# Patient Record
Sex: FEMALE | Race: BLACK OR AFRICAN AMERICAN | NOT HISPANIC OR LATINO | Employment: UNEMPLOYED | URBAN - METROPOLITAN AREA
[De-identification: names, ages, dates, MRNs, and addresses within clinical notes are randomized per-mention and may not be internally consistent; named-entity substitution may affect disease eponyms.]

---

## 2019-07-16 LAB
LEFT EYE DM RETINOPATHY: NEGATIVE
RIGHT EYE DM RETINOPATHY: NEGATIVE

## 2019-07-22 ENCOUNTER — HISTORICAL (OUTPATIENT)
Dept: INTERNAL MEDICINE | Facility: CLINIC | Age: 49
End: 2019-07-22

## 2019-07-22 LAB
ABS NEUT (OLG): 1.67 X10(3)/MCL (ref 2.1–9.2)
ALBUMIN SERPL-MCNC: 3.8 GM/DL (ref 3.4–5)
ALBUMIN/GLOB SERPL: 0.9 RATIO (ref 1.1–2)
ALP SERPL-CCNC: 74 UNIT/L (ref 45–117)
ALT SERPL-CCNC: 51 UNIT/L (ref 12–78)
AST SERPL-CCNC: 32 UNIT/L (ref 15–37)
BASOPHILS # BLD AUTO: 0.03 X10(3)/MCL
BASOPHILS NFR BLD AUTO: 1 %
BILIRUB SERPL-MCNC: 0.3 MG/DL (ref 0.2–1)
BILIRUBIN DIRECT+TOT PNL SERPL-MCNC: 0.1 MG/DL
BILIRUBIN DIRECT+TOT PNL SERPL-MCNC: 0.2 MG/DL
BUN SERPL-MCNC: 11 MG/DL (ref 7–18)
CALCIUM SERPL-MCNC: 9.4 MG/DL (ref 8.5–10.1)
CHLORIDE SERPL-SCNC: 105 MMOL/L (ref 98–107)
CHOLEST SERPL-MCNC: 244 MG/DL
CHOLEST/HDLC SERPL: 4.6 {RATIO} (ref 0–4.4)
CO2 SERPL-SCNC: 29 MMOL/L (ref 21–32)
CREAT SERPL-MCNC: 1 MG/DL (ref 0.6–1.3)
CREAT UR-MCNC: 92 MG/DL
EOSINOPHIL # BLD AUTO: 0.15 X10(3)/MCL
EOSINOPHIL NFR BLD AUTO: 4 %
ERYTHROCYTE [DISTWIDTH] IN BLOOD BY AUTOMATED COUNT: 13.3 % (ref 11.5–14.5)
EST. AVERAGE GLUCOSE BLD GHB EST-MCNC: 186 MG/DL
GLOBULIN SER-MCNC: 4.1 GM/ML (ref 2.3–3.5)
GLUCOSE SERPL-MCNC: 142 MG/DL (ref 74–106)
HBA1C MFR BLD: 8.1 % (ref 4.2–6.3)
HCT VFR BLD AUTO: 39 % (ref 35–46)
HDLC SERPL-MCNC: 53 MG/DL
HGB BLD-MCNC: 12.4 GM/DL (ref 12–16)
LDLC SERPL CALC-MCNC: 161 MG/DL (ref 0–130)
LYMPHOCYTES # BLD AUTO: 1.45 X10(3)/MCL
LYMPHOCYTES NFR BLD AUTO: 41 % (ref 13–40)
MCH RBC QN AUTO: 27.7 PG (ref 26–34)
MCHC RBC AUTO-ENTMCNC: 31.8 GM/DL (ref 31–37)
MCV RBC AUTO: 87.1 FL (ref 80–100)
MICROALBUMIN UR-MCNC: 101 MG/L (ref 0–19)
MICROALBUMIN/CREAT RATIO PNL UR: 109.8 MCG/MG CR (ref 0–29)
MONOCYTES # BLD AUTO: 0.27 X10(3)/MCL
MONOCYTES NFR BLD AUTO: 8 % (ref 0–24)
NEUTROPHILS # BLD AUTO: 1.67 X10(3)/MCL
NEUTROPHILS NFR BLD AUTO: 47 X10(3)/MCL
PLATELET # BLD AUTO: 216 X10(3)/MCL (ref 130–400)
PMV BLD AUTO: 10.1 FL (ref 7.4–10.4)
POTASSIUM SERPL-SCNC: 4.2 MMOL/L (ref 3.5–5.1)
PROT SERPL-MCNC: 7.9 GM/DL (ref 6.4–8.2)
RBC # BLD AUTO: 4.48 X10(6)/MCL (ref 4–5.2)
SODIUM SERPL-SCNC: 138 MMOL/L (ref 136–145)
T3FREE SERPL-MCNC: 1.95 PG/ML (ref 2.18–3.98)
T4 FREE SERPL-MCNC: 0.96 NG/DL (ref 0.76–1.46)
TRIGL SERPL-MCNC: 150 MG/DL
TSH SERPL-ACNC: 6.74 MIU/L (ref 0.36–3.74)
VLDLC SERPL CALC-MCNC: 30 MG/DL
WBC # SPEC AUTO: 3.6 X10(3)/MCL (ref 4.5–11)

## 2019-07-29 ENCOUNTER — HISTORICAL (OUTPATIENT)
Dept: RADIOLOGY | Facility: HOSPITAL | Age: 49
End: 2019-07-29

## 2019-08-06 ENCOUNTER — HISTORICAL (OUTPATIENT)
Dept: INTERNAL MEDICINE | Facility: CLINIC | Age: 49
End: 2019-08-06

## 2019-08-10 ENCOUNTER — HOSPITAL ENCOUNTER (OUTPATIENT)
Dept: MEDSURG UNIT | Facility: HOSPITAL | Age: 49
End: 2019-08-12
Attending: INTERNAL MEDICINE | Admitting: INTERNAL MEDICINE

## 2019-08-10 ENCOUNTER — HOSPITAL ENCOUNTER (OUTPATIENT)
Dept: TELEMEDICINE | Facility: HOSPITAL | Age: 49
Discharge: HOME | End: 2019-08-10

## 2019-08-10 DIAGNOSIS — R29.90 STROKE-LIKE SYMPTOMS: ICD-10-CM

## 2019-08-10 LAB
ABS NEUT (OLG): 2.11 X10(3)/MCL (ref 2.1–9.2)
ALBUMIN SERPL-MCNC: 3.8 GM/DL (ref 3.4–5)
ALBUMIN/GLOB SERPL: 1 RATIO (ref 1.1–2)
ALP SERPL-CCNC: 76 UNIT/L (ref 45–117)
ALT SERPL-CCNC: 33 UNIT/L (ref 12–78)
AMPHET UR QL SCN: NEGATIVE
APPEARANCE, UA: CLEAR
APTT PPP: 29 SECOND(S) (ref 23.3–37)
AST SERPL-CCNC: 25 UNIT/L (ref 15–37)
BACTERIA #/AREA URNS AUTO: ABNORMAL /[HPF]
BARBITURATE SCN PRESENT UR: NEGATIVE
BASOPHILS # BLD AUTO: 0.03 X10(3)/MCL
BASOPHILS NFR BLD AUTO: 1 %
BENZODIAZ UR QL SCN: NEGATIVE
BILIRUB SERPL-MCNC: 0.3 MG/DL (ref 0.2–1)
BILIRUB UR QL STRIP: NEGATIVE
BILIRUBIN DIRECT+TOT PNL SERPL-MCNC: 0.1 MG/DL
BILIRUBIN DIRECT+TOT PNL SERPL-MCNC: 0.2 MG/DL
BUN SERPL-MCNC: 13 MG/DL (ref 7–18)
CALCIUM SERPL-MCNC: 9.2 MG/DL (ref 8.5–10.1)
CANNABINOIDS UR QL SCN: NEGATIVE
CHLORIDE SERPL-SCNC: 103 MMOL/L (ref 98–107)
CK MB SERPL-MCNC: <1 NG/ML (ref 1–3.6)
CK SERPL-CCNC: 118 UNIT/L (ref 26–192)
CO2 SERPL-SCNC: 28 MMOL/L (ref 21–32)
COCAINE UR QL SCN: NEGATIVE
COLOR UR: COLORLESS
CREAT SERPL-MCNC: 1.1 MG/DL (ref 0.6–1.3)
EOSINOPHIL # BLD AUTO: 0.21 X10(3)/MCL
EOSINOPHIL NFR BLD AUTO: 5 %
ERYTHROCYTE [DISTWIDTH] IN BLOOD BY AUTOMATED COUNT: 13.5 % (ref 11.5–14.5)
ETHANOL SERPL-MCNC: <3 MG/DL
GLOBULIN SER-MCNC: 4 GM/ML (ref 2.3–3.5)
GLUCOSE (UA): 30 MG/DL
GLUCOSE SERPL-MCNC: 231 MG/DL (ref 74–106)
HCT VFR BLD AUTO: 38.6 % (ref 35–46)
HGB BLD-MCNC: 12.2 GM/DL (ref 12–16)
HGB UR QL STRIP: NEGATIVE
HYALINE CASTS #/AREA URNS LPF: ABNORMAL /[LPF]
IMM GRANULOCYTES # BLD AUTO: 0.01 10*3/UL
IMM GRANULOCYTES NFR BLD AUTO: 0 %
INR PPP: 0.95 (ref 0.9–1.2)
KETONES UR QL STRIP: NEGATIVE
LEUKOCYTE ESTERASE UR QL STRIP: NEGATIVE
LYMPHOCYTES # BLD AUTO: 1.68 X10(3)/MCL
LYMPHOCYTES NFR BLD AUTO: 38 % (ref 13–40)
MAGNESIUM SERPL-MCNC: 2.1 MG/DL (ref 1.6–2.6)
MCH RBC QN AUTO: 27.1 PG (ref 26–34)
MCHC RBC AUTO-ENTMCNC: 31.6 GM/DL (ref 31–37)
MCV RBC AUTO: 85.8 FL (ref 80–100)
MONOCYTES # BLD AUTO: 0.37 X10(3)/MCL
MONOCYTES NFR BLD AUTO: 8 % (ref 0–24)
NEUTROPHILS # BLD AUTO: 2.11 X10(3)/MCL
NEUTROPHILS NFR BLD AUTO: 48 X10(3)/MCL
NITRITE UR QL STRIP: NEGATIVE
OPIATES UR QL SCN: NEGATIVE
PCP UR QL: NEGATIVE
PH UR STRIP.AUTO: 5 [PH] (ref 5–8)
PH UR STRIP: 5 [PH] (ref 4.5–8)
PLATELET # BLD AUTO: 212 X10(3)/MCL (ref 130–400)
PMV BLD AUTO: 10.8 FL (ref 7.4–10.4)
POC TROPONIN: 0 NG/ML (ref 0–0.08)
POTASSIUM SERPL-SCNC: 4.1 MMOL/L (ref 3.5–5.1)
PROT SERPL-MCNC: 7.8 GM/DL (ref 6.4–8.2)
PROT UR QL STRIP: NEGATIVE
PROTHROMBIN TIME: 12.6 SECOND(S) (ref 11.9–14.4)
RBC # BLD AUTO: 4.5 X10(6)/MCL (ref 4–5.2)
RBC #/AREA URNS AUTO: ABNORMAL /[HPF]
SODIUM SERPL-SCNC: 138 MMOL/L (ref 136–145)
SP GR UR STRIP: 1.01 (ref 1–1.03)
SQUAMOUS #/AREA URNS LPF: ABNORMAL /[LPF]
TEMPERATURE, URINE (OHS): 25 DEGC (ref 20–25)
TROPONIN I SERPL-MCNC: <0.015 NG/ML (ref 0–0.05)
UROBILINOGEN UR STRIP-ACNC: NORMAL
WBC # SPEC AUTO: 4.4 X10(3)/MCL (ref 4.5–11)
WBC #/AREA URNS AUTO: ABNORMAL /HPF

## 2019-08-10 PROCEDURE — G0427 INPT/ED TELECONSULT70: HCPCS | Mod: GT,,, | Performed by: PSYCHIATRY & NEUROLOGY

## 2019-08-10 PROCEDURE — G0427 PR INPT TELEHEALTH CON 70/>M: ICD-10-PCS | Mod: GT,,, | Performed by: PSYCHIATRY & NEUROLOGY

## 2019-08-11 LAB
ABS NEUT (OLG): 1.4 X10(3)/MCL (ref 2.1–9.2)
ALBUMIN SERPL-MCNC: 3.2 GM/DL (ref 3.4–5)
ALBUMIN/GLOB SERPL: 0.9 RATIO (ref 1.1–2)
ALP SERPL-CCNC: 65 UNIT/L (ref 45–117)
ALT SERPL-CCNC: 27 UNIT/L (ref 12–78)
AST SERPL-CCNC: 20 UNIT/L (ref 15–37)
BASOPHILS # BLD AUTO: 0.02 X10(3)/MCL
BASOPHILS NFR BLD AUTO: 1 %
BILIRUB SERPL-MCNC: 0.3 MG/DL (ref 0.2–1)
BILIRUBIN DIRECT+TOT PNL SERPL-MCNC: <0.1 MG/DL
BILIRUBIN DIRECT+TOT PNL SERPL-MCNC: ABNORMAL MG/DL
BUN SERPL-MCNC: 11 MG/DL (ref 7–18)
CALCIUM SERPL-MCNC: 8.9 MG/DL (ref 8.5–10.1)
CHLORIDE SERPL-SCNC: 109 MMOL/L (ref 98–107)
CK SERPL-CCNC: 86 UNIT/L (ref 26–192)
CO2 SERPL-SCNC: 25 MMOL/L (ref 21–32)
CREAT SERPL-MCNC: 0.9 MG/DL (ref 0.6–1.3)
EOSINOPHIL # BLD AUTO: 0.16 10*3/UL
EOSINOPHIL NFR BLD AUTO: 5 %
ERYTHROCYTE [DISTWIDTH] IN BLOOD BY AUTOMATED COUNT: 13.5 % (ref 11.5–14.5)
GLOBULIN SER-MCNC: 3.5 GM/ML (ref 2.3–3.5)
GLUCOSE SERPL-MCNC: 185 MG/DL (ref 74–106)
HCT VFR BLD AUTO: 36.1 % (ref 35–46)
HGB BLD-MCNC: 11.3 GM/DL (ref 12–16)
IMM GRANULOCYTES # BLD AUTO: 0.01 10*3/UL
IMM GRANULOCYTES NFR BLD AUTO: 0 %
LYMPHOCYTES # BLD AUTO: 1.4 X10(3)/MCL
LYMPHOCYTES NFR BLD AUTO: 44 % (ref 13–40)
MAGNESIUM SERPL-MCNC: 2.2 MG/DL (ref 1.6–2.6)
MCH RBC QN AUTO: 26.8 PG (ref 26–34)
MCHC RBC AUTO-ENTMCNC: 31.3 GM/DL (ref 31–37)
MCV RBC AUTO: 85.7 FL (ref 80–100)
MONOCYTES # BLD AUTO: 0.24 X10(3)/MCL
MONOCYTES NFR BLD AUTO: 8 % (ref 0–24)
NEUTROPHILS # BLD AUTO: 1.4 X10(3)/MCL
NEUTROPHILS NFR BLD AUTO: 43 X10(3)/MCL
PHOSPHATE SERPL-MCNC: 3.5 MG/DL (ref 2.5–4.9)
PLATELET # BLD AUTO: 181 X10(3)/MCL (ref 130–400)
PMV BLD AUTO: 11 FL (ref 7.4–10.4)
POTASSIUM SERPL-SCNC: 4.1 MMOL/L (ref 3.5–5.1)
PROT SERPL-MCNC: 6.7 GM/DL (ref 6.4–8.2)
RBC # BLD AUTO: 4.21 X10(6)/MCL (ref 4–5.2)
SODIUM SERPL-SCNC: 141 MMOL/L (ref 136–145)
WBC # SPEC AUTO: 3.2 X10(3)/MCL (ref 4.5–11)

## 2019-08-11 NOTE — CONSULTS
[facility]  Vascular Neurology  Comprehensive Stroke Center  Tele-Consultation Note      Consults    Consulting Provider: NA CONN  Current Providers  No providers found    Patient Location: [facility] - [facility] Emergency Department  Spoke hospital nurse at bedside with patient assisting consultant.     Patient information was obtained from patient.         Assessment/Plan:     STROKE DOCUMENTATION     Acute Stroke Times:   Acute Stroke Times   Last Known Normal Date: 08/10/19  Last Known Normal Time: 1800  Stroke Team Arrival Date: 08/10/19  Stroke Team Arrival Time: 2141  CT Interpretation Time: 2142    NIH Scale:  Interval: baseline  1a. Level of Consciousness: 0-->Alert, keenly responsive  1b. LOC Questions: 0-->Answers both questions correctly  1c. LOC Commands: 0-->Performs both tasks correctly  2. Best Gaze: 0-->Normal  3. Visual: 0-->No visual loss  4. Facial Palsy: 0-->Normal symmetrical movements  5a. Motor Arm, Left: 0-->No drift, limb holds 90 (or 45) degrees for full 10 secs  5b. Motor Arm, Right: 0-->No drift, limb holds 90 (or 45) degrees for full 10 secs  6a. Motor Leg, Left: 0-->No drift, leg holds 30 degree position for full 5 secs  6b. Motor Leg, Right: 0-->No drift, leg holds 30 degree position for full 5 secs  7. Limb Ataxia: 0-->Absent  8. Sensory: 1-->Mild-to-moderate sensory loss, patient feels pinprick is less sharp or is dull on the affected side, or there is a loss of superficial pain with pinprick, but patient is aware of being touched  9. Best Language: 0-->No aphasia, normal  10. Dysarthria: 0-->Normal  11. Extinction and Inattention (formerly Neglect): 0-->No abnormality  Total (NIH Stroke Scale): 1     Modified Pratt Score: 0  [place] Coma Scale:15   ABCD2 Score:    [id number] Score:   HAS -BLED Score:   ICH Score:   Hunt & Little Classification:       Diagnoses:   Stroke-like  symptoms  Cardiovascular risk factor.  Stroke like symptoms resolved        There were no vitals taken for this visit.  Alteplase Eligible?: No  Alteplase Recommendation: Alteplase not recommended due to Suspected stroke mimic   Possible Interventional Revascularization Candidate? No; No significant neurological deficit    Disposition Recommendation: admit to inpatient    Subjective:     History of Present Illness:  Acute onset of confusion, staring, word finding difficulties. Improved but now complaining of a headache.   No history of weakness, numbness.     PMHx  HTN  DM  Hyperlipidemia  History of absence seizure. Last seizure 2012. Onset was adulthood. Off keppra since 2012      Woke up with symptoms?: no    Recent bleeding noted: no  Does the patient take any Blood Thinners? no  Medications: No relevant medications      Past Medical History: hypertension and diabetes    Past Surgical History: none    Family History: no relevant history    Social History: no smoking, no drinking, no drugs    Allergies: Allergies have not been reviewed No known drug allergies  Review of Symptoms:   Constitutional: Denies fevers, weight loss, chills, or weakness.   Eyes: Denies changes in vision.   ENT: Denies dysphagia, nasal discharge, ear pain or discharge.   Cardiovascular: Denies chest pain, palpitations, orthopnea, or claudication.   Respiratory: Denies shortness of breath, cough, hemoptysis, or wheezing.   GI: Denies nausea/vomitting, hematochezia, melena, abd pain, or changes in appetite.   : Denies dysuria, incontinence, or hematuria.   Musculoskeletal: Denies joint pain or myalgias.   Skin/breast: Denies rashes, lumps, lesions, or discharge.   Neurologic: Denies headache, dizziness, vertigo, or paresthesias.   Psychiatric: Denies changes in mood or hallucinations.   Endocrine: Denies polyuria, polydipsia, heat/cold intolerance.   Hematologic/Lymph: Denies lymphadenopathy, easy bruising or easy bleeding.    Allergic/Immunologic: Denies rash, rhinitis    Review of Systems  Objective:   Vitals: There were no vitals taken for this visit. BP: 160/99, Respiratory Rate: 16 and Heart Rate: 77    CT READ: Yes  No hemmorhage. No mass effect. No early infarct signs.     Physical Exam   Constitutional: She is oriented to person, place, and time. She appears well-developed and well-nourished.   HENT:   Head: Normocephalic and atraumatic.   Eyes: EOM are normal.   Pulmonary/Chest: Effort normal.   Neurological: She is alert and oriented to person, place, and time.   Vitals reviewed.            Recommended the emergency room physician to have a brief discussion with the patient and/or family if available regarding the risks and benefits of treatment, and to briefly document the occurrence of that discussion in his clinical encounter note.     The attending portion of this evaluation, treatment, and documentation was performed per Meet Larkin MD via audiovisual.    Billing code:  ([place] [facility] INPT CONSULT 35MIN)    In your opinion, this was a: Tier 2 Van Negative    Consult End Time: 9:49 PM     Meet Larkin MD  Comprehensive Stroke Center  Vascular Neurology   [facility]

## 2019-08-11 NOTE — HPI
Acute onset of confusion, staring, word finding difficulties. Improved but now complaining of a headache.   No history of weakness, numbness.     PMHx  HTN  DM  Hyperlipidemia  History of absence seizure. Last seizure 2012. Onset was adulthood. Off keppra since 2012

## 2019-08-11 NOTE — SUBJECTIVE & OBJECTIVE
Woke up with symptoms?: no    Recent bleeding noted: no  Does the patient take any Blood Thinners? no  Medications: No relevant medications      Past Medical History: hypertension and diabetes    Past Surgical History: none    Family History: no relevant history    Social History: no smoking, no drinking, no drugs    Allergies: Allergies have not been reviewed No known drug allergies  Review of Symptoms:   Constitutional: Denies fevers, weight loss, chills, or weakness.   Eyes: Denies changes in vision.   ENT: Denies dysphagia, nasal discharge, ear pain or discharge.   Cardiovascular: Denies chest pain, palpitations, orthopnea, or claudication.   Respiratory: Denies shortness of breath, cough, hemoptysis, or wheezing.   GI: Denies nausea/vomitting, hematochezia, melena, abd pain, or changes in appetite.   : Denies dysuria, incontinence, or hematuria.   Musculoskeletal: Denies joint pain or myalgias.   Skin/breast: Denies rashes, lumps, lesions, or discharge.   Neurologic: Denies headache, dizziness, vertigo, or paresthesias.   Psychiatric: Denies changes in mood or hallucinations.   Endocrine: Denies polyuria, polydipsia, heat/cold intolerance.   Hematologic/Lymph: Denies lymphadenopathy, easy bruising or easy bleeding.   Allergic/Immunologic: Denies rash, rhinitis    Review of Systems  Objective:   Vitals: There were no vitals taken for this visit. BP: 160/99, Respiratory Rate: 16 and Heart Rate: 77    CT READ: Yes  No hemmorhage. No mass effect. No early infarct signs.     Physical Exam   Constitutional: She is oriented to person, place, and time. She appears well-developed and well-nourished.   HENT:   Head: Normocephalic and atraumatic.   Eyes: EOM are normal.   Pulmonary/Chest: Effort normal.   Neurological: She is alert and oriented to person, place, and time.   Vitals reviewed.

## 2019-08-12 LAB
ABS NEUT (OLG): 1.64 X10(3)/MCL (ref 2.1–9.2)
ALBUMIN SERPL-MCNC: 3.2 GM/DL (ref 3.4–5)
ALBUMIN/GLOB SERPL: 0.9 RATIO (ref 1.1–2)
ALP SERPL-CCNC: 67 UNIT/L (ref 45–117)
ALT SERPL-CCNC: 32 UNIT/L (ref 12–78)
AST SERPL-CCNC: 28 UNIT/L (ref 15–37)
BASOPHILS # BLD AUTO: 0.03 X10(3)/MCL
BASOPHILS NFR BLD AUTO: 1 %
BILIRUB SERPL-MCNC: 0.4 MG/DL (ref 0.2–1)
BILIRUBIN DIRECT+TOT PNL SERPL-MCNC: <0.1 MG/DL
BILIRUBIN DIRECT+TOT PNL SERPL-MCNC: ABNORMAL MG/DL
BUN SERPL-MCNC: 10 MG/DL (ref 7–18)
CALCIUM SERPL-MCNC: 9 MG/DL (ref 8.5–10.1)
CHLORIDE SERPL-SCNC: 108 MMOL/L (ref 98–107)
CO2 SERPL-SCNC: 29 MMOL/L (ref 21–32)
CREAT SERPL-MCNC: 1 MG/DL (ref 0.6–1.3)
EOSINOPHIL # BLD AUTO: 0.15 X10(3)/MCL
EOSINOPHIL NFR BLD AUTO: 4 %
ERYTHROCYTE [DISTWIDTH] IN BLOOD BY AUTOMATED COUNT: 13.6 % (ref 11.5–14.5)
GLOBULIN SER-MCNC: 3.6 GM/ML (ref 2.3–3.5)
GLUCOSE SERPL-MCNC: 127 MG/DL (ref 74–106)
HCT VFR BLD AUTO: 36.9 % (ref 35–46)
HGB BLD-MCNC: 11.7 GM/DL (ref 12–16)
IMM GRANULOCYTES # BLD AUTO: 0.01 10*3/UL
IMM GRANULOCYTES NFR BLD AUTO: 0 %
LYMPHOCYTES # BLD AUTO: 1.34 X10(3)/MCL
LYMPHOCYTES NFR BLD AUTO: 39 % (ref 13–40)
MAGNESIUM SERPL-MCNC: 2.1 MG/DL (ref 1.6–2.6)
MCH RBC QN AUTO: 27.2 PG (ref 26–34)
MCHC RBC AUTO-ENTMCNC: 31.7 GM/DL (ref 31–37)
MCV RBC AUTO: 85.8 FL (ref 80–100)
MONOCYTES # BLD AUTO: 0.26 X10(3)/MCL
MONOCYTES NFR BLD AUTO: 8 % (ref 0–24)
NEUTROPHILS # BLD AUTO: 1.64 X10(3)/MCL
NEUTROPHILS NFR BLD AUTO: 48 X10(3)/MCL
PHOSPHATE SERPL-MCNC: 3.7 MG/DL (ref 2.5–4.9)
PLATELET # BLD AUTO: 182 X10(3)/MCL (ref 130–400)
PMV BLD AUTO: 10.9 FL (ref 7.4–10.4)
POTASSIUM SERPL-SCNC: 3.7 MMOL/L (ref 3.5–5.1)
PROT SERPL-MCNC: 6.8 GM/DL (ref 6.4–8.2)
RBC # BLD AUTO: 4.3 X10(6)/MCL (ref 4–5.2)
SODIUM SERPL-SCNC: 144 MMOL/L (ref 136–145)
T3FREE SERPL-MCNC: 1.8 PG/ML (ref 2.18–3.98)
T4 FREE SERPL-MCNC: 1.11 NG/DL (ref 0.76–1.46)
TSH SERPL-ACNC: 2.73 MIU/L (ref 0.36–3.74)
WBC # SPEC AUTO: 3.4 X10(3)/MCL (ref 4.5–11)

## 2020-03-03 ENCOUNTER — HISTORICAL (OUTPATIENT)
Dept: INTERNAL MEDICINE | Facility: CLINIC | Age: 50
End: 2020-03-03

## 2020-03-03 LAB
ABS NEUT (OLG): 1.33 X10(3)/MCL (ref 2.1–9.2)
ALBUMIN SERPL-MCNC: 4 GM/DL (ref 3.4–5)
ALBUMIN/GLOB SERPL: 1 RATIO (ref 1.1–2)
ALP SERPL-CCNC: 73 UNIT/L (ref 45–117)
ALT SERPL-CCNC: 40 UNIT/L (ref 12–78)
AST SERPL-CCNC: 28 UNIT/L (ref 15–37)
BASOPHILS # BLD AUTO: 0 X10(3)/MCL (ref 0–0.2)
BASOPHILS NFR BLD AUTO: 1 %
BILIRUB SERPL-MCNC: 0.3 MG/DL (ref 0.2–1)
BILIRUBIN DIRECT+TOT PNL SERPL-MCNC: <0.1 MG/DL (ref 0–0.2)
BILIRUBIN DIRECT+TOT PNL SERPL-MCNC: >0.2 MG/DL
BUN SERPL-MCNC: 20 MG/DL (ref 7–18)
CALCIUM SERPL-MCNC: 9.8 MG/DL (ref 8.5–10.1)
CHLORIDE SERPL-SCNC: 106 MMOL/L (ref 98–107)
CHOLEST SERPL-MCNC: 243 MG/DL
CHOLEST/HDLC SERPL: 4.3 {RATIO} (ref 0–4.4)
CO2 SERPL-SCNC: 27 MMOL/L (ref 21–32)
CREAT SERPL-MCNC: 1.1 MG/DL (ref 0.6–1.3)
EOSINOPHIL # BLD AUTO: 0.1 X10(3)/MCL (ref 0–0.9)
EOSINOPHIL NFR BLD AUTO: 4 %
ERYTHROCYTE [DISTWIDTH] IN BLOOD BY AUTOMATED COUNT: 13.4 % (ref 11.5–14.5)
EST. AVERAGE GLUCOSE BLD GHB EST-MCNC: 214 MG/DL
GLOBULIN SER-MCNC: 4 GM/ML (ref 2.3–3.5)
GLUCOSE SERPL-MCNC: 94 MG/DL (ref 74–106)
HBA1C MFR BLD: 9.1 % (ref 4.2–6.3)
HCT VFR BLD AUTO: 41.7 % (ref 35–46)
HDLC SERPL-MCNC: 56 MG/DL (ref 40–59)
HGB BLD-MCNC: 13.6 GM/DL (ref 12–16)
IMM GRANULOCYTES # BLD AUTO: 0.01 10*3/UL
IMM GRANULOCYTES NFR BLD AUTO: 0 %
LDLC SERPL CALC-MCNC: 159 MG/DL
LYMPHOCYTES # BLD AUTO: 1.5 X10(3)/MCL (ref 0.6–4.6)
LYMPHOCYTES NFR BLD AUTO: 46 %
MCH RBC QN AUTO: 28.4 PG (ref 26–34)
MCHC RBC AUTO-ENTMCNC: 32.6 GM/DL (ref 31–37)
MCV RBC AUTO: 87.1 FL (ref 80–100)
MONOCYTES # BLD AUTO: 0.2 X10(3)/MCL (ref 0.1–1.3)
MONOCYTES NFR BLD AUTO: 7 %
NEUTROPHILS # BLD AUTO: 1.33 X10(3)/MCL (ref 2.1–9.2)
NEUTROPHILS NFR BLD AUTO: 42 %
PLATELET # BLD AUTO: 213 X10(3)/MCL (ref 130–400)
PMV BLD AUTO: 10.3 FL (ref 7.4–10.4)
POTASSIUM SERPL-SCNC: 4.3 MMOL/L (ref 3.5–5.1)
PROT SERPL-MCNC: 8 GM/DL (ref 6.4–8.2)
RBC # BLD AUTO: 4.79 X10(6)/MCL (ref 4–5.2)
SODIUM SERPL-SCNC: 136 MMOL/L (ref 136–145)
T3FREE SERPL-MCNC: 1.96 PG/ML (ref 2.18–3.98)
T4 FREE SERPL-MCNC: 1.01 NG/DL (ref 0.76–1.46)
TRIGL SERPL-MCNC: 139 MG/DL
TSH SERPL-ACNC: 5.87 MIU/L (ref 0.36–3.74)
VLDLC SERPL CALC-MCNC: 28 MG/DL
WBC # SPEC AUTO: 3.2 X10(3)/MCL (ref 4.5–11)

## 2020-08-14 ENCOUNTER — HISTORICAL (OUTPATIENT)
Dept: INTERNAL MEDICINE | Facility: CLINIC | Age: 50
End: 2020-08-14

## 2020-08-14 LAB
ABS NEUT (OLG): 2.31 X10(3)/MCL (ref 2.1–9.2)
ALBUMIN SERPL-MCNC: 3.4 GM/DL (ref 3.4–5)
ALBUMIN/GLOB SERPL: 0.9 RATIO (ref 1.1–2)
ALP SERPL-CCNC: 62 UNIT/L (ref 45–117)
ALT SERPL-CCNC: 26 UNIT/L (ref 12–78)
AST SERPL-CCNC: 21 UNIT/L (ref 15–37)
BASOPHILS # BLD AUTO: 0 X10(3)/MCL (ref 0–0.2)
BASOPHILS NFR BLD AUTO: 0 %
BILIRUB SERPL-MCNC: 0.2 MG/DL (ref 0.2–1)
BILIRUBIN DIRECT+TOT PNL SERPL-MCNC: <0.1 MG/DL (ref 0–0.2)
BILIRUBIN DIRECT+TOT PNL SERPL-MCNC: >0.1 MG/DL
BUN SERPL-MCNC: 18 MG/DL (ref 7–18)
CALCIUM SERPL-MCNC: 9.1 MG/DL (ref 8.5–10.1)
CHLORIDE SERPL-SCNC: 107 MMOL/L (ref 98–107)
CHOLEST SERPL-MCNC: 229 MG/DL
CHOLEST/HDLC SERPL: 4.4 {RATIO} (ref 0–4.4)
CO2 SERPL-SCNC: 26 MMOL/L (ref 21–32)
CREAT SERPL-MCNC: 1.1 MG/DL (ref 0.6–1.3)
EOSINOPHIL # BLD AUTO: 0.2 X10(3)/MCL (ref 0–0.9)
EOSINOPHIL NFR BLD AUTO: 5 %
ERYTHROCYTE [DISTWIDTH] IN BLOOD BY AUTOMATED COUNT: 13.7 % (ref 11.5–14.5)
EST. AVERAGE GLUCOSE BLD GHB EST-MCNC: 186 MG/DL
GLOBULIN SER-MCNC: 3.8 GM/ML (ref 2.3–3.5)
GLUCOSE SERPL-MCNC: 71 MG/DL (ref 74–106)
HBA1C MFR BLD: 8.1 % (ref 4.2–6.3)
HCT VFR BLD AUTO: 38.7 % (ref 35–46)
HDLC SERPL-MCNC: 52 MG/DL (ref 40–59)
HGB BLD-MCNC: 12.4 GM/DL (ref 12–16)
IMM GRANULOCYTES # BLD AUTO: 0.01 10*3/UL
IMM GRANULOCYTES NFR BLD AUTO: 0 %
LDLC SERPL CALC-MCNC: 153 MG/DL
LYMPHOCYTES # BLD AUTO: 1.5 X10(3)/MCL (ref 0.6–4.6)
LYMPHOCYTES NFR BLD AUTO: 34 %
MCH RBC QN AUTO: 28.6 PG (ref 26–34)
MCHC RBC AUTO-ENTMCNC: 32 GM/DL (ref 31–37)
MCV RBC AUTO: 89.2 FL (ref 80–100)
MONOCYTES # BLD AUTO: 0.3 X10(3)/MCL (ref 0.1–1.3)
MONOCYTES NFR BLD AUTO: 8 %
NEUTROPHILS # BLD AUTO: 2.31 X10(3)/MCL (ref 2.1–9.2)
NEUTROPHILS NFR BLD AUTO: 52 %
PLATELET # BLD AUTO: 191 X10(3)/MCL (ref 130–400)
PMV BLD AUTO: 10.5 FL (ref 7.4–10.4)
POTASSIUM SERPL-SCNC: 3.9 MMOL/L (ref 3.5–5.1)
PROT SERPL-MCNC: 7.2 GM/DL (ref 6.4–8.2)
RBC # BLD AUTO: 4.34 X10(6)/MCL (ref 4–5.2)
SODIUM SERPL-SCNC: 139 MMOL/L (ref 136–145)
T4 FREE SERPL-MCNC: 0.88 NG/DL (ref 0.76–1.46)
TRIGL SERPL-MCNC: 121 MG/DL
TSH SERPL-ACNC: 12.99 MIU/L (ref 0.36–3.74)
VLDLC SERPL CALC-MCNC: 24 MG/DL
WBC # SPEC AUTO: 4.4 X10(3)/MCL (ref 4.5–11)

## 2021-02-18 ENCOUNTER — HISTORICAL (OUTPATIENT)
Dept: ADMINISTRATIVE | Facility: HOSPITAL | Age: 51
End: 2021-02-18

## 2021-02-18 LAB
ABS NEUT (OLG): 2.1 X10(3)/MCL (ref 2.1–9.2)
ALBUMIN SERPL-MCNC: 3.6 GM/DL (ref 3.5–5)
ALBUMIN/GLOB SERPL: 1 RATIO (ref 1.1–2)
ALP SERPL-CCNC: 76 UNIT/L (ref 40–150)
ALT SERPL-CCNC: 32 UNIT/L (ref 0–55)
AST SERPL-CCNC: 25 UNIT/L (ref 5–34)
BASOPHILS # BLD AUTO: 0 X10(3)/MCL (ref 0–0.2)
BASOPHILS NFR BLD AUTO: 0 %
BILIRUB SERPL-MCNC: 0.3 MG/DL
BILIRUBIN DIRECT+TOT PNL SERPL-MCNC: 0.1 MG/DL (ref 0–0.5)
BILIRUBIN DIRECT+TOT PNL SERPL-MCNC: 0.2 MG/DL (ref 0–0.8)
BUN SERPL-MCNC: 11 MG/DL (ref 9.8–20.1)
CALCIUM SERPL-MCNC: 9.6 MG/DL (ref 8.4–10.2)
CHLORIDE SERPL-SCNC: 102 MMOL/L (ref 98–107)
CHOLEST SERPL-MCNC: 222 MG/DL
CHOLEST/HDLC SERPL: 5 {RATIO} (ref 0–5)
CO2 SERPL-SCNC: 26 MMOL/L (ref 22–29)
CREAT SERPL-MCNC: 0.97 MG/DL (ref 0.55–1.02)
EOSINOPHIL # BLD AUTO: 0.2 X10(3)/MCL (ref 0–0.9)
EOSINOPHIL NFR BLD AUTO: 6 %
ERYTHROCYTE [DISTWIDTH] IN BLOOD BY AUTOMATED COUNT: 12.6 % (ref 11.5–14.5)
EST. AVERAGE GLUCOSE BLD GHB EST-MCNC: 191.5 MG/DL
GLOBULIN SER-MCNC: 3.7 GM/DL (ref 2.4–3.5)
GLUCOSE SERPL-MCNC: 236 MG/DL (ref 74–100)
HBA1C MFR BLD: 8.3 %
HCT VFR BLD AUTO: 38.9 % (ref 35–46)
HDLC SERPL-MCNC: 47 MG/DL (ref 35–60)
HGB BLD-MCNC: 12.9 GM/DL (ref 12–16)
HIGH RISK HPV 16 (PRECISION): NEGATIVE
HIGH RISK HPV 18/45 (PRECISION): NEGATIVE
IMM GRANULOCYTES # BLD AUTO: 0.01 10*3/UL
IMM GRANULOCYTES NFR BLD AUTO: 0 %
LDLC SERPL CALC-MCNC: 143 MG/DL (ref 50–140)
LYMPHOCYTES # BLD AUTO: 1.2 X10(3)/MCL (ref 0.6–4.6)
LYMPHOCYTES NFR BLD AUTO: 31 %
MCH RBC QN AUTO: 30.2 PG (ref 26–34)
MCHC RBC AUTO-ENTMCNC: 33.2 GM/DL (ref 31–37)
MCV RBC AUTO: 91.1 FL (ref 80–100)
MONOCYTES # BLD AUTO: 0.3 X10(3)/MCL (ref 0.1–1.3)
MONOCYTES NFR BLD AUTO: 8 %
NEUTROPHILS # BLD AUTO: 2.1 X10(3)/MCL (ref 2.1–9.2)
NEUTROPHILS NFR BLD AUTO: 55 %
PAP RECOMMENDATION EXT: NORMAL
PAP SMEAR: NORMAL
PLATELET # BLD AUTO: 199 X10(3)/MCL (ref 130–400)
PMV BLD AUTO: 10.8 FL (ref 7.4–10.4)
POTASSIUM SERPL-SCNC: 4 MMOL/L (ref 3.5–5.1)
PROT SERPL-MCNC: 7.3 GM/DL (ref 6.4–8.3)
RBC # BLD AUTO: 4.27 X10(6)/MCL (ref 4–5.2)
SODIUM SERPL-SCNC: 138 MMOL/L (ref 136–145)
T4 FREE SERPL-MCNC: 1.01 NG/DL (ref 0.7–1.48)
TRIGL SERPL-MCNC: 158 MG/DL (ref 37–140)
TSH SERPL-ACNC: 7.02 UIU/ML (ref 0.35–4.94)
VLDLC SERPL CALC-MCNC: 32 MG/DL
WBC # SPEC AUTO: 3.8 X10(3)/MCL (ref 4.5–11)

## 2021-03-19 ENCOUNTER — HISTORICAL (OUTPATIENT)
Dept: RADIOLOGY | Facility: HOSPITAL | Age: 51
End: 2021-03-19

## 2022-03-15 ENCOUNTER — HISTORICAL (OUTPATIENT)
Dept: GASTROENTEROLOGY | Facility: CLINIC | Age: 52
End: 2022-03-15

## 2022-03-15 LAB — SARS-COV-2 AG RESP QL IA.RAPID: NEGATIVE

## 2022-03-18 ENCOUNTER — HISTORICAL (OUTPATIENT)
Dept: ENDOSCOPY | Facility: HOSPITAL | Age: 52
End: 2022-03-18

## 2022-03-18 LAB
CBG: 205 (ref 70–115)
CRC RECOMMENDATION EXT: NORMAL

## 2022-04-10 ENCOUNTER — HISTORICAL (OUTPATIENT)
Dept: ADMINISTRATIVE | Facility: HOSPITAL | Age: 52
End: 2022-04-10

## 2022-04-25 VITALS
SYSTOLIC BLOOD PRESSURE: 134 MMHG | BODY MASS INDEX: 37.7 KG/M2 | OXYGEN SATURATION: 99 % | HEIGHT: 63 IN | WEIGHT: 212.75 LBS | DIASTOLIC BLOOD PRESSURE: 86 MMHG

## 2022-04-29 NOTE — ED PROVIDER NOTES
"   Patient:   Felicita Moctezuma             MRN: [id number]            FIN: [id number]               Age:   48 years     Sex:  Female     :  1970   Associated Diagnoses:   Altered mental status   Author:   Musa Burkett MD      Basic Information   Time seen: Date & time 8/10/2019 19:35:00.   History source: Patient.   Arrival mode: Private vehicle.   History limitation: Clinical condition.   Additional information: Chief Complaint from Nursing Triage Note : Chief Complaint   8/10/2019 19:35 CDT      Chief Complaint           Pt  reports pt was sitting at table at 1800 and got a glazed look over her face  .   Provider/Visit info:   Time Seen:  Musa Burkett MD / 08/10/2019 19:35  .   History of Present Illness   The patient presents with weakness and Generalized.  The onset was 8/10/2019 18:00:00 .  The course/duration of symptoms is constant.  The character of symptoms is weakness and difficult to speak.  The degree at onset was moderate.  The degree at maximum was severe.  The degree at present is severe.  Risk factors consist of diabetes mellitus and hyperlipidemia.  Prior episodes: none.  Therapy today: none.      48-year-old female with a history of hypertension, DMII, HLD, and Hypothyroidism brought to the emergency room by her  after patient had decreased responsiveness.  Symptoms occurred at 6 PM.  They occurred shortly after the patient had eaten a hot dog.  She reported that she did not feel well, and then said "I can hear you."  Patient then asked to be taken to the hospital  Patient has become more lethargic since onset..        Review of Systems             Additional review of systems information: Unable to obtain due to: Clinical condition.      Health Status   Allergies:    Allergic Reactions (Selected)  Severity Not Documented  Fioricet- No reactions were documented.,    Allergies (1) Active Reaction  Fioricet None Documented  .   Medications:  (Selected) "   Prescriptions  Prescribed  amlodipine 5 mg oral tablet: 5 mg = 1 tab(s), Oral, Daily, # 30 tab(s), 11 Refill(s), Pharmacy: [facility] [address]  glimepiride 4 mg oral tablet: 4 mg = 1 tab(s), Oral, BID, with the first meal of the day, # 60 tab(s), 11 Refill(s), Pharmacy: [facility] [address]  levothyroxine 50 mcg (0.05 mg) oral tablet: 50 mcg = 1 tab(s), Oral, Daily, # 30 tab(s), 11 Refill(s), Pharmacy: [facility] [address]  lisinopril 20 mg oral tablet: 20 mg = 1 tab(s), Oral, Daily, # 30 tab(s), 11 Refill(s), Pharmacy: [facility] [address]  metFORMIN 1000 mg oral tablet: 1,000 mg = 1 tab(s), Oral, BID, with meals, # 60 tab(s), 11 Refill(s), Pharmacy: [facility] [address]  nebulizer machine: nebulizer machine, See Instructions, provide one nebulizer machine with supplies, # 1 EA, 0 Refill(s)  rosuvastatin 10 mg oral tablet: 10 mg = 1 tab(s), Oral, Once a day (at bedtime), # 30 tab(s), 11 Refill(s), Pharmacy: [facility] [address].      Past Medical/ Family/ Social History   Medical history:    Resolved  HTN (hypertension) ([id number]):  Resolved.  Headache, migraine ([id number]):  Resolved.  Hx of diabetes mellitus ([id number]):  Resolved., Reviewed as documented in chart.   Surgical history:    Gallbladder ([id number]) on 2008 at 37 Years.  c section x 2.  gallblader.   section ([id number])., Reviewed as documented in chart.   Family history:    No family history items have been selected or recorded., Reviewed as documented in chart.   Social history: Reviewed as documented in chart.   Problem list:    Active Problems (5)  Diabetes   HLD (hyperlipidemia)   Hypothyroid   Knowledge deficit   Obesity   .      Physical Examination               Vital Signs   Vital Signs   8/10/2019 19:35 CDT      Temperature Oral          37.2 DegC                             Temperature Oral (calculated)             98.96 DegF                              Peripheral Pulse Rate     70 bpm                             Respiratory Rate          22 br/min                             SpO2                      99 %                             Oxygen Therapy            Room air                             Systolic Blood Pressure   137 mmHg                             Diastolic Blood Pressure  80 mmHg  .      Vital Signs (last 24 hrs)_____  Last Charted___________  Temp Oral     37.2 DegC  (AUG 10 19:35)  Heart Rate Peripheral   70 bpm  (AUG 10 19:35)  Resp Rate         22 br/min  (AUG 10 19:35)  SBP      137 mmHg  (AUG 10 19:35)  DBP      80 mmHg  (AUG 10 19:35)  SpO2      99 %  (AUG 10 19:35)  Weight      100 kg  (AUG 10 19:35)  .   Measurements   8/10/2019 19:35 CDT      Weight Dosing             100 kg                             Weight Measured           100 kg                             Weight Measured and Calculated in Lbs     220.46 lb  .   Basic Oxygen Information   8/10/2019 19:35 CDT      SpO2                      99 %                             Oxygen Therapy            Room air  .   General:  Lethargic, no distress.   [place] coma scale:  Eye response: 2 /4, verbal response: 2 /5, motor response: 6 /6.    Neurological:  Patient will not talk.  With sternal rub, she moans.  She will obey commands when stimulated.  4/5 equal strength bilateral lower and upper extremities bilaterally.  Equal face symmetry.  Unable to assess pronator drift because patient will no obey this command., Level of consciousness: Stuporous.    Skin:  Intact, moist.    Head:  Normocephalic, atraumatic.    Eye:  Pupils are equal, round and reactive to light.   Ears, nose, mouth and throat:  Tympanic membranes clear, oral mucosa moist, no pharyngeal erythema or exudate.    Cardiovascular:  Regular rate and rhythm, No murmur, Normal peripheral perfusion, No edema.    Respiratory:  Lungs are clear to auscultation, respirations are non-labored, breath sounds are equal, Symmetrical chest  wall expansion.    Gastrointestinal:  Soft, Nontender, Non distended, Normal bowel sounds.       Medical Decision Making   Differential Diagnosis:  Non-hemorrhagic cerebral vascular accident, hemorrhagic cerebral vascular accident, hypoglycemia.    Documents reviewed:  Emergency department nurses' notes.   Electrocardiogram:  Time 8/10/2019 19:35:00, rate 69, normal sinus rhythm, No ST-T changes, no ectopy, normal AR & QRS intervals, EP Interp.    Results review:  Lab results : Lab View   8/10/2019 20:00 CDT      U pH                      5.0                             UA Appear                 Clear                             UA Color                  COLORLESS                             UA Spec Grav              1.010                             UA Bili                   Negative                             UA pH                     5.0                             UA Urobilinogen           Normal                             UA Blood                  Negative                             UA Glucose                30 mg/dL                             UA Ketones                Negative                             UA Protein                Negative                             UA Nitrite                Negative                             UA Leuk Est               Negative                             UA WBC Interp             0-2 /HPF                             UA RBC Interp             0-2                             UA Bact Interp            None Seen                             UA Squam Epi Interp       1-2                             UA Hyal Cast Interp       0-2                             U Temp                    25.0 DegC                             U Amph Scr                Negative                             U Mya Scr                Negative                             U Benzodia Scr            Negative                             U Cannab Scr              Negative                             U Cocaine  Scr             Negative                             U Opiate Scr              Negative                             U Phencyclidine Scr       Negative    8/10/2019 19:50 CDT      POC Troponin              0.00 ng/mL    8/10/2019 19:40 CDT      Sodium Lvl                138 mmol/L                             Potassium Lvl             4.1 mmol/L                             Chloride                  103 mmol/L                             CO2                       28 mmol/L                             Calcium Lvl               9.2 mg/dL                             Magnesium Lvl             2.1 mg/dL                             Glucose Lvl               231 mg/dL  HI                             BUN                       13 mg/dL                             Creatinine                1.10 mg/dL                             eGFR-AA                   68 mL/min  LOW                             eGFR-FELIPE                  56 mL/min  LOW                             Bili Total                0.3 mg/dL                             Bili Direct               0.1 mg/dL                             Bili Indirect             0.2 mg/dL                             AST                       25 unit/L                             ALT                       33 unit/L                             Alk Phos                  76 unit/L                             Total Protein             7.8 gm/dL                             Albumin Lvl               3.8 gm/dL                             Globulin                  4.00 gm/mL  HI                             A/G Ratio                 1.0 ratio  LOW                             Total CK                  118 unit/L                             CK MB                     <1.0 ng/mL                             Troponin-I                <0.015 ng/mL                             PT                        12.6 second(s)                             INR                       0.95                             PTT                        29.0 second(s)                             WBC                       4.4 x10(3)/mcL  LOW                             RBC                       4.50 x10(6)/mcL                             Hgb                       12.2 gm/dL                             Hct                       38.6 %                             Platelet                  212 x10(3)/mcL                             MCV                       85.8 fL                             MCH                       27.1 pg                             MCHC                      31.6 gm/dL                             RDW                       13.5 %                             MPV                       10.8 fL  HI                             Abs Neut                  2.11 x10(3)/mcL                             Neutro Auto               48 x10(3)/mcL  NA                             Lymph Auto                38 %                             Mono Auto                 8 %                             Eos Auto                  5  NA                             Abs Eos                   0.21 x10(3)/mcL  NA                             Basophil Auto             1  NA                             Abs Neutro                2.11 x10(3)/mcL  NA                             Abs Lymph                 1.68 x10(3)/mcL  NA                             Abs Mono                  0.37 x10(3)/mcL  NA                             Abs Baso                  0.03 x10(3)/mcL  NA                             IG%                       0 %  NA                             IG#                       0.0100  NA                             Ethanol Lvl               <3.0 mg/dL  .   Chest X-Ray:  Time reported 8/10/2019 20:30:00, no acute disease process, interpretation by Emergency Physician.    Radiology results:  Rad Results (ST)  < 12 hrs   Accession: [id number]  Order: CT Head W/O Contrast  Report Dt/Tm: 08/10/2019 19:56  Report:   Clinical History:  Altered level of consciousness      Technique:  Axial CT of the brain were obtained without contrast. There are  osseous reconstructed images available for review with coronal and  sagittal reconstructions.     Automatic exposure control was utilized to reduce the patient's  radiation dose.     Comparison:  May 17, 2019     Findings:  No acute intracranial hemorrhage, edema or mass. No acute parenchymal  abnormality.  There is no hydrocephalus, evidence of herniation or midline shift.  The ventricles and sulci are normal.   There is normal gray white differentiation.   The osseous structures are normal.   Fluid within the right mastoid air cells.   The auditory canals are patent bilaterally.  The globes and orbital contents are normal bilaterally.  Mild thickening of the right maxillary sinus.     Impression  No acute intracranial abnormality identified.      .      Reexamination/ Reevaluation   Time: 8/10/2019 20:08:00 .   Notes: Patient is beginning to become more awake.  She is still no speaking.  E3V2M6.  Patient has a symmetric smile.  Patient lifts arms and legs equally.  Will continue to monitor..   Time: 8/10/2019 20:22:00 .   Notes: Patient is rapidly improving, now talking.  She does not remember the events from shortly after eating the hotdog to currently.  She reports a history in 2012 of a similar episode, and begun on keppra.  She is now talking, though feels weak.  She reports only a mild headache - similar to previous headaches.  Patient rapidly improving.  Negative CT head.  Findings appear consistent with possibly an absence seizure or possibly a complex migraine.  Will continue to monitor, and likely place as an observation admission..      Procedure   NIH Stroke Scale   Time: 8/10/2019 20:22:00 .    Level of consciousness: Alert = 0.    Current month and age: Answers both correctly = 0.    Open and close eyes/ release hand: Obeys both correctly = 0.    Best gaze: Normal = 0.    Visual field testing: No visual field loss = 0.     Facial paresis: Normal symmetric movement = 0.    Motor function left arm: Normal = 0.    Motor function right arm: Normal = 0.    Motor function left leg: Normal = 0.    Motor function right leg: Normal = 0.    Limb ataxia   Sensory: Normal = 0.    Best language: No aphasia = 0.    Dysarthria: Normal articulation = 0.    Extinction and inattention: Normal = 0.    Total score: 0 .    Critical care note   Total time: 90 minutes spent engaged in work directly related to patient care and/ or available for direct patient care.   Critical condition(s) addressed for impending deterioration include: central nervous system.   Management: bedside assessment.      Impression and Plan   Diagnosis   Altered mental status (LUG03-NM R41.82)      Calls-Consults   -  8/10/2019 21:30:00 , Internal Medicine, consult.    Plan   Condition: Improved, Stable.    Disposition: Admit time  8/10/2019 21:30:00, Place in Observation Telemetry Unit.    Counseled: Patient, Family, Regarding diagnosis, Regarding diagnostic results, Regarding treatment plan, Patient indicated understanding of instructions.

## 2022-05-04 ENCOUNTER — TELEPHONE (OUTPATIENT)
Dept: INTERNAL MEDICINE | Facility: CLINIC | Age: 52
End: 2022-05-04

## 2022-05-04 NOTE — TELEPHONE ENCOUNTER
Patient called and stated she noticed a week ago on her big toe left foot a dark line going up and she's concerned about the problem. Please advise.

## 2022-05-05 NOTE — TELEPHONE ENCOUNTER
"Spoke with patient, she stated " This is not urgent I just wanted her to be aware, I can wait to go to my next appointment".   "

## 2022-05-20 NOTE — HISTORICAL [facility]ER
This is a historical note converted from [employer]. Formatting and pictures may have been removed.  Please reference [employer] for original formatting and attached multimedia. History of Present Illness  Patient presents today for colonoscopy. She had a positive FIT test this year. No prior colonoscopy. They tolerated bowel prep in entirety and is having clear/yellow stools. Denies any abdominal pain, change in bowel or bladder habits, BRBPR, weight loss, fevers/chills.?No history of colon cancer, IBD.  Review of Systems  Negative except as above  Physical Exam  Vitals & Measurements  T:?36.7? ?C (Oral)? HR:?64(Monitored)? RR:?18? BP:?120/82? SpO2:?100%? WT:?93.4?kg? BMI:?36.39?  Gen: well appearing, NAD  HEENT: normocephalic, atraumatic  CV: RRR  Pulm: no increased work of breathing, equal chest rise  Abd: soft, NT/ND  MSK: no diffuse rash, no injury or deformity  Neuro: no focal deficits, normal gait  Assessment/Plan  Patient is a 52 y/o female?with diagnosis of HTN, HLD, DM2, Hypothyroidism, Migraines presenting for colonoscopy  ?  - risks and benefits of procedure discussed and consent obtained  - follow up pending findings  ?   Yane Kaye PGY2  ?  ?  ?   Agree w above. FIT (+). Colonoscopy.   Problem List/Past Medical History  Ongoing  Diabetes  HLD (hyperlipidemia)  HTN (hypertension)  Hypothyroid  Obesity  Historical  Headache, migraine  HTN (hypertension)  Hx of diabetes mellitus  Pregnant  Pregnant  Pregnant  Procedure/Surgical History  Drainage of Abdomen Skin, External Approach (2020)  Incision and drainage of abscess (eg, carbuncle, suppurative hidradenitis, cutaneous or subcutaneous abscess, cyst, furuncle, or paronychia); complicated or multiple (2020)  Electroencephalogram (EEG); including recording awake and asleep (2019)  Monitoring of Central Nervous Electrical Activity, External Approach (2019)  Gallbladder (2008)  c section x 2   section    Medications  Inpatient  buffered lidocaine 2% - 0.5 ml syringe, 10 mg= 0.5 mL, Subcutaneous, As Directed  IVF Lactated Ringers LR Infusion 1,000 mL, 1000 mL, IV  Home  amlodipine 10 mg oral tablet, 10 mg= 1 tab(s), Oral, Daily, 1 refills  gabapentin 100 mg oral capsule, 100 mg= 1 cap(s), Oral, BID  glimepiride 4 mg oral tablet, 4 mg= 1 tab(s), Oral, BID  Levemir FlexPen 100 units/mL subcutaneous solution, 15 units, Subcutaneous, Once a day (at bedtime)  levothyroxine 100 mcg (0.1 mg) oral tablet, 100 mcg= 1 tab(s), Oral, Daily, 1 refills  lisinopril 20 mg oral tablet, 20 mg= 1 tab(s), Oral, Daily  meclizine 12.5 mg oral tablet, 12.5 mg= 1 tab(s), Oral, Daily, 1 refills  metFORMIN 1000 mg oral tablet, 1000 mg= 1 tab(s), Oral, BID  pen needles, See Instructions, 11 refills  rosuvastatin 20 mg oral tablet, 20 mg= 1 tab(s), Oral, Daily, 1 refills  topiramate 100 mg oral tablet, 100 mg= 1 tab(s), Oral, qPM  Vitamin D 50,000 intl units oral capsule, 60960 IntUnit= 1 cap(s), Oral, qWeek  Allergies  No Known Allergies  Social History  Abuse/Neglect  No, No, Yes, 03/18/2022  Alcohol - Denies Alcohol Use, 01/12/2013  Current, Wine, 1-2 times per month, Alcohol use interferes with work or home: No. Others hurt by drinking: No. Household alcohol concerns: No., 02/18/2022  Never, 02/18/2022  Current, Wine, 1-2 times per month, 02/07/2022  Current, Wine, 1-2 times per month, 08/18/2021  Current, 1-2 times per month, 02/04/2021  Employment/School  Unemployed, 02/04/2021  Exercise  Exercise duration: 0., 02/04/2021  Financial/Legal Situation  None, Social Security Disability, 03/04/2022  Home/Environment  Lives with Spouse., 02/04/2021    Never in , 02/18/2021  Nutrition/Health  Regular, Diabetic, 02/04/2021  Sexual  Sexually active: No. Yes, 02/04/2021  Spiritual/Cultural  [employer]eh[employer] witness, 03/10/2020  Substance Use - Denies Substance Abuse, 06/29/2014  Never, 02/18/2022  Never, 08/18/2021  Never,  02/04/2021  Tobacco - Denies Tobacco Use, 01/12/2013  Never (less than 100 in lifetime), N/A, 03/18/2022  Family History  CHF - Congestive heart failure: Mother.  Diabetes mellitus: Father.  Immunizations  Vaccine Date Status Comments   zoster vaccine, inactivated 02/18/2022 Given    influenza virus vaccine, inactivated 02/18/2022 Given    COVID-19 mRNA, LNP-S, PF - Moderna 01/02/2022 Recorded    COVID-19 MRNA, LNP-S, PF- Pfizer 04/28/2021 Given    COVID-19 MRNA, LNP-S, PF- Pfizer 04/07/2021 Given    zoster vaccine, inactivated 02/18/2021 Given    influenza virus vaccine, inactivated 02/18/2021 Given    tetanus/diphtheria/pertussis, acel(Tdap) 08/17/2020 Given    influenza virus vaccine, inactivated 01/31/2018 Recorded    influenza virus vaccine, inactivated 11/12/2011 Recorded 2020-01-30: UNKNOWN CAMPAIGNID   poliovirus vaccine, inactivated 10/30/1984 Recorded    measles/mumps/rubella virus vaccine 08/15/1972 Recorded    poliovirus vaccine, inactivated 07/18/1972 Recorded    poliovirus vaccine, inactivated 04/15/1969 Recorded

## 2022-05-23 PROBLEM — I10 ESSENTIAL HYPERTENSION: Chronic | Status: ACTIVE | Noted: 2022-05-23

## 2022-05-23 PROBLEM — E78.2 MIXED HYPERLIPIDEMIA: Chronic | Status: ACTIVE | Noted: 2022-05-23

## 2022-05-23 PROBLEM — G43.009 MIGRAINE HEADACHE WITHOUT AURA: Chronic | Status: ACTIVE | Noted: 2022-05-23

## 2022-05-23 PROBLEM — E03.9 HYPOTHYROIDISM (ACQUIRED): Chronic | Status: ACTIVE | Noted: 2022-05-23

## 2022-05-23 PROBLEM — E11.9 TYPE 2 DIABETES MELLITUS WITHOUT COMPLICATION, WITHOUT LONG-TERM CURRENT USE OF INSULIN: Chronic | Status: ACTIVE | Noted: 2022-05-23

## 2022-06-06 ENCOUNTER — OFFICE VISIT (OUTPATIENT)
Dept: INTERNAL MEDICINE | Facility: CLINIC | Age: 52
End: 2022-06-06

## 2022-06-06 VITALS
HEART RATE: 62 BPM | RESPIRATION RATE: 18 BRPM | BODY MASS INDEX: 36.62 KG/M2 | SYSTOLIC BLOOD PRESSURE: 136 MMHG | TEMPERATURE: 99 F | WEIGHT: 199 LBS | DIASTOLIC BLOOD PRESSURE: 78 MMHG | HEIGHT: 62 IN

## 2022-06-06 DIAGNOSIS — E03.9 HYPOTHYROIDISM (ACQUIRED): Chronic | ICD-10-CM

## 2022-06-06 DIAGNOSIS — E66.9 CLASS 2 OBESITY WITHOUT SERIOUS COMORBIDITY WITH BODY MASS INDEX (BMI) OF 36.0 TO 36.9 IN ADULT, UNSPECIFIED OBESITY TYPE: Chronic | ICD-10-CM

## 2022-06-06 DIAGNOSIS — E11.9 TYPE 2 DIABETES MELLITUS WITHOUT COMPLICATION, WITHOUT LONG-TERM CURRENT USE OF INSULIN: Primary | Chronic | ICD-10-CM

## 2022-06-06 DIAGNOSIS — E78.2 MIXED HYPERLIPIDEMIA: Chronic | ICD-10-CM

## 2022-06-06 DIAGNOSIS — I10 ESSENTIAL HYPERTENSION: Chronic | ICD-10-CM

## 2022-06-06 PROBLEM — E66.812 CLASS 2 OBESITY WITHOUT SERIOUS COMORBIDITY WITH BODY MASS INDEX (BMI) OF 36.0 TO 36.9 IN ADULT: Chronic | Status: ACTIVE | Noted: 2022-06-06

## 2022-06-06 PROCEDURE — 99214 OFFICE O/P EST MOD 30 MIN: CPT | Mod: PBBFAC | Performed by: NURSE PRACTITIONER

## 2022-06-06 PROCEDURE — 99214 PR OFFICE/OUTPT VISIT, EST, LEVL IV, 30-39 MIN: ICD-10-PCS | Mod: S$PBB,,, | Performed by: NURSE PRACTITIONER

## 2022-06-06 PROCEDURE — 99214 OFFICE O/P EST MOD 30 MIN: CPT | Mod: S$PBB,,, | Performed by: NURSE PRACTITIONER

## 2022-06-06 RX ORDER — GABAPENTIN 100 MG/1
100 CAPSULE ORAL 2 TIMES DAILY
Qty: 180 CAPSULE | Refills: 2 | Status: SHIPPED | OUTPATIENT
Start: 2022-06-06 | End: 2022-12-05 | Stop reason: SDUPTHER

## 2022-06-06 RX ORDER — LEVOTHYROXINE SODIUM 100 UG/1
100 TABLET ORAL DAILY
Qty: 90 TABLET | Refills: 2 | Status: SHIPPED | OUTPATIENT
Start: 2022-06-06 | End: 2022-12-05 | Stop reason: SDUPTHER

## 2022-06-06 RX ORDER — AMLODIPINE BESYLATE 10 MG/1
10 TABLET ORAL DAILY
Qty: 90 TABLET | Refills: 2 | Status: SHIPPED | OUTPATIENT
Start: 2022-06-06 | End: 2022-12-05 | Stop reason: SDUPTHER

## 2022-06-06 RX ORDER — GLIMEPIRIDE 4 MG/1
4 TABLET ORAL 2 TIMES DAILY
Qty: 180 TABLET | Refills: 2 | Status: SHIPPED | OUTPATIENT
Start: 2022-06-06 | End: 2022-12-05 | Stop reason: SDUPTHER

## 2022-06-06 RX ORDER — ROSUVASTATIN CALCIUM 20 MG/1
20 TABLET, COATED ORAL DAILY
Qty: 90 TABLET | Refills: 2 | Status: SHIPPED | OUTPATIENT
Start: 2022-06-06 | End: 2022-12-05

## 2022-06-06 RX ORDER — LISINOPRIL 20 MG/1
20 TABLET ORAL DAILY
Qty: 90 TABLET | Refills: 2 | Status: SHIPPED | OUTPATIENT
Start: 2022-06-06 | End: 2022-12-05 | Stop reason: SDUPTHER

## 2022-06-06 RX ORDER — TOPIRAMATE 100 MG/1
TABLET, FILM COATED ORAL
Qty: 30 TABLET | Refills: 0 | Status: SHIPPED | OUTPATIENT
Start: 2022-06-06 | End: 2022-11-09

## 2022-06-06 RX ORDER — METFORMIN HYDROCHLORIDE 1000 MG/1
1000 TABLET ORAL 2 TIMES DAILY
Qty: 180 TABLET | Refills: 2 | Status: SHIPPED | OUTPATIENT
Start: 2022-06-06 | End: 2022-12-05 | Stop reason: SDUPTHER

## 2022-06-06 NOTE — ASSESSMENT & PLAN NOTE
B/P: 136/78  Continue Amlodipine to 10 mg daily, Lisinopril 20 mg daily.   DASH diet  Encouraged home blood pressure monitoring

## 2022-06-06 NOTE — ASSESSMENT & PLAN NOTE
Continue Glimepiride 4 mg bid, Metformin 1,000 mg bid  Continue home CBG monitoring.  Hypoglycemic episodes: denies  BMI: 36.40  HgbA1c: 8.6  UA Creatinine: 40.2  UA Microalbumin: 48.9  On Rosuvastatin  Weight Loss Encouraged  ADA Diet

## 2022-06-06 NOTE — ASSESSMENT & PLAN NOTE
Continue Rosuvastatin 20 mg daily  Weight Loss encouraged.  Low fat/ high fiber diet.  Increase physical activity.  Chol: 284   HDL: 56  Tri   LDL: 197

## 2022-06-06 NOTE — PROGRESS NOTES
Subjective:       Patient ID: Felicita Moctezuma is a 51 y.o. female.    Chief Complaint: Follow-up    Patient has diagnosis of HTN, HLD, DM2, Hypothyroidism, Migraines. Patient seen in clinic today for discoloration to right toenail. Patient denies pain, itching, any other complaints. Patient denies injury. States dark discoloration noted for years. Patient last seen in clinic on 03/04/2022 per Telemedicine for follow up on labs. HgbA1c elevated at 8.6, patient was to repeat lab prior to visit since she it compliant with medication. Vitamin D level decreased at 17, patient started on Vitamin D supplement, repeat Vitamin D level ordered. Cholesterol levels elevated, Rosuvastatin increased to 20 mg daily. TSH level elevated, repeat lab ordered and was to be done prior to visit to reassess since patient is taking medication as prescribed. Amlodipine was increased last visit for HTN. B/P improved today. Patient was noncompliant with medications but states she has been taking her medications as prescribed. Repeat labs ordered, patient did not complete prior to visit.       Patient followed by GYN clinic. Patient has follow up appointment scheduled with GYN clinic on 06/15/2022 at 2:00 pm.    Patient referred to Neurology Clinic on 08/18/2021 for Migraines. Patient has appointment scheduled for 07/07/2022.       Mammogram: 03/19/2021, benign  PAP: 02/04/2021  FIT: 02/26/2021, positive  Colonoscopy: 03/18/2022, repeat in 10 years  Diabetic Eye Exam: 08/2021 ([facility])  Diabetic Foot Exam: 02/18/2022  Bone Density: deferred due to age  Medicare Wellness: N/A    Review of Systems   Constitutional: Negative.    HENT: Negative.    Eyes: Negative.    Respiratory: Negative.    Cardiovascular: Negative.    Gastrointestinal: Negative.    Endocrine: Negative.    Genitourinary: Negative.    Musculoskeletal: Negative.    Integumentary:  Negative.   Allergic/Immunologic: Negative.    Neurological: Negative.    Hematological:  Negative.    Psychiatric/Behavioral: Negative.          Objective:      Physical Exam  Vitals reviewed.   Constitutional:       Appearance: Normal appearance.   HENT:      Head: Normocephalic and atraumatic.      Mouth/Throat:      Mouth: Mucous membranes are moist.      Pharynx: Oropharynx is clear.   Eyes:      Extraocular Movements: Extraocular movements intact.      Conjunctiva/sclera: Conjunctivae normal.      Pupils: Pupils are equal, round, and reactive to light.   Cardiovascular:      Rate and Rhythm: Normal rate and regular rhythm.      Pulses:           Dorsalis pedis pulses are 1+ on the right side and 1+ on the left side.      Heart sounds: Normal heart sounds.   Pulmonary:      Effort: Pulmonary effort is normal.      Breath sounds: Normal breath sounds.   Abdominal:      General: Bowel sounds are normal.   Musculoskeletal:         General: Normal range of motion.      Cervical back: Normal range of motion and neck supple.        Feet:    Feet:      Right foot:      Skin integrity: Skin integrity normal.      Left foot:      Skin integrity: Skin integrity normal.   Skin:     General: Skin is warm and dry.   Neurological:      Mental Status: She is alert and oriented to person, place, and time.   Psychiatric:         Mood and Affect: Mood normal.         Behavior: Behavior normal.             Assessment:       Problem List Items Addressed This Visit        Cardiac/Vascular    Essential hypertension (Chronic)     B/P: 136/78  Continue Amlodipine to 10 mg daily, Lisinopril 20 mg daily.   DASH diet  Encouraged home blood pressure monitoring             Relevant Orders    CBC Auto Differential    Comprehensive Metabolic Panel    Microalbumin/Creatinine Ratio, Urine    TSH    Urinalysis, Reflex to Urine Culture Urine, Clean Catch    Mixed hyperlipidemia (Chronic)     Continue Rosuvastatin 20 mg daily  Weight Loss encouraged.  Low fat/ high fiber diet.  Increase physical activity.  Chol: 284   HDL: 56  Trig:  155   LDL: 197             Relevant Orders    Lipid Panel       Endocrine    Type 2 diabetes mellitus without complication, without long-term current use of insulin - Primary (Chronic)     Continue Glimepiride 4 mg bid, Metformin 1,000 mg bid  Continue home CBG monitoring.  Hypoglycemic episodes: denies  BMI: 36.40  HgbA1c: 8.6  UA Creatinine: 40.2  UA Microalbumin: 48.9  On Rosuvastatin  Weight Loss Encouraged  ADA Diet           Relevant Medications    metFORMIN (GLUCOPHAGE) 1000 MG tablet    glimepiride (AMARYL) 4 MG tablet    Other Relevant Orders    CBC Auto Differential    Comprehensive Metabolic Panel    Microalbumin/Creatinine Ratio, Urine    Urinalysis, Reflex to Urine Culture Urine, Clean Catch    Hemoglobin A1C    Hypothyroidism (acquired) (Chronic)     TSH level: 16.34  Free T4: 0.78  Continue Levothyroxine 100 mcg daily           Class 2 obesity without serious comorbidity with body mass index (BMI) of 36.0 to 36.9 in adult (Chronic)     BMI: 36.40  Weight loss encouraged  Increase physical activity as tolerated                 Plan:    Follow up in 1 months with virtual visit for follow up on labs.

## 2022-07-01 ENCOUNTER — LAB VISIT (OUTPATIENT)
Dept: LAB | Facility: HOSPITAL | Age: 52
End: 2022-07-01
Attending: NURSE PRACTITIONER

## 2022-07-01 DIAGNOSIS — E78.2 MIXED HYPERLIPIDEMIA: Chronic | ICD-10-CM

## 2022-07-01 DIAGNOSIS — I10 ESSENTIAL HYPERTENSION: ICD-10-CM

## 2022-07-01 DIAGNOSIS — E11.9 TYPE 2 DIABETES MELLITUS WITHOUT COMPLICATION, WITHOUT LONG-TERM CURRENT USE OF INSULIN: ICD-10-CM

## 2022-07-01 LAB
ALBUMIN SERPL-MCNC: 4.2 GM/DL (ref 3.5–5)
ALBUMIN/GLOB SERPL: 1.1 RATIO (ref 1.1–2)
ALP SERPL-CCNC: 73 UNIT/L (ref 40–150)
ALT SERPL-CCNC: 56 UNIT/L (ref 0–55)
APPEARANCE UR: CLEAR
AST SERPL-CCNC: 37 UNIT/L (ref 5–34)
BACTERIA #/AREA URNS AUTO: ABNORMAL /HPF
BASOPHILS # BLD AUTO: 0.04 X10(3)/MCL (ref 0–0.2)
BASOPHILS NFR BLD AUTO: 1.3 %
BILIRUB UR QL STRIP.AUTO: NEGATIVE MG/DL
BILIRUBIN DIRECT+TOT PNL SERPL-MCNC: 0.5 MG/DL
BUN SERPL-MCNC: 12.8 MG/DL (ref 9.8–20.1)
CALCIUM SERPL-MCNC: 10.2 MG/DL (ref 8.4–10.2)
CHLORIDE SERPL-SCNC: 105 MMOL/L (ref 98–107)
CHOLEST SERPL-MCNC: 226 MG/DL
CHOLEST/HDLC SERPL: 5 {RATIO} (ref 0–5)
CO2 SERPL-SCNC: 26 MMOL/L (ref 22–29)
COLOR UR AUTO: ABNORMAL
CREAT SERPL-MCNC: 0.99 MG/DL (ref 0.55–1.02)
CREAT UR-MCNC: 72.3 MG/DL (ref 47–110)
EOSINOPHIL # BLD AUTO: 0.24 X10(3)/MCL (ref 0–0.9)
EOSINOPHIL NFR BLD AUTO: 7.6 %
ERYTHROCYTE [DISTWIDTH] IN BLOOD BY AUTOMATED COUNT: 12.7 % (ref 11.5–17)
EST. AVERAGE GLUCOSE BLD GHB EST-MCNC: 182.9 MG/DL
GLOBULIN SER-MCNC: 3.7 GM/DL (ref 2.4–3.5)
GLUCOSE SERPL-MCNC: 130 MG/DL (ref 74–100)
GLUCOSE UR QL STRIP.AUTO: NORMAL MG/DL
HBA1C MFR BLD: 8 %
HCT VFR BLD AUTO: 43.9 % (ref 37–47)
HDLC SERPL-MCNC: 50 MG/DL (ref 35–60)
HGB BLD-MCNC: 14.3 GM/DL (ref 12–16)
HYALINE CASTS #/AREA URNS LPF: ABNORMAL /LPF
IMM GRANULOCYTES # BLD AUTO: 0.01 X10(3)/MCL (ref 0–0.04)
IMM GRANULOCYTES NFR BLD AUTO: 0.3 %
KETONES UR QL STRIP.AUTO: NEGATIVE MG/DL
LDLC SERPL CALC-MCNC: 145 MG/DL (ref 50–140)
LEUKOCYTE ESTERASE UR QL STRIP.AUTO: NEGATIVE UNIT/L
LYMPHOCYTES # BLD AUTO: 1.55 X10(3)/MCL (ref 0.6–4.6)
LYMPHOCYTES NFR BLD AUTO: 49.2 %
MCH RBC QN AUTO: 29.1 PG (ref 27–31)
MCHC RBC AUTO-ENTMCNC: 32.6 MG/DL (ref 33–36)
MCV RBC AUTO: 89.4 FL (ref 80–94)
MICROALBUMIN UR-MCNC: 14.8 UG/ML
MICROALBUMIN/CREAT RATIO PNL UR: 20.5 MG/GM CR (ref 0–30)
MONOCYTES # BLD AUTO: 0.15 X10(3)/MCL (ref 0.1–1.3)
MONOCYTES NFR BLD AUTO: 4.8 %
MUCOUS THREADS URNS QL MICRO: ABNORMAL /LPF
NEUTROPHILS # BLD AUTO: 1.2 X10(3)/MCL (ref 2.1–9.2)
NEUTROPHILS NFR BLD AUTO: 36.8 %
NITRITE UR QL STRIP.AUTO: NEGATIVE
NRBC BLD AUTO-RTO: 0 %
PH UR STRIP.AUTO: 6 [PH]
PLATELET # BLD AUTO: 192 X10(3)/MCL (ref 130–400)
PMV BLD AUTO: 10.6 FL (ref 7.4–10.4)
POTASSIUM SERPL-SCNC: 3.8 MMOL/L (ref 3.5–5.1)
PROT SERPL-MCNC: 7.9 GM/DL (ref 6.4–8.3)
PROT UR QL STRIP.AUTO: NEGATIVE MG/DL
RBC # BLD AUTO: 4.91 X10(6)/MCL (ref 4.2–5.4)
RBC #/AREA URNS AUTO: ABNORMAL /HPF
RBC UR QL AUTO: NEGATIVE UNIT/L
SODIUM SERPL-SCNC: 139 MMOL/L (ref 136–145)
SP GR UR STRIP.AUTO: 1.01
SQUAMOUS #/AREA URNS LPF: ABNORMAL /HPF
TRIGL SERPL-MCNC: 154 MG/DL (ref 37–140)
TSH SERPL-ACNC: 2.4 UIU/ML (ref 0.35–4.94)
UROBILINOGEN UR STRIP-ACNC: NORMAL MG/DL
VLDLC SERPL CALC-MCNC: 31 MG/DL
WBC # SPEC AUTO: 3.2 X10(3)/MCL (ref 4.5–11.5)
WBC #/AREA URNS AUTO: ABNORMAL /HPF

## 2022-07-01 PROCEDURE — 36415 COLL VENOUS BLD VENIPUNCTURE: CPT

## 2022-07-01 PROCEDURE — 84443 ASSAY THYROID STIM HORMONE: CPT

## 2022-07-01 PROCEDURE — 85025 COMPLETE CBC W/AUTO DIFF WBC: CPT

## 2022-07-01 PROCEDURE — 82043 UR ALBUMIN QUANTITATIVE: CPT

## 2022-07-01 PROCEDURE — 81001 URINALYSIS AUTO W/SCOPE: CPT

## 2022-07-01 PROCEDURE — 80061 LIPID PANEL: CPT

## 2022-07-01 PROCEDURE — 80053 COMPREHEN METABOLIC PANEL: CPT

## 2022-07-01 PROCEDURE — 83036 HEMOGLOBIN GLYCOSYLATED A1C: CPT

## 2022-07-06 ENCOUNTER — OFFICE VISIT (OUTPATIENT)
Dept: INTERNAL MEDICINE | Facility: CLINIC | Age: 52
End: 2022-07-06

## 2022-07-06 DIAGNOSIS — E11.9 TYPE 2 DIABETES MELLITUS WITHOUT COMPLICATION, WITHOUT LONG-TERM CURRENT USE OF INSULIN: Chronic | ICD-10-CM

## 2022-07-06 DIAGNOSIS — E78.2 MIXED HYPERLIPIDEMIA: Chronic | ICD-10-CM

## 2022-07-06 DIAGNOSIS — E66.09 CLASS 2 OBESITY DUE TO EXCESS CALORIES WITHOUT SERIOUS COMORBIDITY WITH BODY MASS INDEX (BMI) OF 36.0 TO 36.9 IN ADULT: Chronic | ICD-10-CM

## 2022-07-06 DIAGNOSIS — Z12.31 ENCOUNTER FOR SCREENING MAMMOGRAM FOR MALIGNANT NEOPLASM OF BREAST: ICD-10-CM

## 2022-07-06 DIAGNOSIS — D72.818 OTHER DECREASED WHITE BLOOD CELL (WBC) COUNT: ICD-10-CM

## 2022-07-06 DIAGNOSIS — R74.8 ELEVATED LIVER ENZYMES: ICD-10-CM

## 2022-07-06 DIAGNOSIS — E03.9 HYPOTHYROIDISM (ACQUIRED): Chronic | ICD-10-CM

## 2022-07-06 DIAGNOSIS — I10 ESSENTIAL HYPERTENSION: Primary | Chronic | ICD-10-CM

## 2022-07-06 DIAGNOSIS — E11.65 TYPE 2 DIABETES MELLITUS WITH HYPERGLYCEMIA, WITHOUT LONG-TERM CURRENT USE OF INSULIN: ICD-10-CM

## 2022-07-06 PROBLEM — D72.819 LEUCOPENIA: Status: ACTIVE | Noted: 2022-07-06

## 2022-07-06 PROCEDURE — 99214 OFFICE O/P EST MOD 30 MIN: CPT | Mod: FQ,95,, | Performed by: NURSE PRACTITIONER

## 2022-07-06 PROCEDURE — 99214 PR OFFICE/OUTPT VISIT, EST, LEVL IV, 30-39 MIN: ICD-10-PCS | Mod: FQ,95,, | Performed by: NURSE PRACTITIONER

## 2022-07-06 RX ORDER — PIOGLITAZONEHYDROCHLORIDE 15 MG/1
15 TABLET ORAL DAILY
Qty: 30 TABLET | Refills: 4 | Status: SHIPPED | OUTPATIENT
Start: 2022-07-06 | End: 2022-12-05 | Stop reason: SDUPTHER

## 2022-07-06 NOTE — ASSESSMENT & PLAN NOTE
Restart Rosuvastatin 20 mg daily  Weight Loss encouraged.  Low fat/ high fiber diet.  Increase physical activity.  Chol: 226  HDL: 50  Tri  LDL: 145

## 2022-07-06 NOTE — ASSESSMENT & PLAN NOTE
Start Actos 15 mg daily  Continue Glimepiride 4 mg bid, Metformin 1,000 mg bid  Encouraged home CBG monitoring.  Hypoglycemic episodes: denies  BMI: 36.40  HgbA1c: 8.0  UA Creatinine: 72.3  UA Microalbumin: 14.8  On Rosuvastatin  Weight Loss Encouraged  ADA Diet

## 2022-07-06 NOTE — ASSESSMENT & PLAN NOTE
BMI: 36.4  TSH: 2.39  Vitamin D level: ordered  HgbA1c: 8.0  Weight Loss Encouraged  Increase Physical Activity

## 2022-07-06 NOTE — ASSESSMENT & PLAN NOTE
AST: 37 (prev 92)  ALT: 56 (prev 56)  Repeat CMP ordered for 11/2022  Patient denies abdominal pain.

## 2022-07-06 NOTE — ASSESSMENT & PLAN NOTE
B/P: deferred due to audio visit  Continue Amlodipine to 10 mg daily, Lisinopril 20 mg daily.   DASH diet  Encouraged home blood pressure monitoring

## 2022-07-06 NOTE — PROGRESS NOTES
Subjective:       Patient ID: Felicita Moctezuma is a 51 y.o. female.    Chief Complaint: Follow-up (HA)    Established Patient - Audio Only Telehealth Visit     The patient location is: home  The chief complaint leading to consultation is: follow up  Visit type: Virtual visit with audio only (telephone)  Total time spent with patient: 20 minutes    The reason for the audio only service rather than synchronous audio and video virtual visit was related to technical difficulties or patient preference/necessity.    Each patient to whom I provide medical services by telemedicine is:  (1) informed of the relationship between the physician and patient and the respective role of any other health care provider with respect to management of the patient; and (2) notified that they may decline to receive medical services by telemedicine and may withdraw from such care at any time. Patient verbally consented to receive this service via voice-only telephone call.    This service was not originating from a related E/M service provided within the previous 7 days nor will  to an E/M service or procedure within the next 24 hours or my soonest available appointment.  Prevailing standard of care was able to be met in this audio-only visit.          Patient has diagnosis of HTN, HLD, DM2, Hypothyroidism, Migraines. Patient seen per audio visit today for follow up. Patient last seen in clinic on 06/06/2022. Labs done. HgbA1c elevated at 8.0. Cholesterol levels elevated, Rosuvastatin was increased to 20 mg daily however patient states she hasn't been taking medication because she thought that was making her blood sugars high. Patient noncompliant with medications. Today, patient denies any acute complaints.        Patient followed by GYN clinic. Patient has follow up appointment scheduled with GYN clinic on 06/15/2022, was a no show.      Patient referred to Neurology Clinic on 08/18/2021 for Migraines. Patient has appointment scheduled  for 2022.         Mammogram: 2021, benign, ordered 2022  PAP: 2021  FIT: 2021, positive  Colonoscopy: 2022, recommend repeat in 10 years  Diabetic Eye Exam: 2021 ([facility])  Diabetic Foot Exam: 2022  Bone Density: deferred due to age  Medicare Wellness: N/A    Review of Systems   Constitutional: Negative.    HENT: Negative.    Eyes: Negative.    Respiratory: Negative.    Cardiovascular: Negative.    Gastrointestinal: Negative.    Endocrine: Negative.    Genitourinary: Negative.    Musculoskeletal: Negative.    Integumentary:  Negative.   Allergic/Immunologic: Negative.    Neurological: Negative.    Hematological: Negative.    Psychiatric/Behavioral: Negative.          Objective:      Physical Exam  Neurological:      Mental Status: She is alert and oriented to person, place, and time.   Psychiatric:         Mood and Affect: Mood normal.         Assessment:       Problem List Items Addressed This Visit        Cardiac/Vascular    Essential hypertension - Primary (Chronic)     B/P: deferred due to audio visit  Continue Amlodipine to 10 mg daily, Lisinopril 20 mg daily.   DASH diet  Encouraged home blood pressure monitoring           Mixed hyperlipidemia (Chronic)     Restart Rosuvastatin 20 mg daily  Weight Loss encouraged.  Low fat/ high fiber diet.  Increase physical activity.  Chol: 226  HDL: 50  Tri  LDL: 145           Relevant Orders    Lipid Panel       Renal/    Encounter for screening mammogram for malignant neoplasm of breast    Relevant Orders    Mammo Digital Screening Bilat w/ Rahul       Oncology    Leucopenia     WBC: 3.2  Neutrophil: 1.2  Repeat CBC with peripheral smear ordered for 2022           Relevant Orders    Path Review, Peripheral Smear       Endocrine    Type 2 diabetes mellitus with hyperglycemia, without long-term current use of insulin (Chronic)     Start Actos 15 mg daily  Continue Glimepiride 4 mg bid, Metformin 1,000 mg  bid  Encouraged home CBG monitoring.  Hypoglycemic episodes: denies  BMI: 36.40  HgbA1c: 8.0  UA Creatinine: 72.3  UA Microalbumin: 14.8  On Rosuvastatin  Weight Loss Encouraged  ADA Diet           Relevant Medications    pioglitazone (ACTOS) 15 MG tablet    Hypothyroidism (acquired) (Chronic)     TSH: 2.39  Continue Euthyrox 100 mcg daily           Class 2 obesity without serious comorbidity with body mass index (BMI) of 36.0 to 36.9 in adult (Chronic)     BMI: 36.4  TSH: 2.39  Vitamin D level: ordered  HgbA1c: 8.0  Weight Loss Encouraged  Increase Physical Activity           Relevant Orders    Vitamin D       GI    Elevated liver enzymes     AST: 37 (prev 92)  ALT: 56 (prev 56)  Repeat CMP ordered for 11/2022  Patient denies abdominal pain.                  Plan:    Patient to follow up in 4 months with labs to be done prior to visit to reassess DM2 and HLD.

## 2022-11-08 NOTE — PROGRESS NOTES
"[facility] Neurology Initial Office Visit Note    Initial Visit Date: 11/9/2022  Current Visit Date:  11/09/2022    Chief Complaint:     Chief Complaint   Patient presents with    Initial visit for migraines     Reports daily migraines "with aura." States that she "can't see when she gets the aura." She reports a headache now, rating 1/10 on the pain scale. Today it's more like the headache is "in the back of her mind."       History of Present Illness:      This is 52 y.o. female with history of hypertension, hyperlipidemia, diabetes type 2, hypothyroidism, obesity, transaminitis who is referred headache disorder.    Age of Onset : 18 years old     Headache Description: left retro-orbital, sharp, stabbing, seconds, lasting days, with OS lacrimation. Unclear if there is ptosis, conjunctival injection, miosis/mydriasis    Frequency: daily headache days for the past 20+ years.     Provocation Factors: smells     Risk Factors  - Family history of headache disorder: Yes son, mother, and sister with headache.   - History of focal CNS lesions: No  - History of CNS infections: No  - History head trauma: No  - History of underlying mood disorder: No  - History of sleep disorder: Yes trouble falling asleep at night.   - Recreational drug use: No  - Tobacco use: No  - Alcohol use: Yes occasional  - Weight fluctuation: Not Applicable  - Isotretinoin or Tetracycline use:  Not Applicable  - Family planning and contraceptive use: Not Applicable    Medications:     Current Prophylactic  Gabapentin 100 mg twice a day   Lisinopril 20 mg once a day     Current Abortive  Tylenol - ineffective     Prior Prophylactic  Topiramate 100 mg once at night - ran out of refills    Prior Abortive  Ibuprofen PRN - ineffecitve     Devices:     - VNS:  - TNS  - TMS:     Procedures:     - Botox:  - PSG block:   - Occipital nerve block:     Labs:     Results for orders placed or performed in visit on 07/01/22   Comprehensive Metabolic Panel   Result Value " Ref Range    Sodium Level 139 136 - 145 mmol/L    Potassium Level 3.8 3.5 - 5.1 mmol/L    Chloride 105 98 - 107 mmol/L    Carbon Dioxide 26 22 - 29 mmol/L    Glucose Level 130 (H) 74 - 100 mg/dL    Blood Urea Nitrogen 12.8 9.8 - 20.1 mg/dL    Creatinine 0.99 0.55 - 1.02 mg/dL    Calcium Level Total 10.2 8.4 - 10.2 mg/dL    Protein Total 7.9 6.4 - 8.3 gm/dL    Albumin Level 4.2 3.5 - 5.0 gm/dL    Globulin 3.7 (H) 2.4 - 3.5 gm/dL    Albumin/Globulin Ratio 1.1 1.1 - 2.0 ratio    Bilirubin Total 0.5 <=1.5 mg/dL    Alkaline Phosphatase 73 40 - 150 unit/L    Alanine Aminotransferase 56 (H) 0 - 55 unit/L    Aspartate Aminotransferase 37 (H) 5 - 34 unit/L    Estimated GFR-Non  >60 mls/min/1.73/m2   Lipid Panel   Result Value Ref Range    Cholesterol Total 226 (H) <=200 mg/dL    HDL Cholesterol 50 35 - 60 mg/dL    Triglyceride 154 (H) 37 - 140 mg/dL    Cholesterol/HDL Ratio 5 0 - 5    Very Low Density Lipoprotein 31     LDL Cholesterol 145.00 (H) 50.00 - 140.00 mg/dL   TSH   Result Value Ref Range    Thyroid Stimulating Hormone 2.3956 0.3500 - 4.9400 uIU/mL   Hemoglobin A1C   Result Value Ref Range    Hemoglobin A1c 8.0 (H) <=7.0 %    Estimated Average Glucose 182.9 mg/dL   CBC with Differential   Result Value Ref Range    WBC 3.2 (L) 4.5 - 11.5 x10(3)/mcL    RBC 4.91 4.20 - 5.40 x10(6)/mcL    Hgb 14.3 12.0 - 16.0 gm/dL    Hct 43.9 37.0 - 47.0 %    MCV 89.4 80.0 - 94.0 fL    MCH 29.1 27.0 - 31.0 pg    MCHC 32.6 (L) 33.0 - 36.0 mg/dL    RDW 12.7 11.5 - 17.0 %    Platelet 192 130 - 400 x10(3)/mcL    MPV 10.6 (H) 7.4 - 10.4 fL    Neut % 36.8 %    Lymph % 49.2 %    Mono % 4.8 %    Eos % 7.6 %    Basophil % 1.3 %    Lymph # 1.55 0.6 - 4.6 x10(3)/mcL    Neut # 1.2 (L) 2.1 - 9.2 x10(3)/mcL    Mono # 0.15 0.1 - 1.3 x10(3)/mcL    Eos # 0.24 0 - 0.9 x10(3)/mcL    Baso # 0.04 0 - 0.2 x10(3)/mcL    IG# 0.01 0 - 0.04 x10(3)/mcL    IG% 0.3 %    NRBC% 0.0 %   Microalbumin/Creatinine Ratio, Urine   Result Value Ref Range     Urine Microalbumin 14.8 <=30.0 ug/ml    Urine Creatinine 72.3 47.0 - 110.0 mg/dL    Microalbumin Creatinine Ratio 20.5 0.0 - 30.0 mg/gm Cr   Urinalysis, Reflex to Urine Culture Urine, Clean Catch    Specimen: Urine   Result Value Ref Range    Color, UA Light-Yellow (A) Yellow, Colorless, Other, Clear    Appearance, UA Clear Clear    Specific Gravity, UA 1.012     pH, UA 6.0 5.0, 5.5, 6.0, 6.5, 7.0, 7.5, 8.0, 8.5    Protein, UA Negative Negative, 300  mg/dL    Glucose, UA Normal Negative, Normal mg/dL    Ketones, UA Negative Negative, +1, +2, +3, +4, +5, >=160, >=80 mg/dL    Blood, UA Negative Negative unit/L    Bilirubin, UA Negative Negative mg/dL    Urobilinogen, UA Normal 0.2, 1.0, Normal mg/dL    Nitrites, UA Negative Negative    Leukocyte Esterase, UA Negative Negative, 75  unit/L    WBC, UA 0-5 None Seen, 0-2, 3-5, 0-5 /HPF    Bacteria, UA Occ (A) None Seen /HPF    Squamous Epithelial Cells, UA Few (A) None Seen /HPF    Mucous, UA Trace (A) None Seen /LPF    Hyaline Casts, UA None Seen None Seen /lpf    RBC, UA 0-5 None Seen, 0-2, 3-5, 0-5 /HPF       Studies:     - MRI Brain with and without contrast August 12, 2019:  I have reviewed the study independently and with the patient.  Unremarkable.  - MRA Head w/o Myron:   - MRV Head w/o Myron:   - NCHCT:  - Lumbar Puncture:    Review of Systems:     Review of Systems   All other systems reviewed and are negative.    Physical Exams:     Vitals:    11/09/22 0957   BP: 133/86   Pulse: (!) 58   Resp: 20   Temp: 97.9 °F (36.6 °C)       Physical Exam  Vitals and nursing note reviewed.   Constitutional:       Appearance: Normal appearance.   HENT:      Head: Normocephalic and atraumatic.      Nose: Nose normal.      Mouth/Throat:      Mouth: Mucous membranes are moist.      Pharynx: Oropharynx is clear.   Eyes:      Conjunctiva/sclera: Conjunctivae normal.   Cardiovascular:      Rate and Rhythm: Normal rate and regular rhythm.      Pulses: Normal pulses.   Pulmonary:       Effort: Pulmonary effort is normal.      Breath sounds: Normal breath sounds.   Abdominal:      General: Abdomen is flat.   Musculoskeletal:         General: Normal range of motion.      Cervical back: Normal range of motion.   Skin:     General: Skin is warm.   Neurological:      Mental Status: She is alert.       Comprehensive Neurological Exam:  Mental Status: Alert Oriented to Self, Date, and Place. Comprehension wnl. No dysarthria.   CN II - XII: MARYLU with physiological aniscoria OS , Fundus wnl OU, VFFC, No ptosis OU, EOMI without nystagmus LT/Temp symmetric in CN V1-3 distribution, Hearing grossly intact, Face Symmetric, Tongue and Uvula midline, Trapezius symmetric bilateral.   Motor: tone and bulk wnl throughout, no abnormal involuntary or voluntary movements, 5/5 to confrontation, Fine finger movements wnl b/l, No pronator drift.   Sensory: LT, Proprioception,Temp symmetric.  Reflexes: 2+ throughout, plantar reflexes downward bilateral.   Cerebellar: FNF wnl b/l, RAHM wnl b/l  Romberg: Negative  Gait: normal.     Assessment:     This is 52 y.o. female with history of hypertension, hyperlipidemia, diabetes type 2, hypothyroidism, obesity, transaminitis who is referred for SUNCT headache.     Problem List Items Addressed This Visit          Neuro    SUNCT (short unilateral neuralgiform headache, conjunctival inj/tear) - Primary (Chronic)       Cardiac/Vascular    Essential hypertension (Chronic)    Mixed hyperlipidemia (Chronic)       Endocrine    Type 2 diabetes mellitus with hyperglycemia, without long-term current use of insulin (Chronic)    Class 2 obesity without serious comorbidity with body mass index (BMI) of 36.0 to 36.9 in adult (Chronic)       GI    Elevated liver enzymes       Plan:     [] continue with Gabapentin 100 mg twice a day   [] continue with Lisinopril 20 mg once a day   [] stop Topiramate 100 mg once at night   [] start Lamotrigine ER 50 mg daily     RTC 3 months with Telemedicine      Headache education provided: good sleep hygiene and 7 hours of sleep per night, stress management, medication overuse education provided. Using more 3 OTC per week may worsen headaches, high intensity interval training has shown to reduce headache frequency. Low carb, high protein has shown to reduce headache frequency. Patient is instructed in keep headache diary.     I have explained the treatment plan, diagnosis, and prognosis to patient. All questions are answered to the best of my knowledge.     Face to face time 45 minutes, including documentation, chart review, counseling, education, review of test results, relevant medical records, and coordination of care.       Debbie Galleogs MD   General Neurology  11/09/2022

## 2022-11-09 ENCOUNTER — OFFICE VISIT (OUTPATIENT)
Dept: NEUROLOGY | Facility: CLINIC | Age: 52
End: 2022-11-09

## 2022-11-09 VITALS
DIASTOLIC BLOOD PRESSURE: 86 MMHG | HEART RATE: 58 BPM | BODY MASS INDEX: 37.68 KG/M2 | HEIGHT: 63 IN | SYSTOLIC BLOOD PRESSURE: 133 MMHG | RESPIRATION RATE: 20 BRPM | WEIGHT: 212.63 LBS | TEMPERATURE: 98 F | OXYGEN SATURATION: 100 %

## 2022-11-09 DIAGNOSIS — E78.2 MIXED HYPERLIPIDEMIA: Chronic | ICD-10-CM

## 2022-11-09 DIAGNOSIS — I10 ESSENTIAL HYPERTENSION: Chronic | ICD-10-CM

## 2022-11-09 DIAGNOSIS — E11.65 TYPE 2 DIABETES MELLITUS WITH HYPERGLYCEMIA, WITHOUT LONG-TERM CURRENT USE OF INSULIN: Chronic | ICD-10-CM

## 2022-11-09 DIAGNOSIS — G44.059 SUNCT (SHORT UNILATERAL NEURALGIFORM HEADACHE, CONJUNCTIVAL INJ/TEAR): Primary | Chronic | ICD-10-CM

## 2022-11-09 DIAGNOSIS — E66.09 CLASS 2 OBESITY DUE TO EXCESS CALORIES WITHOUT SERIOUS COMORBIDITY WITH BODY MASS INDEX (BMI) OF 36.0 TO 36.9 IN ADULT: Chronic | ICD-10-CM

## 2022-11-09 DIAGNOSIS — R74.8 ELEVATED LIVER ENZYMES: ICD-10-CM

## 2022-11-09 PROCEDURE — 99204 PR OFFICE/OUTPT VISIT, NEW, LEVL IV, 45-59 MIN: ICD-10-PCS | Mod: S$PBB,,, | Performed by: PSYCHIATRY & NEUROLOGY

## 2022-11-09 PROCEDURE — 99214 OFFICE O/P EST MOD 30 MIN: CPT | Mod: PBBFAC | Performed by: PSYCHIATRY & NEUROLOGY

## 2022-11-09 PROCEDURE — 99204 OFFICE O/P NEW MOD 45 MIN: CPT | Mod: S$PBB,,, | Performed by: PSYCHIATRY & NEUROLOGY

## 2022-11-09 RX ORDER — LAMOTRIGINE 50 MG/1
1 TABLET, EXTENDED RELEASE ORAL DAILY
Qty: 30 TABLET | Refills: 4 | Status: SHIPPED | OUTPATIENT
Start: 2022-11-09 | End: 2023-02-13

## 2022-11-09 RX ORDER — MULTIVITAMIN
1 TABLET ORAL DAILY
COMMUNITY

## 2022-11-11 ENCOUNTER — CLINICAL SUPPORT (OUTPATIENT)
Dept: INTERNAL MEDICINE | Facility: CLINIC | Age: 52
End: 2022-11-11

## 2022-11-11 ENCOUNTER — LAB VISIT (OUTPATIENT)
Dept: LAB | Facility: HOSPITAL | Age: 52
End: 2022-11-11
Attending: NURSE PRACTITIONER

## 2022-11-11 VITALS — TEMPERATURE: 98 F | WEIGHT: 212.5 LBS | BODY MASS INDEX: 37.65 KG/M2 | HEIGHT: 63 IN

## 2022-11-11 DIAGNOSIS — Z23 IMMUNIZATION DUE: Primary | ICD-10-CM

## 2022-11-11 DIAGNOSIS — E66.09 CLASS 2 OBESITY DUE TO EXCESS CALORIES WITHOUT SERIOUS COMORBIDITY WITH BODY MASS INDEX (BMI) OF 36.0 TO 36.9 IN ADULT: Chronic | ICD-10-CM

## 2022-11-11 DIAGNOSIS — E11.9 TYPE 2 DIABETES MELLITUS WITHOUT COMPLICATION, WITHOUT LONG-TERM CURRENT USE OF INSULIN: ICD-10-CM

## 2022-11-11 DIAGNOSIS — D72.818 OTHER DECREASED WHITE BLOOD CELL (WBC) COUNT: ICD-10-CM

## 2022-11-11 DIAGNOSIS — E78.2 MIXED HYPERLIPIDEMIA: Chronic | ICD-10-CM

## 2022-11-11 LAB
ALBUMIN SERPL-MCNC: 4.3 GM/DL (ref 3.5–5)
ALBUMIN/GLOB SERPL: 1 RATIO (ref 1.1–2)
ALP SERPL-CCNC: 72 UNIT/L (ref 40–150)
ALT SERPL-CCNC: 34 UNIT/L (ref 0–55)
AST SERPL-CCNC: 29 UNIT/L (ref 5–34)
BASOPHILS # BLD AUTO: 0.03 X10(3)/MCL (ref 0–0.2)
BASOPHILS NFR BLD AUTO: 1 %
BILIRUBIN DIRECT+TOT PNL SERPL-MCNC: 0.5 MG/DL
BUN SERPL-MCNC: 16.2 MG/DL (ref 9.8–20.1)
CALCIUM SERPL-MCNC: 10.7 MG/DL (ref 8.4–10.2)
CHLORIDE SERPL-SCNC: 99 MMOL/L (ref 98–107)
CHOLEST SERPL-MCNC: 275 MG/DL
CHOLEST/HDLC SERPL: 4 {RATIO} (ref 0–5)
CO2 SERPL-SCNC: 31 MMOL/L (ref 22–29)
CREAT SERPL-MCNC: 1.22 MG/DL (ref 0.55–1.02)
DEPRECATED CALCIDIOL+CALCIFEROL SERPL-MC: 47.7 NG/ML (ref 30–80)
EOSINOPHIL # BLD AUTO: 0.18 X10(3)/MCL (ref 0–0.9)
EOSINOPHIL NFR BLD AUTO: 5.8 %
ERYTHROCYTE [DISTWIDTH] IN BLOOD BY AUTOMATED COUNT: 12.8 % (ref 11.5–17)
EST. AVERAGE GLUCOSE BLD GHB EST-MCNC: 185.8 MG/DL
GFR SERPLBLD CREATININE-BSD FMLA CKD-EPI: 53 MLS/MIN/1.73/M2
GLOBULIN SER-MCNC: 4.1 GM/DL (ref 2.4–3.5)
GLUCOSE SERPL-MCNC: 123 MG/DL (ref 74–100)
HBA1C MFR BLD: 8.1 %
HCT VFR BLD AUTO: 47 % (ref 37–47)
HDLC SERPL-MCNC: 68 MG/DL (ref 35–60)
HGB BLD-MCNC: 15.2 GM/DL (ref 12–16)
IMM GRANULOCYTES # BLD AUTO: 0 X10(3)/MCL (ref 0–0.04)
IMM GRANULOCYTES NFR BLD AUTO: 0 %
LDLC SERPL CALC-MCNC: 178 MG/DL (ref 50–140)
LYMPHOCYTES # BLD AUTO: 1.15 X10(3)/MCL (ref 0.6–4.6)
LYMPHOCYTES NFR BLD AUTO: 37.1 %
MCH RBC QN AUTO: 29.4 PG (ref 27–31)
MCHC RBC AUTO-ENTMCNC: 32.3 MG/DL (ref 33–36)
MCV RBC AUTO: 90.9 FL (ref 80–94)
MONOCYTES # BLD AUTO: 0.17 X10(3)/MCL (ref 0.1–1.3)
MONOCYTES NFR BLD AUTO: 5.5 %
NEUTROPHILS # BLD AUTO: 1.6 X10(3)/MCL (ref 2.1–9.2)
NEUTROPHILS NFR BLD AUTO: 50.6 %
NRBC BLD AUTO-RTO: 0 %
PLATELET # BLD AUTO: 190 X10(3)/MCL (ref 130–400)
PMV BLD AUTO: 10.4 FL (ref 7.4–10.4)
POTASSIUM SERPL-SCNC: 4.3 MMOL/L (ref 3.5–5.1)
PROT SERPL-MCNC: 8.4 GM/DL (ref 6.4–8.3)
RBC # BLD AUTO: 5.17 X10(6)/MCL (ref 4.2–5.4)
SODIUM SERPL-SCNC: 140 MMOL/L (ref 136–145)
TRIGL SERPL-MCNC: 144 MG/DL (ref 37–140)
VLDLC SERPL CALC-MCNC: 29 MG/DL
WBC # SPEC AUTO: 3.1 X10(3)/MCL (ref 4.5–11.5)

## 2022-11-11 PROCEDURE — 80053 COMPREHEN METABOLIC PANEL: CPT

## 2022-11-11 PROCEDURE — 90471 IMMUNIZATION ADMIN: CPT | Mod: PBBFAC

## 2022-11-11 PROCEDURE — 85025 COMPLETE CBC W/AUTO DIFF WBC: CPT

## 2022-11-11 PROCEDURE — 99212 OFFICE O/P EST SF 10 MIN: CPT | Mod: PBBFAC

## 2022-11-11 PROCEDURE — 80061 LIPID PANEL: CPT

## 2022-11-11 PROCEDURE — 83036 HEMOGLOBIN GLYCOSYLATED A1C: CPT

## 2022-11-11 PROCEDURE — 36415 COLL VENOUS BLD VENIPUNCTURE: CPT

## 2022-11-11 PROCEDURE — 82306 VITAMIN D 25 HYDROXY: CPT

## 2022-11-11 NOTE — PROGRESS NOTES
Patient presented to Vaccine Clinic for annual Influenza Vaccine.  Influenza-Quadrivalent-PF*Preferred* (6 months and older 0.5 ml administered IM to right deltoid using aseptic technique. Patient tolerated procedure well with no adverse reactions noted. Patient reviewed and given Influenza  VIS sheet.

## 2022-12-05 ENCOUNTER — OFFICE VISIT (OUTPATIENT)
Dept: INTERNAL MEDICINE | Facility: CLINIC | Age: 52
End: 2022-12-05

## 2022-12-05 VITALS
SYSTOLIC BLOOD PRESSURE: 118 MMHG | TEMPERATURE: 98 F | HEIGHT: 63 IN | BODY MASS INDEX: 38.52 KG/M2 | WEIGHT: 217.38 LBS | HEART RATE: 73 BPM | DIASTOLIC BLOOD PRESSURE: 78 MMHG | RESPIRATION RATE: 18 BRPM

## 2022-12-05 DIAGNOSIS — E11.65 TYPE 2 DIABETES MELLITUS WITH HYPERGLYCEMIA, WITHOUT LONG-TERM CURRENT USE OF INSULIN: Chronic | ICD-10-CM

## 2022-12-05 DIAGNOSIS — I10 ESSENTIAL HYPERTENSION: Chronic | ICD-10-CM

## 2022-12-05 DIAGNOSIS — Z00.00 WELLNESS EXAMINATION: Primary | ICD-10-CM

## 2022-12-05 DIAGNOSIS — E03.9 HYPOTHYROIDISM (ACQUIRED): Chronic | ICD-10-CM

## 2022-12-05 DIAGNOSIS — R74.8 ELEVATED LIVER ENZYMES: ICD-10-CM

## 2022-12-05 DIAGNOSIS — G44.059 SUNCT (SHORT UNILATERAL NEURALGIFORM HEADACHE, CONJUNCTIVAL INJ/TEAR): Chronic | ICD-10-CM

## 2022-12-05 DIAGNOSIS — E78.2 MIXED HYPERLIPIDEMIA: Chronic | ICD-10-CM

## 2022-12-05 DIAGNOSIS — E66.09 CLASS 2 OBESITY DUE TO EXCESS CALORIES WITHOUT SERIOUS COMORBIDITY WITH BODY MASS INDEX (BMI) OF 38.0 TO 38.9 IN ADULT: Chronic | ICD-10-CM

## 2022-12-05 PROCEDURE — 99396 PR PREVENTIVE VISIT,EST,40-64: ICD-10-PCS | Mod: S$PBB,,, | Performed by: NURSE PRACTITIONER

## 2022-12-05 PROCEDURE — 99214 OFFICE O/P EST MOD 30 MIN: CPT | Mod: PBBFAC | Performed by: NURSE PRACTITIONER

## 2022-12-05 PROCEDURE — 99214 PR OFFICE/OUTPT VISIT, EST, LEVL IV, 30-39 MIN: ICD-10-PCS | Mod: S$PBB,25,, | Performed by: NURSE PRACTITIONER

## 2022-12-05 PROCEDURE — 99396 PREV VISIT EST AGE 40-64: CPT | Mod: S$PBB,,, | Performed by: NURSE PRACTITIONER

## 2022-12-05 PROCEDURE — 99214 OFFICE O/P EST MOD 30 MIN: CPT | Mod: S$PBB,25,, | Performed by: NURSE PRACTITIONER

## 2022-12-05 RX ORDER — PIOGLITAZONEHYDROCHLORIDE 15 MG/1
30 TABLET ORAL DAILY
Qty: 60 TABLET | Refills: 4 | Status: SHIPPED | OUTPATIENT
Start: 2022-12-05 | End: 2023-05-03

## 2022-12-05 RX ORDER — GLIMEPIRIDE 4 MG/1
6 TABLET ORAL
Qty: 45 TABLET | Refills: 4 | Status: SHIPPED | OUTPATIENT
Start: 2022-12-05 | End: 2023-05-03 | Stop reason: SDUPTHER

## 2022-12-05 RX ORDER — LEVOTHYROXINE SODIUM 100 UG/1
100 TABLET ORAL DAILY
Qty: 30 TABLET | Refills: 4 | Status: SHIPPED | OUTPATIENT
Start: 2022-12-05 | End: 2023-05-03 | Stop reason: SDUPTHER

## 2022-12-05 RX ORDER — AMLODIPINE BESYLATE 10 MG/1
10 TABLET ORAL DAILY
Qty: 30 TABLET | Refills: 4 | Status: SHIPPED | OUTPATIENT
Start: 2022-12-05 | End: 2023-05-03 | Stop reason: SDUPTHER

## 2022-12-05 RX ORDER — LISINOPRIL 20 MG/1
20 TABLET ORAL DAILY
Qty: 30 TABLET | Refills: 4 | Status: SHIPPED | OUTPATIENT
Start: 2022-12-05 | End: 2023-05-03 | Stop reason: SDUPTHER

## 2022-12-05 RX ORDER — METFORMIN HYDROCHLORIDE 1000 MG/1
1000 TABLET ORAL 2 TIMES DAILY
Qty: 60 TABLET | Refills: 4 | Status: SHIPPED | OUTPATIENT
Start: 2022-12-05 | End: 2023-05-03 | Stop reason: SDUPTHER

## 2022-12-05 RX ORDER — GABAPENTIN 100 MG/1
100 CAPSULE ORAL 2 TIMES DAILY
Qty: 60 CAPSULE | Refills: 4 | Status: SHIPPED | OUTPATIENT
Start: 2022-12-05 | End: 2023-05-30 | Stop reason: SDUPTHER

## 2022-12-05 RX ORDER — ATORVASTATIN CALCIUM 40 MG/1
40 TABLET, FILM COATED ORAL DAILY
Qty: 30 TABLET | Refills: 4 | Status: SHIPPED | OUTPATIENT
Start: 2022-12-05 | End: 2023-05-03 | Stop reason: SDUPTHER

## 2022-12-05 NOTE — PROGRESS NOTES
Subjective:       Patient ID: Scotty Moctezuma is a 52 y.o. female.    Chief Complaint: Follow-up and Results    Patient has diagnosis of HTN, HLD, DM2, Hypothyroidism, SUNCT Headaches, Obesity. Patient seen in clinic today for wellness visit. Patient last seen in clinic on 07/06/2022. Today, patient states doing well, denies any acute complaints. Patient states she was recently started on Lamictal for SUNCT headaches per Neurology and the medication is helping and she is tolerating it well. Patient was restarted on Rosuvastatin due to HLD, patient states she has been taking her medication however cholesterol levels worsened. Per medication history, Rosuvastatin has not been filled since 02/22/2022. Patient states she has trouble affording her medication so is changing pharmacies to the pharmacy here at [facility] for cheaper prices. Patient does not have medical insurance and is self pay. Patient was also started on Actos for DM2 in conjunction with Metformin and Glimepiride. HgbA1c not improved, current level is 8.1. Patient states taking medication as prescribed.  Pharmacy medication history reviewed and all medications for DM2 recently filled.  Patient states she does have low blood sugars intermittently in the mornings, averaging around 60.  Patient currently taking glimepiride 4 mg b.i.d. in albumin      Patient followed by GYN clinic. Patient has follow up appointment scheduled with GYN clinic on 06/15/2022, was a no show.      Patient followed by Neurology Clinic. Last appointment on 11/09/2022. This is 52 y.o. female with history of hypertension, hyperlipidemia, diabetes type 2, hypothyroidism, obesity, transaminitis who is referred headache disorder. left retro-orbital, sharp, stabbing, seconds, lasting days, with OS lacrimation. Unclear if there is ptosis, conjunctival injection, miosis/mydriasis. This is 52 y.o. female with history of hypertension, hyperlipidemia, diabetes type 2, hypothyroidism, obesity,  transaminitis who is referred for SUNCT headache. Plan: continue with Gabapentin 100 mg twice a day; continue with Lisinopril 20 mg once a day; stop Topiramate 100 mg once at night; start Lamotrigine ER 50 mg daily. RTC 3 months with Telemedicine.         Mammogram: 03/19/2021, benign, ordered 07/06/2022  PAP: 02/04/2021  FIT: 02/26/2021, positive  Colonoscopy: 03/18/2022, recommend repeat in 10 years  Diabetic Eye Exam: 08/2021 ([facility])  Diabetic Foot Exam: 02/18/2022  Bone Density: deferred due to age  Medicare Wellness: N/A    Review of Systems   Constitutional: Negative.    HENT: Negative.     Eyes: Negative.    Respiratory: Negative.     Cardiovascular: Negative.    Gastrointestinal: Negative.    Endocrine: Negative.    Genitourinary: Negative.    Musculoskeletal: Negative.    Integumentary:  Negative.   Allergic/Immunologic: Negative.    Neurological: Negative.    Hematological: Negative.    Psychiatric/Behavioral: Negative.         Objective:      Physical Exam  Vitals reviewed.   Constitutional:       Appearance: Normal appearance.   HENT:      Head: Normocephalic and atraumatic.      Mouth/Throat:      Mouth: Mucous membranes are moist.      Pharynx: Oropharynx is clear.   Eyes:      Extraocular Movements: Extraocular movements intact.      Conjunctiva/sclera: Conjunctivae normal.      Pupils: Pupils are equal, round, and reactive to light.   Cardiovascular:      Rate and Rhythm: Normal rate and regular rhythm.      Heart sounds: Normal heart sounds.   Pulmonary:      Effort: Pulmonary effort is normal.      Breath sounds: Normal breath sounds.   Abdominal:      General: Bowel sounds are normal.   Musculoskeletal:         General: Normal range of motion.      Cervical back: Normal range of motion.   Skin:     General: Skin is warm and dry.   Neurological:      Mental Status: She is alert and oriented to person, place, and time.   Psychiatric:         Mood and Affect: Mood normal.         Behavior:  Behavior normal.       Assessment:       Problem List Items Addressed This Visit          Neuro    SUNCT (short unilateral neuralgiform headache, conjunctival inj/tear) (Chronic)     Patient followed by Neurology  Continue medications as prescribed.            Cardiac/Vascular    Essential hypertension (Chronic)     B/P: 118/78  Continue Amlodipine to 10 mg daily, Lisinopril 20 mg daily.   DASH diet  Encouraged home blood pressure monitoring         Mixed hyperlipidemia (Chronic)     Start Atorvastatin 40 mg daily  Weight Loss encouraged.  Low fat/ high fiber diet.  Increase physical activity.     Latest Reference Range & Units 11/11/22 09:12   Cholesterol <=200 mg/dL 275 (H)   HDL 35 - 60 mg/dL 68 (H)   LDL Cholesterol External 50.00 - 140.00 mg/dL 178.00 (H)   Total Cholesterol/HDL Ratio 0 - 5  4   Triglycerides 37 - 140 mg/dL 144 (H)   Very Low Density Lipoprotein  29               Endocrine    Type 2 diabetes mellitus with hyperglycemia, without long-term current use of insulin (Chronic)     Increase Actos to 30 mg daily  Change Glimepiride to 6 mg Qam  Continue Metformin 1,000 mg bid  Considered starting Ozempic however pharmacy states medication on backorder; Trulicity too expensive for patient  Encouraged home CBG monitoring.  Hypoglycemic episodes: yes, lowest in 60s  BMI: 38.52  HgbA1c: 8.1  UA Creatinine: 72.3  UA Microalbumin: 14.8  Start Atorvastatin  Weight Loss Encouraged  ADA Diet         Relevant Medications    glimepiride (AMARYL) 4 MG tablet    pioglitazone (ACTOS) 15 MG tablet    metFORMIN (GLUCOPHAGE) 1000 MG tablet    Hypothyroidism (acquired) (Chronic)     TSH: 2.39  Continue Euthyrox 100 mcg daily         Class 2 obesity due to excess calories without serious comorbidity with body mass index (BMI) of 38.0 to 38.9 in adult (Chronic)     BMI: 38.52  TSH: 2.39  Vitamin D level: 47  HgbA1c: 8.1  Weight Loss Encouraged  Increase Physical Activity            GI    Elevated liver enzymes     Liver  enzymes improved   Latest Reference Range & Units 11/11/22 09:12   AST 5 - 34 unit/L 29   ALT 0 - 55 unit/L 34               Other    Wellness examination - Primary     Health maintenance reviewed  Labs ordered  Patient refuses vaccinations today         Relevant Orders    Hepatitis C antibody    HIV 1/2 Ag/Ab (4th Gen)       Plan:    Patient to follow up in 3 months with labs to be done prior to appointment.

## 2022-12-06 PROBLEM — E66.09 CLASS 2 OBESITY DUE TO EXCESS CALORIES WITHOUT SERIOUS COMORBIDITY WITH BODY MASS INDEX (BMI) OF 38.0 TO 38.9 IN ADULT: Chronic | Status: ACTIVE | Noted: 2022-06-06

## 2022-12-06 PROBLEM — Z00.00 WELLNESS EXAMINATION: Status: ACTIVE | Noted: 2022-12-06

## 2022-12-06 NOTE — ASSESSMENT & PLAN NOTE
Increase Actos to 30 mg daily  Change Glimepiride to 6 mg Qam  Continue Metformin 1,000 mg bid  Considered starting Ozempic however pharmacy states medication on backorder; Trulicity too expensive for patient  Encouraged home CBG monitoring.  Hypoglycemic episodes: yes, lowest in 60s  BMI: 38.52  HgbA1c: 8.1  UA Creatinine: 72.3  UA Microalbumin: 14.8  Start Atorvastatin  Weight Loss Encouraged  ADA Diet

## 2022-12-06 NOTE — ASSESSMENT & PLAN NOTE
Start Atorvastatin 40 mg daily  Weight Loss encouraged.  Low fat/ high fiber diet.  Increase physical activity.     Latest Reference Range & Units 11/11/22 09:12   Cholesterol <=200 mg/dL 275 (H)   HDL 35 - 60 mg/dL 68 (H)   LDL Cholesterol External 50.00 - 140.00 mg/dL 178.00 (H)   Total Cholesterol/HDL Ratio 0 - 5  4   Triglycerides 37 - 140 mg/dL 144 (H)   Very Low Density Lipoprotein  29

## 2022-12-06 NOTE — ASSESSMENT & PLAN NOTE
Liver enzymes improved   Latest Reference Range & Units 11/11/22 09:12   AST 5 - 34 unit/L 29   ALT 0 - 55 unit/L 34

## 2022-12-06 NOTE — ASSESSMENT & PLAN NOTE
BMI: 38.52  TSH: 2.39  Vitamin D level: 47  HgbA1c: 8.1  Weight Loss Encouraged  Increase Physical Activity

## 2022-12-07 ENCOUNTER — DOCUMENTATION ONLY (OUTPATIENT)
Dept: INTERNAL MEDICINE | Facility: CLINIC | Age: 52
End: 2022-12-07

## 2023-02-10 NOTE — PROGRESS NOTES
[facility] Neurology Follow Up Telemedicine Visit Note    Initial Visit Date: 11/9/2022  Last Visit Date: 11/9/2022  Current Visit Date:  02/13/2023    Chief Complaint:     Chief Complaint   Patient presents with    Patient presents today with a statement explaining the medi    Patient complains of frequent forgetfulness       History of Present Illness:      This is a real-time audio/video visit that was performed with the originating site at patient's home and the distant site, [facility]. Verbal consent to participate in interactive audio & video visit was obtained.    I discussed with the patient regarding the nature of our telehealth visits, that:    - Our sessions are not being recorded and that personal health information is protected  - Provider would evaluate the patient and recommend diagnostics and treatments based on my assessment  - [facility] will provide follow up care in person if/when the patient needs it.     This is 52 y.o. female with history of hypertension, hyperlipidemia, diabetes type 2, hypothyroidism, obesity, transaminitis who is referred for SUNCT headache.  During last visit, Lamictal extended release 50 mg once a day was started.  Topiramate 100 mg once at night was stopped.  Headache improved.  Now complaining of burning pain in right forehead on occasion and forgetfulness.  States that she has been sleeping well.      Age of Onset : 18 years old      Headache Description:   left retro-orbital, sharp, stabbing, seconds, lasting days, with OS lacrimation. Unclear if there is ptosis, conjunctival injection, miosis/mydriasis. - resolved  Right frontal burning sensation: Once per month.     Frequency: as above.      Provocation Factors: smells      Risk Factors  - Family history of headache disorder: Yes son, mother, and sister with headache.   - History of focal CNS lesions: No  - History of CNS infections: No  - History  head trauma: No  - History of underlying mood disorder: No  - History of sleep disorder: No   - Recreational drug use: No  - Tobacco use: No  - Alcohol use: Yes occasional  - Weight fluctuation: Not Applicable  - Isotretinoin or Tetracycline use:  Not Applicable  - Family planning and contraceptive use: Not Applicable    Medications:     Current Prophylactic  Gabapentin 100 mg twice a day   Lisinopril 20 mg once a day   Lamotrigine extended release 50 mg once a day (11/9/2022 to present)    Current Abortive  Tylenol - ineffective     Prior Prophylactic  Topiramate 100 mg once at night - ran out of refills    Prior Abortive  Ibuprofen PRN - ineffective     Devices:     - VNS:  - TNS  - TMS:     Procedures:     - Botox:  - PSG block:   - Occipital nerve block:     Labs:     Results for orders placed or performed in visit on 12/07/22    COLONOSCOPY   Result Value Ref Range    CRC Recommendation External Repeat colonoscopy in 10 years        Studies:     - MRI Brain with and without contrast August 12, 2019:  I have reviewed the study independently and with the patient.  Unremarkable.  - MRA Head w/o Myorn:   - MRV Head w/o Myron:   - NCHCT:  - Lumbar Puncture:    Review of Systems:     All other systems reviewed and are negative.    Physical Exams:     Physical Exam  Nursing note reviewed.   Constitutional:       Appearance: Normal appearance.   HENT:      Head: Normocephalic.   Pulmonary:      Effort: Pulmonary effort is normal.   Musculoskeletal:         General: Normal range of motion.      Cervical back: Normal range of motion.   Neurological:      Mental Status: She is alert.     Telemedicine Comprehensive Neurological Exam:  Mental Status: Alert Oriented to Self, Date, and Place. Comprehension wnl. No dysarthria.   CN II - XII: MARYLU, VA grossly intact, No ptosis OU, EOMI without nystagmus, Hearing grossly intact, Face Symmetric, Tongue and Uvula midline, Trapezius symmetric bilateral.   Motor: no abnormal  involuntary or voluntary movements, Fine finger movements wnl b/l, No pronator drift.   Sensory: unable to assess.  Reflexes: unable to assess.   Cerebellar: RAHM wnl b/l.  Romberg: absent.   Gait: normal.    Assessment:     This is 52 y.o. female with history of hypertension, hyperlipidemia, diabetes type 2, hypothyroidism, obesity, transaminitis who is referred for SUNCT headache, improving. Now complaining of memory loss.     Problem List Items Addressed This Visit          Neuro    SUNCT (short unilateral neuralgiform headache, conjunctival inj/tear) (Chronic)       Cardiac/Vascular    Essential hypertension - Primary (Chronic)       Endocrine    Type 2 diabetes mellitus with hyperglycemia, without long-term current use of insulin (Chronic)       GI    Elevated liver enzymes     Other Visit Diagnoses       Class 2 obesity due to excess calories without serious comorbidity with body mass index (BMI) of 36.0 to 36.9 in adult                Plan:     [] increase Lamotrigine ER to 100 mg daily   [] continue with Gabapentin 100 mg twice a day   [] continue with Lisinopril 20 mg once a day   [] Memory loss labs next week.    RTC 3 months     Headache education provided: good sleep hygiene and 7 hours of sleep per night, stress management, medication overuse education provided. Using more 3 OTC per week may worsen headaches, high intensity interval training has shown to reduce headache frequency. Low carb, high protein has shown to reduce headache frequency. Patient is instructed in keep headache diary.     I have explained the treatment plan, diagnosis, and prognosis to patient. All questions are answered to the best of my knowledge.     Face to face time 30 minutes, including documentation, chart review, counseling, education, review of test results, relevant medical records, and coordination of care.       Debbie Gallegos MD   General Neurology  02/13/2023

## 2023-02-13 ENCOUNTER — OFFICE VISIT (OUTPATIENT)
Dept: NEUROLOGY | Facility: CLINIC | Age: 53
End: 2023-02-13

## 2023-02-13 DIAGNOSIS — R41.3 MEMORY LOSS: ICD-10-CM

## 2023-02-13 DIAGNOSIS — G44.059 SUNCT (SHORT UNILATERAL NEURALGIFORM HEADACHE, CONJUNCTIVAL INJ/TEAR): Primary | ICD-10-CM

## 2023-02-13 DIAGNOSIS — E66.09 CLASS 2 OBESITY DUE TO EXCESS CALORIES WITHOUT SERIOUS COMORBIDITY WITH BODY MASS INDEX (BMI) OF 36.0 TO 36.9 IN ADULT: ICD-10-CM

## 2023-02-13 DIAGNOSIS — E78.2 MIXED HYPERLIPIDEMIA: Chronic | ICD-10-CM

## 2023-02-13 DIAGNOSIS — I10 ESSENTIAL HYPERTENSION: ICD-10-CM

## 2023-02-13 DIAGNOSIS — E11.65 TYPE 2 DIABETES MELLITUS WITH HYPERGLYCEMIA, WITHOUT LONG-TERM CURRENT USE OF INSULIN: ICD-10-CM

## 2023-02-13 DIAGNOSIS — R74.8 ELEVATED LIVER ENZYMES: ICD-10-CM

## 2023-02-13 PROCEDURE — 99214 PR OFFICE/OUTPT VISIT, EST, LEVL IV, 30-39 MIN: ICD-10-PCS | Mod: 95,,, | Performed by: PSYCHIATRY & NEUROLOGY

## 2023-02-13 PROCEDURE — 99214 OFFICE O/P EST MOD 30 MIN: CPT | Mod: 95,,, | Performed by: PSYCHIATRY & NEUROLOGY

## 2023-02-13 RX ORDER — LAMOTRIGINE 100 MG/1
100 TABLET, EXTENDED RELEASE ORAL DAILY
Qty: 30 TABLET | Refills: 4 | Status: SHIPPED | OUTPATIENT
Start: 2023-02-13 | End: 2023-05-15

## 2023-02-20 ENCOUNTER — PATIENT OUTREACH (OUTPATIENT)
Dept: ADMINISTRATIVE | Facility: HOSPITAL | Age: 53
End: 2023-02-20

## 2023-03-13 PROBLEM — Z00.00 WELLNESS EXAMINATION: Status: RESOLVED | Noted: 2022-12-06 | Resolved: 2023-03-13

## 2023-05-01 ENCOUNTER — LAB VISIT (OUTPATIENT)
Dept: LAB | Facility: HOSPITAL | Age: 53
End: 2023-05-01
Attending: NURSE PRACTITIONER

## 2023-05-01 DIAGNOSIS — R74.8 ELEVATED LIVER ENZYMES: ICD-10-CM

## 2023-05-01 DIAGNOSIS — I10 ESSENTIAL HYPERTENSION: ICD-10-CM

## 2023-05-01 DIAGNOSIS — E11.65 TYPE 2 DIABETES MELLITUS WITH HYPERGLYCEMIA, WITHOUT LONG-TERM CURRENT USE OF INSULIN: ICD-10-CM

## 2023-05-01 DIAGNOSIS — Z00.00 WELLNESS EXAMINATION: ICD-10-CM

## 2023-05-01 DIAGNOSIS — E78.2 MIXED HYPERLIPIDEMIA: ICD-10-CM

## 2023-05-01 DIAGNOSIS — E66.09 CLASS 2 OBESITY DUE TO EXCESS CALORIES WITHOUT SERIOUS COMORBIDITY WITH BODY MASS INDEX (BMI) OF 36.0 TO 36.9 IN ADULT: ICD-10-CM

## 2023-05-01 DIAGNOSIS — G44.059 SUNCT (SHORT UNILATERAL NEURALGIFORM HEADACHE, CONJUNCTIVAL INJ/TEAR): ICD-10-CM

## 2023-05-01 LAB
ALBUMIN SERPL-MCNC: 4 G/DL (ref 3.5–5)
ALBUMIN/GLOB SERPL: 1.2 RATIO (ref 1.1–2)
ALP SERPL-CCNC: 64 UNIT/L (ref 40–150)
ALT SERPL-CCNC: 28 UNIT/L (ref 0–55)
AST SERPL-CCNC: 25 UNIT/L (ref 5–34)
BASOPHILS # BLD AUTO: 0.02 X10(3)/MCL (ref 0–0.2)
BASOPHILS NFR BLD AUTO: 0.6 %
BILIRUBIN DIRECT+TOT PNL SERPL-MCNC: 0.4 MG/DL
BUN SERPL-MCNC: 18.5 MG/DL (ref 9.8–20.1)
CALCIUM SERPL-MCNC: 10 MG/DL (ref 8.4–10.2)
CHLORIDE SERPL-SCNC: 106 MMOL/L (ref 98–107)
CO2 SERPL-SCNC: 26 MMOL/L (ref 22–29)
CREAT SERPL-MCNC: 1 MG/DL (ref 0.55–1.02)
EOSINOPHIL # BLD AUTO: 0.14 X10(3)/MCL (ref 0–0.9)
EOSINOPHIL NFR BLD AUTO: 4.5 %
ERYTHROCYTE [DISTWIDTH] IN BLOOD BY AUTOMATED COUNT: 13.6 % (ref 11.5–17)
FOLATE SERPL-MCNC: 15.4 NG/ML (ref 7–31.4)
GFR SERPLBLD CREATININE-BSD FMLA CKD-EPI: >60 MLS/MIN/1.73/M2
GLOBULIN SER-MCNC: 3.3 GM/DL (ref 2.4–3.5)
GLUCOSE SERPL-MCNC: 152 MG/DL (ref 74–100)
HCT VFR BLD AUTO: 42.1 % (ref 37–47)
HCV AB SERPL QL IA: NONREACTIVE
HGB BLD-MCNC: 13.7 G/DL (ref 12–16)
HIV 1+2 AB+HIV1 P24 AG SERPL QL IA: NONREACTIVE
IMM GRANULOCYTES # BLD AUTO: 0.01 X10(3)/MCL (ref 0–0.04)
IMM GRANULOCYTES NFR BLD AUTO: 0.3 %
LYMPHOCYTES # BLD AUTO: 1.26 X10(3)/MCL (ref 0.6–4.6)
LYMPHOCYTES NFR BLD AUTO: 40.6 %
MCH RBC QN AUTO: 29.8 PG (ref 27–31)
MCHC RBC AUTO-ENTMCNC: 32.5 G/DL (ref 33–36)
MCV RBC AUTO: 91.7 FL (ref 80–94)
MONOCYTES # BLD AUTO: 0.15 X10(3)/MCL (ref 0.1–1.3)
MONOCYTES NFR BLD AUTO: 4.8 %
NEUTROPHILS # BLD AUTO: 1.52 X10(3)/MCL (ref 2.1–9.2)
NEUTROPHILS NFR BLD AUTO: 49.2 %
NRBC BLD AUTO-RTO: 0 %
PLATELET # BLD AUTO: 163 X10(3)/MCL (ref 130–400)
PMV BLD AUTO: 10.7 FL (ref 7.4–10.4)
POTASSIUM SERPL-SCNC: 4.2 MMOL/L (ref 3.5–5.1)
PROT SERPL-MCNC: 7.3 GM/DL (ref 6.4–8.3)
RBC # BLD AUTO: 4.59 X10(6)/MCL (ref 4.2–5.4)
SODIUM SERPL-SCNC: 138 MMOL/L (ref 136–145)
T PALLIDUM AB SER QL: NONREACTIVE
T4 FREE SERPL-MCNC: 1.16 NG/DL (ref 0.7–1.48)
TSH SERPL-ACNC: 1.78 UIU/ML (ref 0.35–4.94)
VIT B12 SERPL-MCNC: 566 PG/ML (ref 213–816)
WBC # SPEC AUTO: 3.1 X10(3)/MCL (ref 4.5–11.5)

## 2023-05-01 PROCEDURE — 85025 COMPLETE CBC W/AUTO DIFF WBC: CPT

## 2023-05-01 PROCEDURE — 80053 COMPREHEN METABOLIC PANEL: CPT

## 2023-05-01 PROCEDURE — 84439 ASSAY OF FREE THYROXINE: CPT

## 2023-05-01 PROCEDURE — 86803 HEPATITIS C AB TEST: CPT

## 2023-05-01 PROCEDURE — 36415 COLL VENOUS BLD VENIPUNCTURE: CPT

## 2023-05-01 PROCEDURE — 87389 HIV-1 AG W/HIV-1&-2 AB AG IA: CPT

## 2023-05-01 PROCEDURE — 86780 TREPONEMA PALLIDUM: CPT

## 2023-05-01 PROCEDURE — 82746 ASSAY OF FOLIC ACID SERUM: CPT

## 2023-05-01 PROCEDURE — 82607 VITAMIN B-12: CPT

## 2023-05-01 PROCEDURE — 84443 ASSAY THYROID STIM HORMONE: CPT

## 2023-05-03 ENCOUNTER — OFFICE VISIT (OUTPATIENT)
Dept: INTERNAL MEDICINE | Facility: CLINIC | Age: 53
End: 2023-05-03

## 2023-05-03 DIAGNOSIS — Z74.8 ASSISTANCE NEEDED WITH TRANSPORTATION: ICD-10-CM

## 2023-05-03 DIAGNOSIS — E11.65 TYPE 2 DIABETES MELLITUS WITH HYPERGLYCEMIA, WITHOUT LONG-TERM CURRENT USE OF INSULIN: Primary | Chronic | ICD-10-CM

## 2023-05-03 DIAGNOSIS — E03.9 HYPOTHYROIDISM (ACQUIRED): Chronic | ICD-10-CM

## 2023-05-03 DIAGNOSIS — Z59.89 DOES NOT HAVE HEALTH INSURANCE: ICD-10-CM

## 2023-05-03 PROCEDURE — 99214 OFFICE O/P EST MOD 30 MIN: CPT | Mod: 95,,, | Performed by: NURSE PRACTITIONER

## 2023-05-03 PROCEDURE — 99214 PR OFFICE/OUTPT VISIT, EST, LEVL IV, 30-39 MIN: ICD-10-PCS | Mod: 95,,, | Performed by: NURSE PRACTITIONER

## 2023-05-03 RX ORDER — ATORVASTATIN CALCIUM 40 MG/1
40 TABLET, FILM COATED ORAL DAILY
Qty: 90 TABLET | Refills: 1 | Status: SHIPPED | OUTPATIENT
Start: 2023-05-03 | End: 2023-11-16 | Stop reason: SDUPTHER

## 2023-05-03 RX ORDER — AMLODIPINE BESYLATE 10 MG/1
10 TABLET ORAL DAILY
Qty: 90 TABLET | Refills: 1 | Status: SHIPPED | OUTPATIENT
Start: 2023-05-03 | End: 2023-11-16 | Stop reason: SDUPTHER

## 2023-05-03 RX ORDER — METFORMIN HYDROCHLORIDE 1000 MG/1
1000 TABLET ORAL 2 TIMES DAILY
Qty: 180 TABLET | Refills: 1 | Status: SHIPPED | OUTPATIENT
Start: 2023-05-03 | End: 2023-11-16 | Stop reason: SDUPTHER

## 2023-05-03 RX ORDER — LEVOTHYROXINE SODIUM 100 UG/1
100 TABLET ORAL DAILY
Qty: 90 TABLET | Refills: 1 | Status: SHIPPED | OUTPATIENT
Start: 2023-05-03 | End: 2023-11-16 | Stop reason: SDUPTHER

## 2023-05-03 RX ORDER — PIOGLITAZONEHYDROCHLORIDE 30 MG/1
30 TABLET ORAL DAILY
Qty: 90 TABLET | Refills: 1 | Status: SHIPPED | OUTPATIENT
Start: 2023-05-03 | End: 2023-11-16 | Stop reason: SDUPTHER

## 2023-05-03 RX ORDER — LISINOPRIL 20 MG/1
20 TABLET ORAL DAILY
Qty: 90 TABLET | Refills: 1 | Status: SHIPPED | OUTPATIENT
Start: 2023-05-03 | End: 2023-11-16 | Stop reason: SDUPTHER

## 2023-05-03 RX ORDER — GLIMEPIRIDE 4 MG/1
6 TABLET ORAL
Qty: 135 TABLET | Refills: 1 | Status: SHIPPED | OUTPATIENT
Start: 2023-05-03 | End: 2023-11-16 | Stop reason: SDUPTHER

## 2023-05-03 SDOH — SOCIAL DETERMINANTS OF HEALTH (SDOH): OTHER PROBLEMS RELATED TO HOUSING AND ECONOMIC CIRCUMSTANCES: Z59.89

## 2023-05-03 NOTE — PROGRESS NOTES
Patient ID: [id number]     Chief Complaint: Results (States black garcía has moved top of toe.) and Follow-up    HPI:   The patient location is: home  The chief complaint leading to consultation is: follow up DM2    Visit type: audiovisual    Face to Face time with patient: 20  25 minutes of total time spent on the encounter, which includes face to face time and non-face to face time preparing to see the patient (eg, review of tests), Obtaining and/or reviewing separately obtained history, Documenting clinical information in the electronic or other health record, Independently interpreting results (not separately reported) and communicating results to the patient/family/caregiver, or Care coordination (not separately reported).   Each patient to whom he or she provides medical services by telemedicine is:  (1) informed of the relationship between the physician and patient and the respective role of any other health care provider with respect to management of the patient; and (2) notified that he or she may decline to receive medical services by telemedicine and may withdraw from such care at any time.    Scotty Moctezuma is a 52 y.o. female with diagnosis of HTN, HLD, DM2, Hypothyroidism, SUNCT Headaches, Obesity. Patient seen per virtual visit today for follow up on DM2. Patient last seen in clinic on 12/05/2022.  Today, patient states doing well, taking medications as prescribed. Patient states checking CBGs at home, averages 150's. Patient states she does have low CBGs at night at times, lowest 65. Patient instructed on healthy snack at bedtime. Patient currently prescribed Glimepiride 6 mg Qam, Metformin 1,000 mg bid, Actos 30 mg daily. Patient does not have health insurance and is without transportation so is limited on the medication she can purchase.   Patient currently prescribed Atorvastatin for HLD. States taking medications as prescribed, tolerating well.     Patient followed by Neurology Clinic. Last  appointment on 2023. This is 52 y.o. female with history of hypertension, hyperlipidemia, diabetes type 2, hypothyroidism, obesity, transaminitis who is referred for SUNCT headache. During last visit, Lamictal extended release 50 mg once a day was started. Topiramate 100 mg once at night was stopped. Headache improved. Now complaining of burning pain in right forehead on occasion and forgetfulness. States that she has been sleeping well. Dx: SUNCT. Plan: Increase Lamictal to 100 mg daily; continue with Gabapentin 100 mg bid and Lisinopril 20 mg daily; memory loss labs next week. Patient has follow up appointment scheduled for 05/15/2023.       Patient followed by GYN clinic. Patient has follow up appointment scheduled with GYN clinic on 06/15/2022, was a no show.     Review of patient's allergies indicates:  No Known Allergies    Breast Cancer Screening: MMG benign on 2021; ordered 2022  Cervical Cancer Screenin2021  Colorectal Cancer Screening: Colonoscopy on 2022 with recommended repeat in 10 years  Diabetic Eye Exam: 2021, followed by [facility]  Diabetic Foot Exam: 2022  Lung Cancer Screening: N/A  Prostate Cancer Screening: N/A  AAA Screening: N/A  Osteoporosis Screening: deferred due to age  Medicare Wellness: N/A  Immunizations:   Immunization History   Administered Date(s) Administered    COVID-19 Vaccine 2022    Influenza - Quadrivalent - PF *Preferred* (6 months and older) 2021, 2022, 2022    Tdap 2020    Zoster 2022     Past Surgical History:   Procedure Laterality Date     SECTION      CHOLECYSTECTOMY       family history includes Diabetes Mellitus in her father; Heart failure in her mother.    Social History     Socioeconomic History    Marital status:     Number of children: 3   Tobacco Use    Smoking status: Never    Smokeless tobacco: Never   Substance and Sexual Activity    Alcohol use: Yes     Comment:  Occasionally    Drug use: Never    Sexual activity: Yes     Partners: Male   Social History Narrative    ** Merged History Encounter **          Current Outpatient Medications   Medication Instructions    amLODIPine (NORVASC) 10 mg, Oral, Daily    atorvastatin (LIPITOR) 40 mg, Oral, Daily    cholecalciferol, vitamin D3, 1,250 mcg (50,000 unit) capsule Oral    EUTHYROX 100 mcg, Oral, Daily    gabapentin (NEURONTIN) 100 mg, Oral, 2 times daily    glimepiride (AMARYL) 6 mg, Oral, With breakfast    lamotrigine XR (LAMICTAL XR) 100 mg, Oral, Daily    lisinopriL (PRINIVIL,ZESTRIL) 20 mg, Oral, Daily    metFORMIN (GLUCOPHAGE) 1,000 mg, Oral, 2 times daily    multivitamin (THERAGRAN) per tablet 1 tablet, Oral, Daily, For women over 50 years    pioglitazone (ACTOS) 30 mg, Oral, Daily       Subjective:     Review of Systems   Constitutional: Negative.  Negative for fatigue.   HENT: Negative.     Eyes: Negative.    Respiratory: Negative.     Cardiovascular: Negative.  Negative for chest pain.   Gastrointestinal: Negative.    Endocrine: Negative.  Negative for polydipsia, polyphagia and polyuria.   Genitourinary: Negative.    Musculoskeletal: Negative.    Skin: Negative.  Negative for pallor.   Allergic/Immunologic: Negative.    Neurological: Negative.  Negative for dizziness, tremors, seizures, speech difficulty, weakness and headaches.   Hematological: Negative.    Psychiatric/Behavioral: Negative.  Negative for confusion. The patient is not nervous/anxious.      Objective:     Visit Vitals  LMP 10/30/2020 (Exact Date)     Physical Exam  Neurological:      Mental Status: She is alert and oriented to person, place, and time.   Psychiatric:         Mood and Affect: Mood normal.       Labs Reviewed:     Hematology:  Lab Results   Component Value Date    WBC 3.1 (L) 05/01/2023    HGB 13.7 05/01/2023    HCT 42.1 05/01/2023     05/01/2023     Chemistry:  Lab Results   Component Value Date     05/01/2023    K 4.2  05/01/2023    CHLORIDE 106 05/01/2023    BUN 18.5 05/01/2023    CREATININE 1.00 05/01/2023    EGFRNORACEVR >60 05/01/2023    GLUCOSE 152 (H) 05/01/2023    CALCIUM 10.0 05/01/2023    ALKPHOS 64 05/01/2023    LABPROT 7.3 05/01/2023    ALBUMIN 4.0 05/01/2023    BILIDIR 0.1 12/03/2021    IBILI 0.30 12/03/2021    AST 25 05/01/2023    ALT 28 05/01/2023    MG 2.1 11/04/2019    PHOS 3.7 08/12/2019    [id number] 47.7 11/11/2022      Lab Results   Component Value Date    HGBA1C 8.1 (H) 11/11/2022      Lipid Panel:  Lab Results   Component Value Date    CHOL 275 (H) 11/11/2022    HDL 68 (H) 11/11/2022    .00 (H) 11/11/2022    TRIG 144 (H) 11/11/2022    TOTALCHOLEST 4 11/11/2022      Thyroid:  Lab Results   Component Value Date    TSH 1.782 05/01/2023    T3FREE 1.96 (L) 03/03/2020      Urine:  Lab Results   Component Value Date    COLORUA Light-Yellow (A) 07/01/2022    APPEARANCEUA Clear 07/01/2022    SGUA 1.012 07/01/2022    PHUA 6.0 07/01/2022    PROTEINUA Negative 07/01/2022    GLUCOSEUA Normal 07/01/2022    KETONESUA Negative 07/01/2022    BLOODUA Negative 07/01/2022    NITRITESUA Negative 07/01/2022    LEUKOCYTESUR Negative 07/01/2022    RBCUA 0-5 07/01/2022    WBCUA 0-5 07/01/2022    BACTERIA Occ (A) 07/01/2022    SQEPUA Few (A) 07/01/2022    HYALINECASTS None Seen 07/01/2022    CREATRANDUR 72.3 07/01/2022        Assessment:       ICD-10-CM ICD-9-CM   1. Type 2 diabetes mellitus with hyperglycemia, without long-term current use of insulin  E11.65 250.00     790.29   2. Hypothyroidism (acquired)  E03.9 244.9   3. Does not have health insurance  Z59.89 V60.89   4. Assistance needed with transportation  Z74.8 V60.89        Plan:     1. Type 2 diabetes mellitus with hyperglycemia, without long-term current use of insulin  Continue Glimepiride 6 mg Qam, Metformin 1,000 mg bid, Actos 30 mg daily  Encouraged home CBG monitoring.  Hypoglycemic episodes: yes  Educated on healthy snack at bedtime  There is no height or  weight on file to calculate BMI.  Hemoglobin A1c   Date Value Ref Range Status   11/11/2022 8.1 (H) <=7.0 % Final     Urine Creatinine   Date Value Ref Range Status   07/01/2022 72.3 47.0 - 110.0 mg/dL Final   Urine Microalbumin: 14.8  On Atoravstatin  On Lisinopril  Weight Loss Encouraged  ADA Diet    2. Hypothyroidism (acquired)  TSH level: 1.7  Continue Levothyroxine 100 mcg daily    3. Does not have health insurance  - Ambulatory referral/consult to Outpatient Case Management    4. Assistance needed with transportation  - Ambulatory referral/consult to Outpatient Case Management      Follow up in about 3 months (around 8/3/2023) for Labs. In addition to their scheduled follow up, the patient has also been instructed to follow up on as needed basis.     Catrina Quiroz, BOOGIE

## 2023-05-11 ENCOUNTER — PATIENT OUTREACH (OUTPATIENT)
Dept: ADMINISTRATIVE | Facility: OTHER | Age: 53
End: 2023-05-11

## 2023-05-11 NOTE — PROGRESS NOTES
CHW - Initial Contact    This Community Health Worker completed the Social Determinant of Health questionnaire with patient via telephone today.    Pt identified barriers of most importance are: Food assistance    Referrals to community agencies completed with patient/caregiver consent outside of [facility] include: yes  Referrals were put through [facility] - yes:   Support and Services: José Miguel stated she walks tot he store for food and her daughter has to assist them with food with her food stamps. She doesn't have insurance at this time, her  gets disability. She has wanted to apply for SNAP and medicaid ans was told her and  do not meet the income limits. CHW informed her of the income limits for medicaid. Mrs. Moctezuma  is over 70 and CHW called [facility] and was able to leave a message for a return call to Mrs. Moctezuma for meals on wheels. Mrs. Moctezuma stated she currently doesn't have any income due to staying home with her  who os disable. They had an home aid before covid and since covid her  will not let anyone into their home.   Other information discussed the patient needs / wants help with: transportation   Follow up required: yes  Follow-up Outreach - Due: 5/17/2023

## 2023-05-12 NOTE — PROGRESS NOTES
[facility] Follow Up Telemedicine Visit Note    Initial Visit Date: 11/9/2022  Last Visit Date: 2/13/2023  Current Visit Date:  05/15/2023    Chief Complaint:     Chief Complaint   Patient presents with    Patient presents today with a virtual visit       History of Present Illness:      This is a real-time audio/video visit that was performed with the originating site at patient's home and the distant site, [facility]. Verbal consent to participate in interactive audio & video visit was obtained.    I discussed with the patient regarding the nature of our telehealth visits, that:    - Our sessions are not being recorded and that personal health information is protected  - Provider would evaluate the patient and recommend diagnostics and treatments based on my assessment  - [facility] will provide follow up care in person if/when the patient needs it.     This is 52 y.o. female with history of hypertension, hyperlipidemia, diabetes type 2, hypothyroidism, obesity, transaminitis who is referred for SUNCT headache.   Also complaining of memory loss. During last visit, Lamictal extended release was increased to 100 mg once a day.  Memory loss labs were ordered. Had stopped taking lamotrigine due to worsening anxiety.    Age of Onset : 18 years old      Headache Description:   left retro-orbital, sharp, stabbing, seconds, lasting days, with OS lacrimation. Unclear if there is ptosis, conjunctival injection, miosis/mydriasis:  On occasion.  Right frontal burning sensation: resolved      Frequency: as above.      Provocation Factors:  Pollen.     Risk Factors  - Family history of headache disorder: Yes son, mother, and sister with headache.   - History of focal CNS lesions: No  - History of CNS infections: No  - History head trauma: No  - History of underlying mood disorder: No  - History of sleep disorder: No   - Recreational drug use: No  -  Tobacco use: No  - Alcohol use: Yes occasional  - Weight fluctuation: Not Applicable  - Isotretinoin or Tetracycline use:  Not Applicable  - Family planning and contraceptive use: Not Applicable    Medications:     Current Prophylactic  Gabapentin 100 mg twice a day   Lisinopril 20 mg once a day   Lamotrigine extended release 100 mg once a day (2/13/2023 to 3/1/2023): not taking. Worsening anxiety.     Current Abortive  Tylenol - ineffective     Prior Prophylactic  Topiramate 100 mg once at night - ran out of refills    Prior Abortive  Ibuprofen PRN - ineffective     Devices:     - VNS:  - TNS  - TMS:     Procedures:     - Botox:  - PSG block:   - Occipital nerve block:     Labs:     Results for orders placed or performed in visit on 05/01/23   Hepatitis C antibody   Result Value Ref Range    Hepatitis C Antibody Nonreactive Nonreactive   HIV 1/2 Ag/Ab (4th Gen)   Result Value Ref Range    HIV Nonreactive Nonreactive   Comprehensive Metabolic Panel   Result Value Ref Range    Sodium Level 138 136 - 145 mmol/L    Potassium Level 4.2 3.5 - 5.1 mmol/L    Chloride 106 98 - 107 mmol/L    Carbon Dioxide 26 22 - 29 mmol/L    Glucose Level 152 (H) 74 - 100 mg/dL    Blood Urea Nitrogen 18.5 9.8 - 20.1 mg/dL    Creatinine 1.00 0.55 - 1.02 mg/dL    Calcium Level Total 10.0 8.4 - 10.2 mg/dL    Protein Total 7.3 6.4 - 8.3 gm/dL    Albumin Level 4.0 3.5 - 5.0 g/dL    Globulin 3.3 2.4 - 3.5 gm/dL    Albumin/Globulin Ratio 1.2 1.1 - 2.0 ratio    Bilirubin Total 0.4 <=1.5 mg/dL    Alkaline Phosphatase 64 40 - 150 unit/L    Alanine Aminotransferase 28 0 - 55 unit/L    Aspartate Aminotransferase 25 5 - 34 unit/L    eGFR >60 mls/min/1.73/m2   Vitamin B12   Result Value Ref Range    Vitamin B12 Level 566 213 - 816 pg/mL   Folate   Result Value Ref Range    Folate Level 15.4 7.0 - 31.4 ng/mL   TSH   Result Value Ref Range    Thyroid Stimulating Hormone 1.782 0.350 - 4.940 uIU/mL   T4, Free   Result Value Ref Range    Thyroxine Free 1.16  0.70 - 1.48 ng/dL   SYPHILIS ANTIBODY (WITH REFLEX RPR)   Result Value Ref Range    Syphilis Antibody Nonreactive Nonreactive, Equivocal   CBC with Differential   Result Value Ref Range    WBC 3.1 (L) 4.5 - 11.5 x10(3)/mcL    RBC 4.59 4.20 - 5.40 x10(6)/mcL    Hgb 13.7 12.0 - 16.0 g/dL    Hct 42.1 37.0 - 47.0 %    MCV 91.7 80.0 - 94.0 fL    MCH 29.8 27.0 - 31.0 pg    MCHC 32.5 (L) 33.0 - 36.0 g/dL    RDW 13.6 11.5 - 17.0 %    Platelet 163 130 - 400 x10(3)/mcL    MPV 10.7 (H) 7.4 - 10.4 fL    Neut % 49.2 %    Lymph % 40.6 %    Mono % 4.8 %    Eos % 4.5 %    Basophil % 0.6 %    Lymph # 1.26 0.6 - 4.6 x10(3)/mcL    Neut # 1.52 (L) 2.1 - 9.2 x10(3)/mcL    Mono # 0.15 0.1 - 1.3 x10(3)/mcL    Eos # 0.14 0 - 0.9 x10(3)/mcL    Baso # 0.02 0 - 0.2 x10(3)/mcL    IG# 0.01 0 - 0.04 x10(3)/mcL    IG% 0.3 %    NRBC% 0.0 %       Studies:     - MRI Brain with and without contrast August 12, 2019:  I have reviewed the study independently and with the patient.  Unremarkable.  - MRA Head w/o Myron:   - MRV Head w/o Myron:   - NCHCT:  - Lumbar Puncture:    Review of Systems:     All other systems reviewed and are negative.    Physical Exams:     Physical Exam  Nursing note reviewed.   Constitutional:       Appearance: Normal appearance.   HENT:      Head: Normocephalic.   Pulmonary:      Effort: Pulmonary effort is normal.   Musculoskeletal:         General: Normal range of motion.      Cervical back: Normal range of motion.   Neurological:      Mental Status: She is alert.     Telemedicine Comprehensive Neurological Exam:  Mental Status: Alert Oriented to Self, Date, and Place. Comprehension wnl. No dysarthria.   CN II - XII: MARYLU, VA grossly intact, No ptosis OU, EOMI without nystagmus, Hearing grossly intact, Face Symmetric, Tongue and Uvula midline, Trapezius symmetric bilateral.   Motor: no abnormal involuntary or voluntary movements, Fine finger movements wnl b/l, No pronator drift.   Sensory: unable to assess.  Reflexes: unable to  assess.   Cerebellar: RAHM wnl b/l.  Romberg: absent.   Gait: normal.    Assessment:     This is 52 y.o. female with history of hypertension, hyperlipidemia, diabetes type 2, hypothyroidism, obesity, transaminitis who is referred for SUNCT headache, improving.     Problem List Items Addressed This Visit          Neuro    SUNCT (short unilateral neuralgiform headache, conjunctival inj/tear) (Chronic)       Cardiac/Vascular    Essential hypertension - Primary (Chronic)    Mixed hyperlipidemia (Chronic)       Endocrine    Type 2 diabetes mellitus with hyperglycemia, without long-term current use of insulin (Chronic)       GI    Elevated liver enzymes     Other Visit Diagnoses       Class 2 obesity due to excess calories without serious comorbidity with body mass index (BMI) of 36.0 to 36.9 in adult                Plan:     [] continue with Gabapentin 100 mg twice a day   [] continue with Lisinopril 20 mg once a day   [] stop Lamotrigine.  Can restart at lamotrigine 25 mg once a day if SUNCT headache reoccurs.    RTC 3 months with NP    Headache education provided: good sleep hygiene and 7 hours of sleep per night, stress management, medication overuse education provided. Using more 3 OTC per week may worsen headaches, high intensity interval training has shown to reduce headache frequency. Low carb, high protein has shown to reduce headache frequency. Patient is instructed in keep headache diary.     I have explained the treatment plan, diagnosis, and prognosis to patient. All questions are answered to the best of my knowledge.     Face to face time 30 minutes, including documentation, chart review, counseling, education, review of test results, relevant medical records, and coordination of care.       Debbie Gallegos MD   General Neurology  05/15/2023

## 2023-05-15 ENCOUNTER — OFFICE VISIT (OUTPATIENT)
Dept: NEUROLOGY | Facility: CLINIC | Age: 53
End: 2023-05-15

## 2023-05-15 ENCOUNTER — PATIENT OUTREACH (OUTPATIENT)
Dept: ADMINISTRATIVE | Facility: OTHER | Age: 53
End: 2023-05-15

## 2023-05-15 DIAGNOSIS — R74.8 ELEVATED LIVER ENZYMES: ICD-10-CM

## 2023-05-15 DIAGNOSIS — I10 ESSENTIAL HYPERTENSION: ICD-10-CM

## 2023-05-15 DIAGNOSIS — E66.09 CLASS 2 OBESITY DUE TO EXCESS CALORIES WITHOUT SERIOUS COMORBIDITY WITH BODY MASS INDEX (BMI) OF 36.0 TO 36.9 IN ADULT: ICD-10-CM

## 2023-05-15 DIAGNOSIS — E78.2 MIXED HYPERLIPIDEMIA: ICD-10-CM

## 2023-05-15 DIAGNOSIS — G44.059 SUNCT (SHORT UNILATERAL NEURALGIFORM HEADACHE, CONJUNCTIVAL INJ/TEAR): Primary | ICD-10-CM

## 2023-05-15 DIAGNOSIS — E11.65 TYPE 2 DIABETES MELLITUS WITH HYPERGLYCEMIA, WITHOUT LONG-TERM CURRENT USE OF INSULIN: ICD-10-CM

## 2023-05-15 PROCEDURE — 99214 OFFICE O/P EST MOD 30 MIN: CPT | Mod: 95,,, | Performed by: PSYCHIATRY & NEUROLOGY

## 2023-05-15 PROCEDURE — 99214 PR OFFICE/OUTPT VISIT, EST, LEVL IV, 30-39 MIN: ICD-10-PCS | Mod: 95,,, | Performed by: PSYCHIATRY & NEUROLOGY

## 2023-05-15 NOTE — PROGRESS NOTES
CHW - Case Closure    This Community Health Worker spoke to patient via telephone today.   Pt reported: Ms. Moctezuma confirmed she was contact by rep from  and was referred to [facility] for food assistance. She stated her friends bring her wherever she may need to go for her transportation needs. She is aware of [employer] number for Meals on wheels and will contacted for any needs with food resources for her .   Pt  denied any additional needs at this time and agrees with episode closure at this time.  Provided patient with Community Health Worker's contact information and encouraged him/her to contact this Community Health Worker if additional needs arise.

## 2023-05-31 ENCOUNTER — PATIENT MESSAGE (OUTPATIENT)
Dept: INTERNAL MEDICINE | Facility: CLINIC | Age: 53
End: 2023-05-31

## 2023-05-31 RX ORDER — GABAPENTIN 100 MG/1
100 CAPSULE ORAL 2 TIMES DAILY
Qty: 60 CAPSULE | Refills: 2 | Status: SHIPPED | OUTPATIENT
Start: 2023-05-31 | End: 2023-09-11

## 2023-06-14 ENCOUNTER — PATIENT MESSAGE (OUTPATIENT)
Dept: ADMINISTRATIVE | Facility: HOSPITAL | Age: 53
End: 2023-06-14

## 2023-06-14 ENCOUNTER — HOSPITAL ENCOUNTER (OUTPATIENT)
Dept: RADIOLOGY | Facility: HOSPITAL | Age: 53
Discharge: HOME | End: 2023-06-14
Attending: NURSE PRACTITIONER

## 2023-06-14 ENCOUNTER — OFFICE VISIT (OUTPATIENT)
Dept: INTERNAL MEDICINE | Facility: CLINIC | Age: 53
End: 2023-06-14

## 2023-06-14 VITALS
TEMPERATURE: 98 F | DIASTOLIC BLOOD PRESSURE: 84 MMHG | SYSTOLIC BLOOD PRESSURE: 126 MMHG | HEART RATE: 64 BPM | HEIGHT: 63 IN | RESPIRATION RATE: 18 BRPM | BODY MASS INDEX: 39.09 KG/M2 | WEIGHT: 220.63 LBS

## 2023-06-14 DIAGNOSIS — M79.89 LEG SWELLING: ICD-10-CM

## 2023-06-14 DIAGNOSIS — R11.0 NAUSEA: ICD-10-CM

## 2023-06-14 DIAGNOSIS — R63.0 DECREASED APPETITE: Primary | ICD-10-CM

## 2023-06-14 DIAGNOSIS — Z13.5 DIABETIC RETINOPATHY SCREENING: ICD-10-CM

## 2023-06-14 DIAGNOSIS — E11.65 TYPE 2 DIABETES MELLITUS WITH HYPERGLYCEMIA, WITHOUT LONG-TERM CURRENT USE OF INSULIN: Chronic | ICD-10-CM

## 2023-06-14 DIAGNOSIS — Z59.9 FINANCIAL DIFFICULTIES: ICD-10-CM

## 2023-06-14 DIAGNOSIS — Z59.82 LACK OF ACCESS TO TRANSPORTATION: ICD-10-CM

## 2023-06-14 PROCEDURE — 99215 OFFICE O/P EST HI 40 MIN: CPT | Mod: PBBFAC | Performed by: NURSE PRACTITIONER

## 2023-06-14 PROCEDURE — 99214 OFFICE O/P EST MOD 30 MIN: CPT | Mod: 25,S$PBB,, | Performed by: NURSE PRACTITIONER

## 2023-06-14 PROCEDURE — 74019 RADEX ABDOMEN 2 VIEWS: CPT | Mod: TC

## 2023-06-14 PROCEDURE — 99214 PR OFFICE/OUTPT VISIT, EST, LEVL IV, 30-39 MIN: ICD-10-PCS | Mod: 25,S$PBB,, | Performed by: NURSE PRACTITIONER

## 2023-06-14 RX ORDER — LAMOTRIGINE 100 MG/1
100 TABLET, EXTENDED RELEASE ORAL DAILY
COMMUNITY
Start: 2023-05-28 | End: 2023-09-11 | Stop reason: SDUPTHER

## 2023-06-14 RX ORDER — ONDANSETRON 4 MG/1
4 TABLET, ORALLY DISINTEGRATING ORAL EVERY 8 HOURS PRN
Qty: 20 TABLET | Refills: 1 | Status: SHIPPED | OUTPATIENT
Start: 2023-06-14

## 2023-06-14 SDOH — SOCIAL DETERMINANTS OF HEALTH (SDOH): TRANSPORTATION INSECURITY: Z59.82

## 2023-06-14 SDOH — SOCIAL DETERMINANTS OF HEALTH (SDOH): PROBLEM RELATED TO HOUSING AND ECONOMIC CIRCUMSTANCES, UNSPECIFIED: Z59.9

## 2023-06-14 NOTE — PROGRESS NOTES
Patient ID: [id number]     Chief Complaint: Anorexia and Nausea    HPI:     Scotty Moctezuma is a 52 y.o. female with diagnosis of HTN, HLD, DM2, Hypothyroidism, SUNCT Headaches, Obesity. Patient seen per virtual visit today for follow up on DM2. Patient last seen in clinic on 2022.  Today, patient states decreased appetite x4 weeks. States only able to eat watermelon and cucumbers. Patient denies abdominal pain, V/D/C. Patient states last BM yesterday. Patient states the smell of food makes her nauseous. 3 lb weight gain noted in 6 months. Patient denies any other acute complaints. Patient does have uncontrolled DM2. Patient currently prescribed Glimepiride 6 mg Qam, Metformin 1,000 mg bid, Actos 30 mg daily. Patient does not have health insurance and is without transportation so is limited on the medication she can purchase. Previous A1c 8.1.  Patient currently prescribed Atorvastatin for HLD. States taking medications as prescribed, tolerating well.     Patient followed by Neurology Clinic. Last appointment on 05/15/2023. Dx: SUNCT. Plan: continue Gabapentin 100 mg bid, Lisinopril 20 mg daily. Stop Lamotrigine. Patient has follow up appointment scheduled for 2023.       Patient followed by GYN clinic. Patient has follow up appointment scheduled for 01/10/2024.     Review of patient's allergies indicates:  No Known Allergies    Breast Cancer Screening: MMG benign on 2021; ordered 2022  Cervical Cancer Screenin2021  Colorectal Cancer Screening: Colonoscopy on 2022 with recommended repeat in 10 years  Diabetic Eye Exam: 2021, followed by [facility]  Diabetic Foot Exam: 2022  Lung Cancer Screening: N/A  Prostate Cancer Screening: N/A  AAA Screening: N/A  Osteoporosis Screening: deferred due to age  Medicare Wellness: N/A  Immunizations:   Immunization History   Administered Date(s) Administered    COVID-19 Vaccine 2022    COVID-19, MRNA, LN-S, PF (Pfizer)  (Purple Cap) 2021, 2021    Influenza - Quadrivalent - PF *Preferred* (6 months and older) 2021, 2022, 2022    Tdap 2020    Zoster 2022     Past Surgical History:   Procedure Laterality Date     SECTION      CHOLECYSTECTOMY       family history includes Diabetes Mellitus in her father; Heart failure in her mother.    Social History     Socioeconomic History    Marital status:     Number of children: 3   Tobacco Use    Smoking status: Never    Smokeless tobacco: Never   Substance and Sexual Activity    Alcohol use: Yes     Comment: Occasionally    Drug use: Never    Sexual activity: Yes     Partners: Male   Social History Narrative    ** Merged History Encounter **          Social Determinants of Health     Financial Resource Strain: Low Risk     Difficulty of Paying Living Expenses: Not very hard   Food Insecurity: Food Insecurity Present    Worried About Running Out of Food in the Last Year: Often true    Ran Out of Food in the Last Year: Often true   Transportation Needs: Unmet Transportation Needs    Lack of Transportation (Medical): Yes    Lack of Transportation (Non-Medical): Yes   Physical Activity: Sufficiently Active    Days of Exercise per Week: 3 days    Minutes of Exercise per Session: 60 min   Stress: No Stress Concern Present    Feeling of Stress : Not at all   Social Connections: Moderately Integrated    Frequency of Communication with Friends and Family: More than three times a week    Frequency of Social Gatherings with Friends and Family: Never    Attends [employer] Services: More than 4 times per year    Active Member of Clubs or Organizations: No    Attends Club or Organization Meetings: Never    Marital Status:    Housing Stability: Low Risk     Unable to Pay for Housing in the Last Year: No    Number of Places Lived in the Last Year: 1    Unstable Housing in the Last Year: No     Current Outpatient Medications   Medication  "Instructions    amLODIPine (NORVASC) 10 mg, Oral, Daily    atorvastatin (LIPITOR) 40 mg, Oral, Daily    EUTHYROX 100 mcg, Oral, Daily    gabapentin (NEURONTIN) 100 mg, Oral, 2 times daily    glimepiride (AMARYL) 6 mg, Oral, With breakfast    lamotrigine XR (LAMICTAL XR) 100 mg, Oral, Daily    lisinopriL (PRINIVIL,ZESTRIL) 20 mg, Oral, Daily    metFORMIN (GLUCOPHAGE) 1,000 mg, Oral, 2 times daily    multivitamin (THERAGRAN) per tablet 1 tablet, Oral, Daily, For women over 50 years    ondansetron (ZOFRAN-ODT) 4 mg, Oral, Every 8 hours PRN    pioglitazone (ACTOS) 30 mg, Oral, Daily       Subjective:     Review of Systems   Constitutional:  Positive for appetite change.   HENT: Negative.     Eyes: Negative.    Respiratory: Negative.     Cardiovascular: Negative.    Gastrointestinal: Negative.    Endocrine: Negative.    Genitourinary: Negative.    Musculoskeletal: Negative.    Skin: Negative.    Allergic/Immunologic: Negative.    Neurological: Negative.    Hematological: Negative.    Psychiatric/Behavioral: Negative.       Objective:     Visit Vitals  /84 (BP Location: Left arm, Patient Position: Sitting, BP Method: Large (Automatic))   Pulse 64   Temp 98.2 °F (36.8 °C) (Oral)   Resp 18   Ht 5' 2.99" (1.6 m)   Wt 100.1 kg (220 lb 9.6 oz)   LMP 10/30/2020 (Exact Date)   BMI 39.09 kg/m²     Physical Exam  Vitals reviewed.   Constitutional:       Appearance: Normal appearance.   HENT:      Head: Normocephalic and atraumatic.      Mouth/Throat:      Mouth: Mucous membranes are moist.      Pharynx: Oropharynx is clear.   Eyes:      Extraocular Movements: Extraocular movements intact.      Conjunctiva/sclera: Conjunctivae normal.      Pupils: Pupils are equal, round, and reactive to light.   Cardiovascular:      Rate and Rhythm: Normal rate and regular rhythm.      Heart sounds: Normal heart sounds.   Pulmonary:      Effort: Pulmonary effort is normal.      Breath sounds: Normal breath sounds.   Abdominal:      General: " Bowel sounds are normal.      Palpations: Abdomen is soft.      Tenderness: There is no abdominal tenderness. There is no guarding.   Musculoskeletal:         General: Normal range of motion.      Cervical back: Normal range of motion.      Right lower le+ Edema present.      Left lower le+ Edema present.   Skin:     General: Skin is warm and dry.   Neurological:      Mental Status: She is alert and oriented to person, place, and time.   Psychiatric:         Mood and Affect: Mood normal.         Behavior: Behavior normal.     Protective Sensation (w/ 10 gram monofilament):  Right: Intact  Left: Intact    Visual Inspection:  Dry Skin -  Bilateral    Pedal Pulses:   Right: Diminished  Left: Diminished    Posterior Tibialis Pulses:   Right:Diminished  Left: Diminished    Labs Reviewed:     Hematology:  Lab Results   Component Value Date    WBC 3.1 (L) 2023    HGB 13.7 2023    HCT 42.1 2023     2023     Chemistry:  Lab Results   Component Value Date     2023    K 4.2 2023    CHLORIDE 106 2023    BUN 18.5 2023    CREATININE 1.00 2023    EGFRNORACEVR >60 2023    GLUCOSE 152 (H) 2023    CALCIUM 10.0 2023    ALKPHOS 64 2023    LABPROT 7.3 2023    ALBUMIN 4.0 2023    BILIDIR 0.1 2021    IBILI 0.30 2021    AST 25 2023    ALT 28 2023    MG 2.1 2019    PHOS 3.7 2019    [id number] 47.7 2022      Lab Results   Component Value Date    HGBA1C 8.1 (H) 2022      Lipid Panel:  Lab Results   Component Value Date    CHOL 275 (H) 2022    HDL 68 (H) 2022    .00 (H) 2022    TRIG 144 (H) 2022    TOTALCHOLEST 4 2022      Thyroid:  Lab Results   Component Value Date    TSH 1.782 2023    T3FREE 1.96 (L) 2020      Urine:  Lab Results   Component Value Date    COLORUA Light-Yellow (A) 2022    APPEARANCEUA Clear 2022    SGUA  1.012 07/01/2022    PHUA 6.0 07/01/2022    PROTEINUA Negative 07/01/2022    GLUCOSEUA Normal 07/01/2022    KETONESUA Negative 07/01/2022    BLOODUA Negative 07/01/2022    NITRITESUA Negative 07/01/2022    LEUKOCYTESUR Negative 07/01/2022    RBCUA 0-5 07/01/2022    WBCUA 0-5 07/01/2022    BACTERIA Occ (A) 07/01/2022    SQEPUA Few (A) 07/01/2022    HYALINECASTS None Seen 07/01/2022    CREATRANDUR 72.3 07/01/2022        Assessment:       ICD-10-CM ICD-9-CM   1. Decreased appetite  R63.0 783.0   2. Nausea  R11.0 787.02   3. Type 2 diabetes mellitus with hyperglycemia, without long-term current use of insulin  E11.65 250.00     790.29   4. Leg swelling  M79.89 729.81   5. Diabetic retinopathy screening  Z13.5 V80.2   6. Financial difficulties  Z59.9 V60.2   7. Lack of access to transportation  Z59.82 V15.89        Plan:     1. Decreased appetite  Abdomen X-ray ordered  3 lb weight gain noted in 6 months    2. Nausea  Start Zofran 4 mg Q8h prn  Notify clinic if no improvement  - X-Ray Abdomen Flat And Erect; Future    3. Type 2 diabetes mellitus with hyperglycemia, without long-term current use of insulin  Continue Metformin 1,000 mg bid, Glimepiride 6 mg daily, Actos 30 mg daily  Encouraged home CBG monitoring.  Hypoglycemic episodes: denies  Body mass index is 39.09 kg/m².  Hemoglobin A1c   Date Value Ref Range Status   11/11/2022 8.1 (H) <=7.0 % Final     Results for orders placed or performed in visit on 07/01/22   Microalbumin/Creatinine Ratio, Urine   Result Value Ref Range    Urine Microalbumin 14.8 <=30.0 ug/ml    Urine Creatinine 72.3 47.0 - 110.0 mg/dL    Microalbumin Creatinine Ratio 20.5 0.0 - 30.0 mg/gm Cr   On Atorvastatin  On Lisinopril  Weight Loss Encouraged  ADA Diet  - CBC Auto Differential; Future  - Comprehensive Metabolic Panel; Future  - Hemoglobin A1C; Future  - Lipid Panel; Future  - Urinalysis; Future  - Microalbumin/Creatinine Ratio, Urine; Future  - Urinalysis    4. Leg swelling  BLE edema  noted, 1+  Patient denies SOB, chest pain, cough  BBS clear  Patient denies low salt diet, states he legs swell when she eats salt    5. Diabetic retinopathy screening  - Diabetic Eye Screening Photo    6. Financial difficulties  - Ambulatory referral/consult to Outpatient Case Management    7. Lack of access to transportation  - Ambulatory referral/consult to Outpatient Case Management      Follow up if symptoms worsen or fail to improve. In addition to their scheduled follow up, the patient has also been instructed to follow up on as needed basis.     Catrina Quiroz, LOGANP

## 2023-06-19 ENCOUNTER — PATIENT MESSAGE (OUTPATIENT)
Dept: ADMINISTRATIVE | Facility: HOSPITAL | Age: 53
End: 2023-06-19

## 2023-06-21 ENCOUNTER — TELEPHONE (OUTPATIENT)
Dept: INTERNAL MEDICINE | Facility: CLINIC | Age: 53
End: 2023-06-21

## 2023-06-21 RX ORDER — POLYETHYLENE GLYCOL 3350 17 G/17G
17 POWDER, FOR SOLUTION ORAL DAILY
Qty: 235 G | Refills: 1 | Status: SHIPPED | OUTPATIENT
Start: 2023-06-21 | End: 2023-09-11

## 2023-09-08 NOTE — PROGRESS NOTES
"[facility] Neurology Follow Up Telemedicine Visit Note    Initial Visit Date: 11/9/2022  Last Visit Date: 5/15/2023  Current Visit Date:  09/11/2023    This is a real-time audio/video visit that was performed with the originating site at patient's home and the distant site, [facility]. Verbal consent to participate in interactive audio & video visit was obtained.    I discussed with the patient regarding the nature of our telehealth visits, that:    - Our sessions are not being recorded and that personal health information is protected  - Provider would evaluate the patient and recommend diagnostics and treatments based on my assessment  - [facility] will provide follow up care in person if/when the patient needs it.     Chief Complaint:     Chief Complaint   Patient presents with    Migraine     Patient reports ocular headaches under control with medications. She does c/o other migraines with aura. Associated with nausea and sensitivity to light and smell.      History of Present Illness:      This is 52 y.o. female with history of hypertension, hyperlipidemia, diabetes type 2, hypothyroidism, obesity, transaminitis who is referred for SUNCT headache.   Also complaining of memory loss. During last visit, LTG was discontinued w/instruction to restart if need. Pt states she restarted medication 1 month after discontinuing.     Currently c/o "new" HA that starts on L parietal/crown area that radiates to both eyes and down L side of neck, accompanied by sensitivity to smells, sound, nausea. Started after last visit w/Dr. Gallegos. Occur daily, some days worse than others. Has tried Tylenol, but seem to get worse, Ice pack effective, usually sleeps it off. States she does not wish to start new medication. Believes it may be related to change in weather. States SUNCT HA is under control w/LTG. No longer on gabapentin. Has changed diet to renal " diet as that is what her spouse is on and has noticed a 10 pound weight loss.    Age of Onset : 18 years old      Headache Description:   left retro-orbital, sharp, stabbing, seconds, lasting days, with OS lacrimation. Unclear if there is ptosis, conjunctival injection, miosis/mydriasis:  On occasion.  Right frontal burning sensation: resolved      Frequency: as above.      Provocation Factors:  Pollen.     Risk Factors  - Family history of headache disorder: Yes son, mother, and sister with headache.   - History of focal CNS lesions: No  - History of CNS infections: No  - History head trauma: No  - History of underlying mood disorder: No  - History of sleep disorder: No   - Recreational drug use: No  - Tobacco use: No  - Alcohol use: Yes occasional  - Weight fluctuation: Not Applicable  - Isotretinoin or Tetracycline use:  Not Applicable  - Family planning and contraceptive use: Not Applicable    Medications:     Current Prophylactic  Lisinopril 20 mg once a day   Lamotrigine extended release 100 mg once a day (2/13/2023 to 3/1/2023): not taking. Worsening anxiety.     Current Abortive  Tylenol - ineffective     Prior Prophylactic  Topiramate 100 mg once at night - ran out of refills  Gabapentin 100 mg twice a day (? - 5/31/2023) - ineffective    Prior Abortive  Ibuprofen PRN - ineffective     Devices:     - VNS:  - TNS  - TMS:     Procedures:     - Botox:  - PSG block:   - Occipital nerve block:     Labs:     Results for orders placed or performed in visit on 05/01/23   Hepatitis C antibody   Result Value Ref Range    Hep C Ab Interp Nonreactive Nonreactive   HIV 1/2 Ag/Ab (4th Gen)   Result Value Ref Range    HIV Nonreactive Nonreactive   Comprehensive Metabolic Panel   Result Value Ref Range    Sodium Level 138 136 - 145 mmol/L    Potassium Level 4.2 3.5 - 5.1 mmol/L    Chloride 106 98 - 107 mmol/L    Carbon Dioxide 26 22 - 29 mmol/L    Glucose Level 152 (H) 74 - 100 mg/dL    Blood Urea Nitrogen 18.5 9.8 - 20.1  mg/dL    Creatinine 1.00 0.55 - 1.02 mg/dL    Calcium Level Total 10.0 8.4 - 10.2 mg/dL    Protein Total 7.3 6.4 - 8.3 gm/dL    Albumin Level 4.0 3.5 - 5.0 g/dL    Globulin 3.3 2.4 - 3.5 gm/dL    Albumin/Globulin Ratio 1.2 1.1 - 2.0 ratio    Bilirubin Total 0.4 <=1.5 mg/dL    Alkaline Phosphatase 64 40 - 150 unit/L    Alanine Aminotransferase 28 0 - 55 unit/L    Aspartate Aminotransferase 25 5 - 34 unit/L    eGFR >60 mls/min/1.73/m2   Vitamin B12   Result Value Ref Range    Vitamin B12 Level 566 213 - 816 pg/mL   Folate   Result Value Ref Range    Folate Level 15.4 7.0 - 31.4 ng/mL   TSH   Result Value Ref Range    Thyroid Stimulating Hormone 1.782 0.350 - 4.940 uIU/mL   T4, Free   Result Value Ref Range    Thyroxine Free 1.16 0.70 - 1.48 ng/dL   SYPHILIS ANTIBODY (WITH REFLEX RPR)   Result Value Ref Range    Syphilis Antibody Nonreactive Nonreactive, Equivocal   CBC with Differential   Result Value Ref Range    WBC 3.1 (L) 4.5 - 11.5 x10(3)/mcL    RBC 4.59 4.20 - 5.40 x10(6)/mcL    Hgb 13.7 12.0 - 16.0 g/dL    Hct 42.1 37.0 - 47.0 %    MCV 91.7 80.0 - 94.0 fL    MCH 29.8 27.0 - 31.0 pg    MCHC 32.5 (L) 33.0 - 36.0 g/dL    RDW 13.6 11.5 - 17.0 %    Platelet 163 130 - 400 x10(3)/mcL    MPV 10.7 (H) 7.4 - 10.4 fL    Neut % 49.2 %    Lymph % 40.6 %    Mono % 4.8 %    Eos % 4.5 %    Basophil % 0.6 %    Lymph # 1.26 0.6 - 4.6 x10(3)/mcL    Neut # 1.52 (L) 2.1 - 9.2 x10(3)/mcL    Mono # 0.15 0.1 - 1.3 x10(3)/mcL    Eos # 0.14 0 - 0.9 x10(3)/mcL    Baso # 0.02 0 - 0.2 x10(3)/mcL    IG# 0.01 0 - 0.04 x10(3)/mcL    IG% 0.3 %    NRBC% 0.0 %       Studies:     - MRI Brain with and without contrast August 12, 2019:  I have reviewed the study independently and with the patient.  Unremarkable.  - MRA Head w/o Myron:   - MRV Head w/o Myron:   - NCHCT:  - Lumbar Puncture:    Review of Systems:     All other systems reviewed and are negative.    Physical Exams:     Physical Exam  Nursing note reviewed.   Constitutional:        "Appearance: Normal appearance.   HENT:      Head: Normocephalic.      Right Ear: External ear normal.      Left Ear: External ear normal.      Nose: Nose normal.      Mouth/Throat:      Mouth: Mucous membranes are moist.   Pulmonary:      Effort: Pulmonary effort is normal.   Abdominal:      General: There is no distension.      Tenderness: There is no guarding.      Comments: round   Musculoskeletal:         General: Normal range of motion.      Cervical back: Normal range of motion.   Skin:     Findings: No lesion or rash.   Neurological:      General: No focal deficit present.      Mental Status: She is alert.   Psychiatric:         Mood and Affect: Mood normal.       Telemedicine Comprehensive Neurological Exam:  Mental Status: Alert Oriented to Self, Date, and Place. Comprehension wnl. No dysarthria.   CN II - XII: MARYLU, VA grossly intact, No ptosis OU, EOMI without nystagmus, Hearing grossly intact, Face Symmetric, Tongue and Uvula midline, Trapezius symmetric bilateral.   Motor: no abnormal involuntary or voluntary movements, Fine finger movements wnl b/l, No pronator drift.   Sensory: unable to assess.  Reflexes: unable to assess.   Cerebellar: RAHM wnl b/l.  Romberg: absent.   Gait: normal.    Assessment:     This is 52 y.o. female with history of hypertension, hyperlipidemia, diabetes type 2, hypothyroidism, obesity, transaminitis who is referred for SUNCT headache. Attempted to wean off LTG, but HA returned 1 month after. C/O "new" type of HA that worsens with Tylenol. Does not wish to take any new medication    Problem List Items Addressed This Visit          Neuro    SUNCT (short unilateral neuralgiform headache, conjunctival inj/tear) - Primary (Chronic)    Stroke-like symptoms    Memory loss       Endocrine    Class 2 obesity due to excess calories without serious comorbidity with body mass index (BMI) of 38.0 to 38.9 in adult (Chronic)     Plan:     [] c/w Lisinopril 20 mg once a day   [] c/w " Lamotrigine XR 100mg daily for SUNCT headache   [] consider MagOx and/or Riboflavin at next visit    RTC 6 months    Headache education provided: good sleep hygiene and 7 hours of sleep per night, stress management, medication overuse education provided. Using more 3 OTC per week may worsen headaches, high intensity interval training has shown to reduce headache frequency. Low carb, high protein has shown to reduce headache frequency. Patient is instructed in keep headache diary.     I have explained the treatment plan, diagnosis, and prognosis to patient. All questions are answered to the best of my knowledge.     Face to face time 30 minutes, including documentation, chart review, counseling, education, review of test results, relevant medical records, and coordination of care.     09/11/2023

## 2023-09-11 ENCOUNTER — OFFICE VISIT (OUTPATIENT)
Dept: NEUROLOGY | Facility: CLINIC | Age: 53
End: 2023-09-11

## 2023-09-11 DIAGNOSIS — E66.09 CLASS 2 OBESITY DUE TO EXCESS CALORIES WITHOUT SERIOUS COMORBIDITY WITH BODY MASS INDEX (BMI) OF 38.0 TO 38.9 IN ADULT: Chronic | ICD-10-CM

## 2023-09-11 DIAGNOSIS — G44.059 SUNCT (SHORT UNILATERAL NEURALGIFORM HEADACHE, CONJUNCTIVAL INJ/TEAR): Primary | Chronic | ICD-10-CM

## 2023-09-11 DIAGNOSIS — R29.90 STROKE-LIKE SYMPTOMS: ICD-10-CM

## 2023-09-11 DIAGNOSIS — R41.3 MEMORY LOSS: ICD-10-CM

## 2023-09-11 PROCEDURE — 99214 PR OFFICE/OUTPT VISIT, EST, LEVL IV, 30-39 MIN: ICD-10-PCS | Mod: 95,,, | Performed by: NURSE PRACTITIONER

## 2023-09-11 PROCEDURE — 99214 OFFICE O/P EST MOD 30 MIN: CPT | Mod: 95,,, | Performed by: NURSE PRACTITIONER

## 2023-09-11 RX ORDER — LAMOTRIGINE 100 MG/1
100 TABLET, EXTENDED RELEASE ORAL DAILY
Qty: 90 TABLET | Refills: 3 | Status: SHIPPED | OUTPATIENT
Start: 2023-09-11

## 2023-11-16 ENCOUNTER — PATIENT MESSAGE (OUTPATIENT)
Dept: INTERNAL MEDICINE | Facility: CLINIC | Age: 53
End: 2023-11-16

## 2023-11-17 NOTE — TELEPHONE ENCOUNTER
Please see the attached refill request.   LOV 06/14/2023  NO SHOW 08/02/2023  NO SCHEDULED APPOINTMENT

## 2023-11-27 RX ORDER — PIOGLITAZONEHYDROCHLORIDE 30 MG/1
30 TABLET ORAL DAILY
Qty: 90 TABLET | Refills: 0 | Status: SHIPPED | OUTPATIENT
Start: 2023-11-27 | End: 2024-02-22 | Stop reason: SDUPTHER

## 2023-11-27 RX ORDER — GLIMEPIRIDE 4 MG/1
6 TABLET ORAL
Qty: 135 TABLET | Refills: 0 | Status: SHIPPED | OUTPATIENT
Start: 2023-11-27 | End: 2024-02-22 | Stop reason: SDUPTHER

## 2023-11-27 RX ORDER — LISINOPRIL 20 MG/1
20 TABLET ORAL DAILY
Qty: 90 TABLET | Refills: 0 | Status: SHIPPED | OUTPATIENT
Start: 2023-11-27 | End: 2024-02-22 | Stop reason: SDUPTHER

## 2023-11-27 RX ORDER — LEVOTHYROXINE SODIUM 100 UG/1
100 TABLET ORAL DAILY
Qty: 90 TABLET | Refills: 0 | Status: SHIPPED | OUTPATIENT
Start: 2023-11-27 | End: 2024-02-22 | Stop reason: SDUPTHER

## 2023-11-27 RX ORDER — AMLODIPINE BESYLATE 10 MG/1
10 TABLET ORAL DAILY
Qty: 90 TABLET | Refills: 0 | Status: SHIPPED | OUTPATIENT
Start: 2023-11-27 | End: 2024-02-22 | Stop reason: SDUPTHER

## 2023-11-27 RX ORDER — ATORVASTATIN CALCIUM 40 MG/1
40 TABLET, FILM COATED ORAL DAILY
Qty: 90 TABLET | Refills: 0 | Status: SHIPPED | OUTPATIENT
Start: 2023-11-27 | End: 2024-02-22 | Stop reason: SDUPTHER

## 2023-11-27 RX ORDER — METFORMIN HYDROCHLORIDE 1000 MG/1
1000 TABLET ORAL 2 TIMES DAILY
Qty: 180 TABLET | Refills: 0 | Status: SHIPPED | OUTPATIENT
Start: 2023-11-27 | End: 2024-02-22 | Stop reason: SDUPTHER

## 2024-01-02 ENCOUNTER — TELEPHONE (OUTPATIENT)
Dept: INTERNAL MEDICINE | Facility: CLINIC | Age: 54
End: 2024-01-02

## 2024-01-02 NOTE — TELEPHONE ENCOUNTER
Return call. Was informed that she has a sore throat, chills, and a bad headache. I instructed her to go to Urgent Care to get tested for Covid, Strep and flu.Instructed to wear a mask.She voiced understanding. Will inform us of the results.

## 2024-01-02 NOTE — TELEPHONE ENCOUNTER
----- Message from Florin Lagunas sent at 1/2/2024 10:16 AM CST -----  .Type:  Needs Medical Advice    Who Called: Felicita  Symptoms (please be specific):    How long has patient had these symptoms:    Pharmacy name and phone #:    Would the patient rather a call back or a response via [facility]?   Best Call Back Number: [phone]  Additional Information: Patient is needing to speak with Ms. Fritz please call her back.

## 2024-01-08 ENCOUNTER — PATIENT MESSAGE (OUTPATIENT)
Dept: INTERNAL MEDICINE | Facility: CLINIC | Age: 54
End: 2024-01-08

## 2024-01-10 ENCOUNTER — OFFICE VISIT (OUTPATIENT)
Dept: GYNECOLOGY | Facility: CLINIC | Age: 54
End: 2024-01-10

## 2024-01-10 VITALS
SYSTOLIC BLOOD PRESSURE: 139 MMHG | RESPIRATION RATE: 18 BRPM | DIASTOLIC BLOOD PRESSURE: 83 MMHG | TEMPERATURE: 98 F | WEIGHT: 221.63 LBS | OXYGEN SATURATION: 100 % | HEART RATE: 74 BPM | HEIGHT: 62 IN | BODY MASS INDEX: 40.78 KG/M2

## 2024-01-10 DIAGNOSIS — N81.4 UTERINE PROLAPSE: ICD-10-CM

## 2024-01-10 DIAGNOSIS — Z12.31 SCREENING MAMMOGRAM FOR BREAST CANCER: ICD-10-CM

## 2024-01-10 DIAGNOSIS — Z01.419 WOMEN'S ANNUAL ROUTINE GYNECOLOGICAL EXAMINATION: Primary | ICD-10-CM

## 2024-01-10 PROCEDURE — 99396 PREV VISIT EST AGE 40-64: CPT | Mod: S$PBB,,, | Performed by: NURSE PRACTITIONER

## 2024-01-10 PROCEDURE — 87624 HPV HI-RISK TYP POOLED RSLT: CPT

## 2024-01-10 PROCEDURE — 99214 OFFICE O/P EST MOD 30 MIN: CPT | Mod: PBBFAC | Performed by: NURSE PRACTITIONER

## 2024-01-10 PROCEDURE — 88174 CYTOPATH C/V AUTO IN FLUID: CPT | Performed by: NURSE PRACTITIONER

## 2024-01-10 NOTE — PROGRESS NOTES
"Patient ID: Scotty Moctezuma is a 53 y.o. female.    Chief Complaint: Annual Exam      Review of patient's allergies indicates:  No Known Allergies          HPI:  The patient is  here for annual gyn exam. Denies hx of abnormal pap. Last pap . Pt is postmenopausal. Denies vaginal bleeding/discharge. Denies abd/pelvic pain .Denies breast complaints. Denies dysuria. States will occasionally have to lean forward to urinate. Denies vaginal pressure. Pt is  and not sexually active d/t spouse's health.     Review of Systems:   Negative except for findings in HPI     Objective:   /83   Pulse 74   Temp 98.1 °F (36.7 °C) (Oral)   Resp 18   Ht 5' 2" (1.575 m)   Wt 100.5 kg (221 lb 9.6 oz)   LMP 10/30/2020 (Exact Date)   SpO2 100%   BMI 40.53 kg/m²    Physical Exam:  GENERAL: Pt is aware and alert and  in no acute distress.  BREASTS: Bilateral-No masses, nipple discharge, skin changes, or tenderness.  ABDOMEN: Soft, non tender. Vertical scar lower abdomen  VULVA:  No lesions or skin changes.  URETHRA: No lesions  BLADDER: No tenderness.  VAGINA: Mucosa normal,no discharge; no lesions.  CERVIX:  no CMT, NO discharge; NO lesions  BIMANUAL EXAM:. Anterior portion of cervix visible through introitus; The uterus is mobile, nontender, no palpable masses. Scott adnexa reveal no evidence of masses; no fullness   SKIN: Warm and Dry  PSYCHIATRIC: Patient is awake and alert. Mood and affect are normal.    Assessment:   Women's annual routine gynecological examination  -     Liquid-Based Pap Smear, Screening Screening    Screening mammogram for breast cancer  -     Mammo Digital Screening Bilat w/ Rahul; Future; Expected date: 02/10/2024    Uterine prolapse            1. Women's annual routine gynecological examination    2. Screening mammogram for breast cancer    3. Uterine prolapse             -pap/hpv  -Contact clinic with any vaginal bleeding as this would be abnormal in postmenopausal pt.   -declined sti " screening  -uterine prolapse incidentally found on exam; discussed treatment options (pessary vs surgical management); pt states is not bothered by prolapse and not interested in treatment at this time  Plan:       Follow up in about 1 year (around 1/10/2025).

## 2024-01-12 LAB — PSYCHE PATHOLOGY RESULT: NORMAL

## 2024-01-30 ENCOUNTER — HOSPITAL ENCOUNTER (OUTPATIENT)
Dept: RADIOLOGY | Facility: HOSPITAL | Age: 54
Discharge: HOME | End: 2024-01-30
Attending: NURSE PRACTITIONER

## 2024-01-30 DIAGNOSIS — Z12.31 SCREENING MAMMOGRAM FOR BREAST CANCER: ICD-10-CM

## 2024-01-30 PROCEDURE — 77067 SCR MAMMO BI INCL CAD: CPT | Mod: TC

## 2024-01-30 PROCEDURE — 77063 BREAST TOMOSYNTHESIS BI: CPT | Mod: 26,,, | Performed by: RADIOLOGY

## 2024-01-30 PROCEDURE — 77067 SCR MAMMO BI INCL CAD: CPT | Mod: 26,,, | Performed by: RADIOLOGY

## 2024-01-31 DIAGNOSIS — E78.2 MIXED HYPERLIPIDEMIA: Chronic | ICD-10-CM

## 2024-01-31 DIAGNOSIS — I10 ESSENTIAL HYPERTENSION: Primary | Chronic | ICD-10-CM

## 2024-01-31 DIAGNOSIS — E11.65 TYPE 2 DIABETES MELLITUS WITH HYPERGLYCEMIA, WITHOUT LONG-TERM CURRENT USE OF INSULIN: Chronic | ICD-10-CM

## 2024-02-16 ENCOUNTER — LAB VISIT (OUTPATIENT)
Dept: LAB | Facility: HOSPITAL | Age: 54
End: 2024-02-16
Attending: NURSE PRACTITIONER

## 2024-02-16 DIAGNOSIS — E78.2 MIXED HYPERLIPIDEMIA: Chronic | ICD-10-CM

## 2024-02-16 DIAGNOSIS — E11.65 TYPE 2 DIABETES MELLITUS WITH HYPERGLYCEMIA, WITHOUT LONG-TERM CURRENT USE OF INSULIN: ICD-10-CM

## 2024-02-16 LAB
ALBUMIN SERPL-MCNC: 4 G/DL (ref 3.5–5)
ALBUMIN/GLOB SERPL: 1.1 RATIO (ref 1.1–2)
ALP SERPL-CCNC: 119 UNIT/L (ref 40–150)
ALT SERPL-CCNC: 65 UNIT/L (ref 0–55)
APPEARANCE UR: CLEAR
AST SERPL-CCNC: 36 UNIT/L (ref 5–34)
BACTERIA #/AREA URNS AUTO: ABNORMAL /HPF
BASOPHILS # BLD AUTO: 0.03 X10(3)/MCL
BASOPHILS NFR BLD AUTO: 0.8 %
BILIRUB SERPL-MCNC: 0.5 MG/DL
BILIRUB UR QL STRIP.AUTO: NEGATIVE
BUN SERPL-MCNC: 13 MG/DL (ref 9.8–20.1)
CALCIUM SERPL-MCNC: 9.8 MG/DL (ref 8.4–10.2)
CHLORIDE SERPL-SCNC: 104 MMOL/L (ref 98–107)
CHOLEST SERPL-MCNC: 165 MG/DL
CHOLEST/HDLC SERPL: 2 {RATIO} (ref 0–5)
CO2 SERPL-SCNC: 25 MMOL/L (ref 22–29)
COLOR UR AUTO: ABNORMAL
CREAT SERPL-MCNC: 1.06 MG/DL (ref 0.55–1.02)
CREAT UR-MCNC: 84.2 MG/DL (ref 45–106)
EOSINOPHIL # BLD AUTO: 0.18 X10(3)/MCL (ref 0–0.9)
EOSINOPHIL NFR BLD AUTO: 4.9 %
ERYTHROCYTE [DISTWIDTH] IN BLOOD BY AUTOMATED COUNT: 13.9 % (ref 11.5–17)
EST. AVERAGE GLUCOSE BLD GHB EST-MCNC: 180 MG/DL
GFR SERPLBLD CREATININE-BSD FMLA CKD-EPI: >60 MLS/MIN/1.73/M2
GLOBULIN SER-MCNC: 3.6 GM/DL (ref 2.4–3.5)
GLUCOSE SERPL-MCNC: 129 MG/DL (ref 74–100)
GLUCOSE UR QL STRIP.AUTO: NORMAL
HBA1C MFR BLD: 7.9 %
HCT VFR BLD AUTO: 44.6 % (ref 37–47)
HDLC SERPL-MCNC: 67 MG/DL (ref 35–60)
HGB BLD-MCNC: 14.3 G/DL (ref 12–16)
HYALINE CASTS #/AREA URNS LPF: ABNORMAL /LPF
IMM GRANULOCYTES # BLD AUTO: 0.01 X10(3)/MCL (ref 0–0.04)
IMM GRANULOCYTES NFR BLD AUTO: 0.3 %
KETONES UR QL STRIP.AUTO: NEGATIVE
LDLC SERPL CALC-MCNC: 85 MG/DL (ref 50–140)
LEUKOCYTE ESTERASE UR QL STRIP.AUTO: NEGATIVE
LYMPHOCYTES # BLD AUTO: 1.29 X10(3)/MCL (ref 0.6–4.6)
LYMPHOCYTES NFR BLD AUTO: 34.8 %
MCH RBC QN AUTO: 29.5 PG (ref 27–31)
MCHC RBC AUTO-ENTMCNC: 32.1 G/DL (ref 33–36)
MCV RBC AUTO: 92.1 FL (ref 80–94)
MICROALBUMIN UR-MCNC: 29.4 UG/ML
MICROALBUMIN/CREAT RATIO PNL UR: 34.9 MG/GM CR (ref 0–30)
MONOCYTES # BLD AUTO: 0.22 X10(3)/MCL (ref 0.1–1.3)
MONOCYTES NFR BLD AUTO: 5.9 %
MUCOUS THREADS URNS QL MICRO: ABNORMAL /LPF
NEUTROPHILS # BLD AUTO: 1.98 X10(3)/MCL (ref 2.1–9.2)
NEUTROPHILS NFR BLD AUTO: 53.3 %
NITRITE UR QL STRIP.AUTO: NEGATIVE
NRBC BLD AUTO-RTO: 0 %
PH UR STRIP.AUTO: 6 [PH]
PLATELET # BLD AUTO: 170 X10(3)/MCL (ref 130–400)
PMV BLD AUTO: 10.3 FL (ref 7.4–10.4)
POTASSIUM SERPL-SCNC: 4.4 MMOL/L (ref 3.5–5.1)
PROT SERPL-MCNC: 7.6 GM/DL (ref 6.4–8.3)
PROT UR QL STRIP.AUTO: NEGATIVE
RBC # BLD AUTO: 4.84 X10(6)/MCL (ref 4.2–5.4)
RBC #/AREA URNS AUTO: ABNORMAL /HPF
RBC UR QL AUTO: NEGATIVE
SODIUM SERPL-SCNC: 139 MMOL/L (ref 136–145)
SP GR UR STRIP.AUTO: 1.02 (ref 1–1.03)
SQUAMOUS #/AREA URNS LPF: ABNORMAL /HPF
TRIGL SERPL-MCNC: 67 MG/DL (ref 37–140)
UROBILINOGEN UR STRIP-ACNC: NORMAL
VLDLC SERPL CALC-MCNC: 13 MG/DL
WBC # SPEC AUTO: 3.71 X10(3)/MCL (ref 4.5–11.5)
WBC #/AREA URNS AUTO: ABNORMAL /HPF

## 2024-02-16 PROCEDURE — 82043 UR ALBUMIN QUANTITATIVE: CPT

## 2024-02-16 PROCEDURE — 85025 COMPLETE CBC W/AUTO DIFF WBC: CPT

## 2024-02-16 PROCEDURE — 80053 COMPREHEN METABOLIC PANEL: CPT

## 2024-02-16 PROCEDURE — 36415 COLL VENOUS BLD VENIPUNCTURE: CPT

## 2024-02-16 PROCEDURE — 83036 HEMOGLOBIN GLYCOSYLATED A1C: CPT

## 2024-02-16 PROCEDURE — 80061 LIPID PANEL: CPT

## 2024-02-22 ENCOUNTER — OFFICE VISIT (OUTPATIENT)
Dept: INTERNAL MEDICINE | Facility: CLINIC | Age: 54
End: 2024-02-22

## 2024-02-22 DIAGNOSIS — E78.2 MIXED HYPERLIPIDEMIA: Chronic | ICD-10-CM

## 2024-02-22 DIAGNOSIS — E03.9 HYPOTHYROIDISM (ACQUIRED): Chronic | ICD-10-CM

## 2024-02-22 DIAGNOSIS — I10 ESSENTIAL HYPERTENSION: Chronic | ICD-10-CM

## 2024-02-22 DIAGNOSIS — G44.059 SUNCT (SHORT UNILATERAL NEURALGIFORM HEADACHE, CONJUNCTIVAL INJ/TEAR): Chronic | ICD-10-CM

## 2024-02-22 DIAGNOSIS — R74.8 ELEVATED LIVER ENZYMES: ICD-10-CM

## 2024-02-22 DIAGNOSIS — E11.65 TYPE 2 DIABETES MELLITUS WITH HYPERGLYCEMIA, WITHOUT LONG-TERM CURRENT USE OF INSULIN: Primary | Chronic | ICD-10-CM

## 2024-02-22 PROCEDURE — 99214 OFFICE O/P EST MOD 30 MIN: CPT | Mod: 95,,, | Performed by: NURSE PRACTITIONER

## 2024-02-22 RX ORDER — ATORVASTATIN CALCIUM 40 MG/1
40 TABLET, FILM COATED ORAL DAILY
Qty: 90 TABLET | Refills: 1 | Status: SHIPPED | OUTPATIENT
Start: 2024-02-22 | End: 2024-05-22 | Stop reason: SDUPTHER

## 2024-02-22 RX ORDER — LEVOTHYROXINE SODIUM 100 UG/1
100 TABLET ORAL DAILY
Qty: 90 TABLET | Refills: 1 | Status: SHIPPED | OUTPATIENT
Start: 2024-02-22 | End: 2024-05-22 | Stop reason: SDUPTHER

## 2024-02-22 RX ORDER — GLIMEPIRIDE 4 MG/1
6 TABLET ORAL
Qty: 135 TABLET | Refills: 1 | Status: SHIPPED | OUTPATIENT
Start: 2024-02-22 | End: 2024-05-22 | Stop reason: SDUPTHER

## 2024-02-22 RX ORDER — AMLODIPINE BESYLATE 10 MG/1
10 TABLET ORAL DAILY
Qty: 90 TABLET | Refills: 1 | Status: SHIPPED | OUTPATIENT
Start: 2024-02-22 | End: 2024-05-22 | Stop reason: SDUPTHER

## 2024-02-22 RX ORDER — PIOGLITAZONEHYDROCHLORIDE 30 MG/1
30 TABLET ORAL DAILY
Qty: 90 TABLET | Refills: 1 | Status: SHIPPED | OUTPATIENT
Start: 2024-02-22 | End: 2024-05-22 | Stop reason: SDUPTHER

## 2024-02-22 RX ORDER — METFORMIN HYDROCHLORIDE 1000 MG/1
1000 TABLET ORAL 2 TIMES DAILY
Qty: 180 TABLET | Refills: 1 | Status: SHIPPED | OUTPATIENT
Start: 2024-02-22 | End: 2024-05-22 | Stop reason: SDUPTHER

## 2024-02-22 RX ORDER — LISINOPRIL 20 MG/1
20 TABLET ORAL DAILY
Qty: 90 TABLET | Refills: 1 | Status: SHIPPED | OUTPATIENT
Start: 2024-02-22 | End: 2024-05-22 | Stop reason: SDUPTHER

## 2024-02-22 NOTE — PROGRESS NOTES
Patient ID: [id number]     Chief Complaint: Follow-up    HPI:     The patient location is: home  The chief complaint leading to consultation is: follow up    Visit type: audiovisual    Face to Face time with patient: 15 minutes  20 minutes of total time spent on the encounter, which includes face to face time and non-face to face time preparing to see the patient (eg, review of tests), Obtaining and/or reviewing separately obtained history, Documenting clinical information in the electronic or other health record, Independently interpreting results (not separately reported) and communicating results to the patient/family/caregiver, or Care coordination (not separately reported).     Each patient to whom he or she provides medical services by telemedicine is:  (1) informed of the relationship between the physician and patient and the respective role of any other health care provider with respect to management of the patient; and (2) notified that he or she may decline to receive medical services by telemedicine and may withdraw from such care at any time.      Scotty Moctezuma is a 53 y.o. female with diagnosis of HTN, HLD, DM2, Hypothyroidism, SUNCT Headaches, Obesity. Patient seen per virtual visit today for follow up. Patient last seen in clinic on 06/14/2023.  Patient currently prescribed Glimepiride 6 mg Qam, Metformin 1,000 mg bid, Actos 30 mg daily for DM2. Previous A1c 8.1. Patient states she checks her CBGs at home but not consistently. Patient denies hypoglycemia.   Patient currently prescribed Atorvastatin for HLD.   Patient states taking medications as prescribed, tolerating well.  Patient states mild dysuria at times x2 weeks. UA completed on 02/16/2024. Patient denies hematuria, vaginal discharge, abdominal pain, fever.   Patient denies any other acute complaints.     Patient followed by GYN clinic. Last appointment on 01/10/2024. Patient has follow up appointment scheduled for 01/15/2025.     Patient  followed by Neurology Clinic. Last appointment on 2023. Dx: SUNCT, Stroke-like symptoms, Memory Loss. Plan: continue Lisinopril 20 mg daily, Lamotrigine  mg daily. Consider MagOx and/or Riboflavin at next visit. Patient has follow up appointment scheduled for 2024.     Review of patient's allergies indicates:  No Known Allergies    Breast Cancer Screening: MMG benign on 2024  Cervical Cancer Screenin2021  Colorectal Cancer Screening: Colonoscopy on 2022 with recommended repeat in 10 years  Diabetic Eye Exam: 2021, followed by [facility]  Diabetic Foot Exam: 2023  Lung Cancer Screening: N/A  Prostate Cancer Screening: N/A  AAA Screening: N/A  Osteoporosis Screening: deferred due to age  Medicare Wellness: N/A  Immunizations:   Immunization History   Administered Date(s) Administered    COVID-19 Vaccine 2022    COVID-19, MRNA, LN-S, PF (Pfizer) (Purple Cap) 2021, 2021    Influenza - Quadrivalent - PF *Preferred* (6 months and older) 2021, 2022, 2022    Tdap 2020    Zoster 2022     Past Surgical History:   Procedure Laterality Date     SECTION      CHOLECYSTECTOMY      TUBAL LIGATION       family history includes Diabetes Mellitus in her father; Heart failure in her mother.    Social History     Socioeconomic History    Marital status:     Number of children: 3   Tobacco Use    Smoking status: Never    Smokeless tobacco: Never   Substance and Sexual Activity    Alcohol use: Not Currently     Comment: 1-2 times a year    Drug use: Never    Sexual activity: Not Currently     Partners: Male   Social History Narrative    ** Merged History Encounter **          Social Determinants of Health     Financial Resource Strain: High Risk (2024)    Overall Financial Resource Strain (CARDIA)     Difficulty of Paying Living Expenses: Hard   Food Insecurity: No Food Insecurity (2024)    Hunger Vital Sign      Worried About Running Out of Food in the Last Year: Never true     Ran Out of Food in the Last Year: Never true   Transportation Needs: Unmet Transportation Needs (2/22/2024)    PRAPARE - Transportation     Lack of Transportation (Medical): Yes     Lack of Transportation (Non-Medical): Yes   Physical Activity: Insufficiently Active (2/22/2024)    Exercise Vital Sign     Days of Exercise per Week: 1 day     Minutes of Exercise per Session: 60 min   Stress: No Stress Concern Present (2/22/2024)    [employer] - Occupational Stress Questionnaire     Feeling of Stress : Only a little   Social Connections: Moderately Integrated (2/22/2024)    Social Connection and Isolation Panel [NHANES]     Frequency of Communication with Friends and Family: More than three times a week     Frequency of Social Gatherings with Friends and Family: Never     Attends Tenriism Services: More than 4 times per year     Active Member of Clubs or Organizations: No     Attends Club or Organization Meetings: Patient declined     Marital Status:    Housing Stability: Low Risk  (2/22/2024)    Housing Stability Vital Sign     Unable to Pay for Housing in the Last Year: No     Number of Places Lived in the Last Year: 1     Unstable Housing in the Last Year: No     Current Outpatient Medications   Medication Instructions    amLODIPine (NORVASC) 10 mg, Oral, Daily    atorvastatin (LIPITOR) 40 mg, Oral, Daily    EUTHYROX 100 mcg, Oral, Daily    glimepiride (AMARYL) 6 mg, Oral, With breakfast    lamotrigine XR (LAMICTAL XR) 100 mg, Oral, Daily    lisinopriL (PRINIVIL,ZESTRIL) 20 mg, Oral, Daily    metFORMIN (GLUCOPHAGE) 1,000 mg, Oral, 2 times daily    multivitamin (THERAGRAN) per tablet 1 tablet, Oral, Daily, For women over 50 years    ondansetron (ZOFRAN-ODT) 4 mg, Oral, Every 8 hours PRN    pioglitazone (ACTOS) 30 mg, Oral, Daily       Subjective:     Review of Systems   Constitutional: Negative.    HENT: Negative.      Eyes: Negative.    Respiratory: Negative.     Cardiovascular: Negative.    Gastrointestinal: Negative.    Endocrine: Negative.    Genitourinary: Negative.    Musculoskeletal: Negative.    Skin: Negative.    Allergic/Immunologic: Negative.    Neurological: Negative.    Hematological: Negative.    Psychiatric/Behavioral: Negative.         Objective:     Visit Vitals  LMP 10/30/2020 (Exact Date)       Physical Exam  Neurological:      Mental Status: She is alert and oriented to person, place, and time.   Psychiatric:         Mood and Affect: Mood normal.       Labs Reviewed:     Hematology:  Lab Results   Component Value Date    WBC 3.71 (L) 02/16/2024    HGB 14.3 02/16/2024    HCT 44.6 02/16/2024     02/16/2024     Chemistry:  Lab Results   Component Value Date     02/16/2024    K 4.4 02/16/2024    CHLORIDE 104 02/16/2024    BUN 13.0 02/16/2024    CREATININE 1.06 (H) 02/16/2024    EGFRNORACEVR >60 02/16/2024    GLUCOSE 129 (H) 02/16/2024    CALCIUM 9.8 02/16/2024    ALKPHOS 119 02/16/2024    LABPROT 7.6 02/16/2024    ALBUMIN 4.0 02/16/2024    BILIDIR 0.1 12/03/2021    IBILI 0.30 12/03/2021    AST 36 (H) 02/16/2024    ALT 65 (H) 02/16/2024    MG 2.1 11/04/2019    PHOS 3.7 08/12/2019    [id number] 47.7 11/11/2022      Lab Results   Component Value Date    HGBA1C 7.9 (H) 02/16/2024      Lipid Panel:  Lab Results   Component Value Date    CHOL 165 02/16/2024    HDL 67 (H) 02/16/2024    LDL 85.00 02/16/2024    TRIG 67 02/16/2024    TOTALCHOLEST 2 02/16/2024      Thyroid:  Lab Results   Component Value Date    TSH 1.782 05/01/2023    T3FREE 1.96 (L) 03/03/2020      Urine:  Lab Results   Component Value Date    COLORUA Light-Yellow 02/16/2024    APPEARANCEUA Clear 02/16/2024    SGUA 1.016 02/16/2024    PHUA 6.0 02/16/2024    PROTEINUA Negative 02/16/2024    GLUCOSEUA Normal 02/16/2024    KETONESUA Negative 02/16/2024    BLOODUA Negative 02/16/2024    NITRITESUA Negative 02/16/2024    LEUKOCYTESUR Negative  02/16/2024    RBCUA 0-5 02/16/2024    WBCUA 0-5 02/16/2024    BACTERIA Trace (A) 02/16/2024    SQEPUA Few (A) 02/16/2024    HYALINECASTS None Seen 02/16/2024    CREATRANDUR 84.2 02/16/2024        Assessment:       ICD-10-CM ICD-9-CM   1. Type 2 diabetes mellitus with hyperglycemia, without long-term current use of insulin  E11.65 250.00     790.29   2. Mixed hyperlipidemia  E78.2 272.2   3. Essential hypertension  I10 401.9   4. Elevated liver enzymes  R74.8 790.5   5. Hypothyroidism (acquired)  E03.9 244.9   6. SUNCT (short unilateral neuralgiform headache, conjunctival inj/tear)  G44.059 339.05       Plan:     1. Type 2 diabetes mellitus with hyperglycemia, without long-term current use of insulin  Continue Metformin 1,000 mg bid, Glimepiride 6 mg daily, Actos 30 mg daily  Encouraged home CBG monitoring.  Hypoglycemic episodes: denies  There is no height or weight on file to calculate BMI.  Hemoglobin A1c   Date Value Ref Range Status   02/16/2024 7.9 (H) <=7.0 % Final     Results for orders placed or performed in visit on 02/16/24   Microalbumin/Creatinine Ratio, Urine   Result Value Ref Range    Urine Microalbumin 29.4 <=30.0 ug/ml    Urine Creatinine 84.2 45.0 - 106.0 mg/dL    Microalbumin Creatinine Ratio 34.9 (H) 0.0 - 30.0 mg/gm Cr   On Atorvastatin  On Lisinopril  Weight Loss Encouraged  ADA Diet  - Comprehensive Metabolic Panel; Future  - Hemoglobin A1C; Future    2. Mixed hyperlipidemia  Continue Atorvastatin 40 mg daily  Weight loss encouraged  Low fat/high fiber diet  Increase physical activity  Cholesterol Total   Date Value Ref Range Status   02/16/2024 165 <=200 mg/dL Final     HDL Cholesterol   Date Value Ref Range Status   02/16/2024 67 (H) 35 - 60 mg/dL Final     Triglyceride   Date Value Ref Range Status   02/16/2024 67 37 - 140 mg/dL Final     LDL Cholesterol   Date Value Ref Range Status   02/16/2024 85.00 50.00 - 140.00 mg/dL Final     3. Essential hypertension  There were no vitals filed for  this visit.   Results for orders placed or performed in visit on 02/16/24   Microalbumin/Creatinine Ratio, Urine   Result Value Ref Range    Urine Microalbumin 29.4 <=30.0 ug/ml    Urine Creatinine 84.2 45.0 - 106.0 mg/dL    Microalbumin Creatinine Ratio 34.9 (H) 0.0 - 30.0 mg/gm Cr     No results found for this or any previous visit.   Continue Amlodipine 10 mg daily, Lisinopril 10 mg daily  DASH diet  Encouraged home blood pressure monitoring    4. Elevated liver enzymes  AST: 36  ALT: 65  Alk Phos: 119  Patient denies abdominal pain  Hep C non-reactive     5. Hypothyroidism (acquired)  TSH level: 1.7  Euthyrox 100 mcg daily    6. SUNCT (short unilateral neuralgiform headache, conjunctival inj/tear)  Followed by Neurology  Continue Lamotrigine as prescribed       Follow up in about 3 months (around 5/22/2024) for Labs. In addition to their scheduled follow up, the patient has also been instructed to follow up on as needed basis.     Catrina Quiroz, BOOGIE

## 2024-02-28 ENCOUNTER — TELEPHONE (OUTPATIENT)
Dept: INTERNAL MEDICINE | Facility: CLINIC | Age: 54
End: 2024-02-28

## 2024-04-09 ENCOUNTER — HOSPITAL ENCOUNTER (EMERGENCY)
Facility: HOSPITAL | Age: 54
Discharge: HOME | End: 2024-04-09
Attending: STUDENT IN AN ORGANIZED HEALTH CARE EDUCATION/TRAINING PROGRAM

## 2024-04-09 VITALS
SYSTOLIC BLOOD PRESSURE: 135 MMHG | TEMPERATURE: 98 F | OXYGEN SATURATION: 100 % | WEIGHT: 229.25 LBS | HEART RATE: 85 BPM | RESPIRATION RATE: 18 BRPM | BODY MASS INDEX: 41.94 KG/M2 | DIASTOLIC BLOOD PRESSURE: 76 MMHG

## 2024-04-09 DIAGNOSIS — R07.9 CHEST PAIN: ICD-10-CM

## 2024-04-09 DIAGNOSIS — J06.9 UPPER RESPIRATORY TRACT INFECTION, UNSPECIFIED TYPE: Primary | ICD-10-CM

## 2024-04-09 LAB
ALBUMIN SERPL-MCNC: 3.7 G/DL (ref 3.5–5)
ALBUMIN/GLOB SERPL: 1.1 RATIO (ref 1.1–2)
ALP SERPL-CCNC: 83 UNIT/L (ref 40–150)
ALT SERPL-CCNC: 29 UNIT/L (ref 0–55)
AST SERPL-CCNC: 26 UNIT/L (ref 5–34)
BASOPHILS # BLD AUTO: 0.02 X10(3)/MCL
BASOPHILS NFR BLD AUTO: 0.5 %
BILIRUB SERPL-MCNC: 0.3 MG/DL
BUN SERPL-MCNC: 14.4 MG/DL (ref 9.8–20.1)
CALCIUM SERPL-MCNC: 9.9 MG/DL (ref 8.4–10.2)
CHLORIDE SERPL-SCNC: 104 MMOL/L (ref 98–107)
CO2 SERPL-SCNC: 27 MMOL/L (ref 22–29)
CREAT SERPL-MCNC: 1.35 MG/DL (ref 0.55–1.02)
EOSINOPHIL # BLD AUTO: 0.2 X10(3)/MCL (ref 0–0.9)
EOSINOPHIL NFR BLD AUTO: 4.6 %
ERYTHROCYTE [DISTWIDTH] IN BLOOD BY AUTOMATED COUNT: 14 % (ref 11.5–17)
FLUAV AG UPPER RESP QL IA.RAPID: NOT DETECTED
FLUBV AG UPPER RESP QL IA.RAPID: NOT DETECTED
GFR SERPLBLD CREATININE-BSD FMLA CKD-EPI: 47 MLS/MIN/1.73/M2
GLOBULIN SER-MCNC: 3.5 GM/DL (ref 2.4–3.5)
GLUCOSE SERPL-MCNC: 445 MG/DL (ref 74–100)
HCT VFR BLD AUTO: 39.7 % (ref 37–47)
HGB BLD-MCNC: 13.4 G/DL (ref 12–16)
HOLD SPECIMEN: NORMAL
HOLD SPECIMEN: NORMAL
IMM GRANULOCYTES # BLD AUTO: 0.02 X10(3)/MCL (ref 0–0.04)
IMM GRANULOCYTES NFR BLD AUTO: 0.5 %
LYMPHOCYTES # BLD AUTO: 0.7 X10(3)/MCL (ref 0.6–4.6)
LYMPHOCYTES NFR BLD AUTO: 16.1 %
MCH RBC QN AUTO: 31.4 PG (ref 27–31)
MCHC RBC AUTO-ENTMCNC: 33.8 G/DL (ref 33–36)
MCV RBC AUTO: 93 FL (ref 80–94)
MONOCYTES # BLD AUTO: 0.37 X10(3)/MCL (ref 0.1–1.3)
MONOCYTES NFR BLD AUTO: 8.5 %
NEUTROPHILS # BLD AUTO: 3.05 X10(3)/MCL (ref 2.1–9.2)
NEUTROPHILS NFR BLD AUTO: 69.8 %
NRBC BLD AUTO-RTO: 0 %
PLATELET # BLD AUTO: 184 X10(3)/MCL (ref 130–400)
PMV BLD AUTO: 10.5 FL (ref 7.4–10.4)
POTASSIUM SERPL-SCNC: 4.2 MMOL/L (ref 3.5–5.1)
PROT SERPL-MCNC: 7.2 GM/DL (ref 6.4–8.3)
RBC # BLD AUTO: 4.27 X10(6)/MCL (ref 4.2–5.4)
SARS-COV-2 RNA RESP QL NAA+PROBE: NOT DETECTED
SODIUM SERPL-SCNC: 139 MMOL/L (ref 136–145)
TROPONIN I SERPL-MCNC: <0.01 NG/ML (ref 0–0.04)
WBC # SPEC AUTO: 4.36 X10(3)/MCL (ref 4.5–11.5)

## 2024-04-09 PROCEDURE — 85025 COMPLETE CBC W/AUTO DIFF WBC: CPT | Performed by: NURSE PRACTITIONER

## 2024-04-09 PROCEDURE — 99285 EMERGENCY DEPT VISIT HI MDM: CPT | Mod: 25

## 2024-04-09 PROCEDURE — 80053 COMPREHEN METABOLIC PANEL: CPT | Performed by: NURSE PRACTITIONER

## 2024-04-09 PROCEDURE — 93005 ELECTROCARDIOGRAM TRACING: CPT

## 2024-04-09 PROCEDURE — 84484 ASSAY OF TROPONIN QUANT: CPT | Performed by: NURSE PRACTITIONER

## 2024-04-09 PROCEDURE — 25000003 PHARM REV CODE 250: Performed by: NURSE PRACTITIONER

## 2024-04-09 PROCEDURE — 0240U COVID/FLU A&B PCR: CPT | Performed by: NURSE PRACTITIONER

## 2024-04-09 RX ORDER — CETIRIZINE HYDROCHLORIDE 10 MG/1
10 TABLET ORAL DAILY
Qty: 14 TABLET | Refills: 0 | Status: SHIPPED | OUTPATIENT
Start: 2024-04-09 | End: 2024-05-06

## 2024-04-09 RX ORDER — GUAIFENESIN AND DEXTROMETHORPHAN HYDROBROMIDE 10; 100 MG/5ML; MG/5ML
10 SYRUP ORAL
Status: COMPLETED | OUTPATIENT
Start: 2024-04-09 | End: 2024-04-09

## 2024-04-09 RX ORDER — BENZONATATE 200 MG/1
200 CAPSULE ORAL 3 TIMES DAILY PRN
Qty: 30 CAPSULE | Refills: 0 | Status: SHIPPED | OUTPATIENT
Start: 2024-04-09 | End: 2024-04-19

## 2024-04-09 RX ADMIN — GUAIFENESIN AND DEXTROMETHORPHAN 10 ML: 100; 10 SYRUP ORAL at 07:04

## 2024-04-09 NOTE — ED PROVIDER NOTES
Encounter Date: 2024       History     Chief Complaint   Patient presents with    Shortness of Breath     CO PLEURITIC CP/SOB SINCE YESTERDAY.  REPORTS RECENT EXPOSURE TO PNEUMONIA.  NO FEVER.       Pt is a 53 y.o. female who presents to the [facility] ED complaining of cough, centralized chest discomfort which has been present since yesterday. Hx of HTN, DM, and thyroid disease. Pt anxious over viral infection exposure due to her  currently being sick. Reports she has been caring for him and now he has pneumonia and she fears that he has gotten her sick.      Review of patient's allergies indicates:  No Known Allergies  Past Medical History:   Diagnosis Date    Diabetes mellitus, type 2     Essential hypertension 2022    Headache     High cholesterol     Thyroid disease      Past Surgical History:   Procedure Laterality Date     SECTION      CHOLECYSTECTOMY      TUBAL LIGATION       Family History   Problem Relation Name Age of Onset    Heart failure Mother      Diabetes Mellitus Father       Social History     Tobacco Use    Smoking status: Never    Smokeless tobacco: Never   Substance Use Topics    Alcohol use: Not Currently     Comment: 1-2 times a year    Drug use: Never     Review of Systems   Constitutional:  Negative for chills, diaphoresis, fatigue and fever.   HENT:  Positive for rhinorrhea and sinus pressure. Negative for facial swelling, sinus pain, sore throat and trouble swallowing.    Respiratory:  Positive for cough. Negative for chest tightness, shortness of breath and wheezing.    Cardiovascular:  Positive for chest pain. Negative for palpitations and leg swelling.   Gastrointestinal:  Negative for abdominal pain, diarrhea, nausea and vomiting.   Genitourinary:  Negative for dysuria, flank pain, frequency, hematuria and urgency.   Musculoskeletal:  Negative for arthralgias, back pain, joint swelling and myalgias.   Skin:  Negative for color change and rash.   Neurological:   Negative for dizziness, syncope, weakness and light-headedness.   Hematological:  Does not bruise/bleed easily.   All other systems reviewed and are negative.      Physical Exam     Initial Vitals [04/09/24 1642]   BP Pulse Resp Temp SpO2   (!) 168/88 93 18 98.1 °F (36.7 °C) 100 %      MAP       --         Physical Exam    Nursing note and vitals reviewed.  Constitutional: She appears well-developed and well-nourished.   HENT:   Head: Normocephalic and atraumatic.   Nose: Nose normal.   Mouth/Throat: Oropharynx is clear and moist.   Eyes: Conjunctivae and EOM are normal. Pupils are equal, round, and reactive to light.   Neck: Neck supple.   Normal range of motion.  Cardiovascular:  Normal rate, regular rhythm, normal heart sounds and intact distal pulses.           Pulmonary/Chest: Effort normal and breath sounds normal. No respiratory distress. She has no wheezes. She has no rhonchi. She has no rales. She exhibits tenderness. She exhibits no crepitus, no edema and no deformity.     Dry cough present.     Abdominal: Abdomen is soft and flat. Bowel sounds are normal. She exhibits no distension. There is no abdominal tenderness. There is no rebound, no guarding, no tenderness at McBurney's point and negative Barbosa's sign. negative psoas sign  Musculoskeletal:         General: Normal range of motion.      Cervical back: Normal range of motion and neck supple.     Neurological: She is alert and oriented to person, place, and time. She has normal strength and normal reflexes.   Skin: Skin is warm and dry. Capillary refill takes less than 2 seconds.   Psychiatric: She has a normal mood and affect. Her speech is normal and behavior is normal. Judgment and thought content normal.         ED Course   Procedures  Labs Reviewed   COMPREHENSIVE METABOLIC PANEL - Abnormal; Notable for the following components:       Result Value    Glucose Level 445 (*)     Creatinine 1.35 (*)     All other components within normal limits   CBC  WITH DIFFERENTIAL - Abnormal; Notable for the following components:    WBC 4.36 (*)     MCH 31.4 (*)     MPV 10.5 (*)     All other components within normal limits   TROPONIN I - Normal   COVID/FLU A&B PCR - Normal    Narrative:     The Xpert Xpress SARS-CoV-2/FLU/RSV plus is a rapid, multiplexed real-time PCR test intended for the simultaneous qualitative detection and differentiation of SARS-CoV-2, Influenza A, Influenza B, and respiratory syncytial virus (RSV) viral RNA in either nasopharyngeal swab or nasal swab specimens.         CBC W/ AUTO DIFFERENTIAL    Narrative:     The following orders were created for panel order CBC auto differential.  Procedure                               Abnormality         Status                     ---------                               -----------         ------                     CBC with Differential[[id number]]       Abnormal            Final result                 Please view results for these tests on the individual orders.   EXTRA TUBES    Narrative:     The following orders were created for panel order EXTRA TUBES.  Procedure                               Abnormality         Status                     ---------                               -----------         ------                     Light Blue Top Hold[[id number]]                             Final result               Red Top Hold[[id number]]                                    Final result                 Please view results for these tests on the individual orders.   LIGHT BLUE TOP HOLD   RED TOP HOLD        ECG Results              EKG 12-lead (Shortness of Breath) Age > 50 (Final result)        Collection Time Result Time QRS Duration OHS QTC Calculation    04/09/24 16:49:30 04/10/24 17:43:47 82 455                     Final result by Interface, Lab In [facility] (04/10/24 17:43:53)                   Narrative:    Test Reason : R06.02,    Vent. Rate : 094 BPM     Atrial Rate : 094 BPM     P-R Int : 190 ms           QRS Dur : 082 ms      QT Int : 364 ms       P-R-T Axes : 063 036 028 degrees     QTc Int : 455 ms    Normal sinus rhythm  Low voltage QRS  Borderline Abnormal ECG  No previous ECGs available  Confirmed by Andres Yoon MD ([id number]) on 4/10/2024 5:43:45 PM    Referred By:             Confirmed By:Andres Yoon MD                                     EKG 12-LEAD (Final result)  Result time 04/12/24 11:54:54      Final result by Unknown User (04/12/24 11:54:54)                                      Imaging Results              X-Ray Chest 1 View (Final result)  Result time 04/09/24 17:41:50      Final result by aTiwo Nash MD (04/09/24 17:41:50)                   Impression:      Chronic changes seen no acute process      Electronically signed by: Kennedy Nash  Date:    04/09/2024  Time:    17:41               Narrative:    EXAMINATION:  XR CHEST 1 VIEW    CLINICAL HISTORY:  Chest pain, unspecified    TECHNIQUE:  Single frontal view of the chest was performed.    COMPARISON:  10/23/2020    FINDINGS:  There are chronic appearing lung changes seen bilaterally. No evidence of acute infiltrate is seen. No mass is seen. No lesion is seen. No pleural effusion is seen. The heart appears normal. The aorta appears normal. The bones and joints show no acute abnormality.                                       Medications   dextromethorphan-guaiFENesin  mg/5 ml liquid 10 mL (10 mLs Oral Given 4/9/24 1927)     Medical Decision Making  Differential:  URI  COVID  Infleunzqa      Amount and/or Complexity of Data Reviewed  Labs: ordered.  Radiology: ordered.    Risk  OTC drugs.  Prescription drug management.               ED Course as of 04/15/24 1416   Tue Apr 09, 2024 1919 Given strict ED return precautions. I have spoken with the patient and/or caregivers. I have explained the patient's condition, diagnoses and treatment plan based on the information available to me at this time. I have answered the patient's  and/or caregiver's questions and addressed any concerns. The patient and/or caregivers have as good an understanding of the patient's diagnosis, condition and treatment plan as can be expected at this point. The vital signs have been stable. The patient's condition is stable and appropriate for discharge from the emergency department.      The patient will pursue further outpatient evaluation with the primary care physician or other designated or consulting physician as outlined in the discharge instructions. The patient and/or caregivers are agreeable to this plan of care and follow-up instructions have been explained in detail. The patient and/or caregivers have received these instructions in written format and have expressed an understanding of the discharge instructions. The patient and/or caregivers are aware that any significant change in condition or worsening of symptoms should prompt an immediate return to this or the closest emergency department or a call to 911.   [JA]      ED Course User Index  [JA] Jason Salomon Jr., FNP                           Clinical Impression:  Final diagnoses:  [R07.9] Chest pain  [J06.9] Upper respiratory tract infection, unspecified type (Primary)          ED Disposition Condition    Discharge Stable          ED Prescriptions       Medication Sig Dispense Start Date End Date Auth. Provider    cetirizine (ZYRTEC) 10 MG tablet Take 1 tablet (10 mg total) by mouth once daily. for 14 days 14 tablet 4/9/2024 4/23/2024 Jason Salomon Jr., FNP    benzonatate (TESSALON) 200 MG capsule Take 1 capsule (200 mg total) by mouth 3 (three) times daily as needed for Cough (Cough). 30 capsule 4/9/2024 4/19/2024 Jason Salomon Jr., FNP          Follow-up Information       Follow up With Specialties Details Why Contact Info    Catrina Quiroz FNP Family Medicine In 3 days  [address] [facility] [address]  [phone]      [facility] Emergency Medicine In  3 days As needed, If symptoms worsen [address] [facility] [address]  [phone]             Jason Salomon Jr., [facility] [place]  04/09/24 1922       Jason Salomon Jr., [facility] [place]  04/15/24 1419

## 2024-04-09 NOTE — ED NOTES
SOB, PAIN WHEN BREATHING, AND CHILLS SINCE YESTERDAY. [id number]% ON ROOM AIR. AFEBRILE AT THIS TIME. JORDAN GONZALEZ

## 2024-04-10 LAB
OHS QRS DURATION: 82 MS
OHS QTC CALCULATION: 455 MS

## 2024-04-10 NOTE — DISCHARGE INSTRUCTIONS
Follow up with your primary care physician in 3-5 days for follow up evaluation.  Take medication as prescribed.  Increase oral fluids.  Use tylenol or motrin every 4-6 hours for fever of 101 or greater.

## 2024-05-06 ENCOUNTER — OFFICE VISIT (OUTPATIENT)
Dept: NEUROLOGY | Facility: CLINIC | Age: 54
End: 2024-05-06

## 2024-05-06 DIAGNOSIS — E66.01 CLASS 3 SEVERE OBESITY WITH SERIOUS COMORBIDITY AND BODY MASS INDEX (BMI) OF 40.0 TO 44.9 IN ADULT, UNSPECIFIED OBESITY TYPE: ICD-10-CM

## 2024-05-06 DIAGNOSIS — F41.9 ANXIETY: ICD-10-CM

## 2024-05-06 DIAGNOSIS — G44.059 SUNCT (SHORT UNILATERAL NEURALGIFORM HEADACHE, CONJUNCTIVAL INJ/TEAR): Primary | Chronic | ICD-10-CM

## 2024-05-06 PROBLEM — E66.813 CLASS 3 SEVERE OBESITY WITH SERIOUS COMORBIDITY AND BODY MASS INDEX (BMI) OF 40.0 TO 44.9 IN ADULT: Status: ACTIVE | Noted: 2024-05-06

## 2024-05-06 PROCEDURE — G2211 COMPLEX E/M VISIT ADD ON: HCPCS | Mod: 95,,, | Performed by: NURSE PRACTITIONER

## 2024-05-06 PROCEDURE — 99214 OFFICE O/P EST MOD 30 MIN: CPT | Mod: 95,,, | Performed by: NURSE PRACTITIONER

## 2024-05-06 RX ORDER — AMOXICILLIN 500 MG/1
500 CAPSULE ORAL 2 TIMES DAILY
COMMUNITY
Start: 2024-04-24

## 2024-05-06 RX ORDER — ALBUTEROL SULFATE 90 UG/1
2 AEROSOL, METERED RESPIRATORY (INHALATION) EVERY 6 HOURS PRN
COMMUNITY
Start: 2024-04-24

## 2024-05-06 NOTE — PROGRESS NOTES
"[facility] Neurology Follow Up Telemedicine Visit Note    Initial Visit Date: 11/9/2022  Last Visit Date: 9/11/2023  Current Visit Date:  05/06/2024    This is a real-time audio/video visit that was performed with the originating site at patient's home and the distant site, [facility]. Verbal consent to participate in interactive audio & video visit was obtained.    I discussed with the patient regarding the nature of our telehealth visits, that:    - Our sessions are not being recorded and that personal health information is protected  - Provider would evaluate the patient and recommend diagnostics and treatments based on my assessment  - [facility] will provide follow up care in person if/when the patient needs it.     Chief Complaint:     Chief Complaint   Patient presents with    Headache     Patient denies any SUNCT headaches, occasional allergy headaches. States this is the best she has felt in years. Current medications help.     History of Present Illness:      This is 53 y.o. female with history of hypertension, hyperlipidemia, diabetes type 2, anxiety, hypothyroidism, obesity, transaminitis who was referred for SUNCT headache.   Also complaining of memory loss. During last visit, LTG  mg daily and lisinopril 20 mg PO daily were continued.     Today, Pt states she is doing very well. Previous HA that started on L parietal/crown area that radiates to both eyes and down L side of neck, accompanied by sensitivity to smells, sound, nausea, believes were related to stress as she is caring for her sick spouse. SUNCT headaches are stable w/LTG. Rarely has "genuine" migraine w/nausea, but not enough to take medication. Has changed diet to renal diet as that is what her spouse is on and has noticed a 10 pound weight loss.    Age of Onset : 18 years old      Headache Description:   left retro-orbital, sharp, stabbing, seconds, " lasting days, with OS lacrimation. Unclear if there is ptosis, conjunctival injection, miosis/mydriasis:  On occasion.  Right frontal burning sensation: resolved      Frequency: as above.      Provocation Factors:  Pollen.     Risk Factors  - Family history of headache disorder: Yes son, mother, and sister with headache.   - History of focal CNS lesions: No  - History of CNS infections: No  - History head trauma: No  - History of underlying mood disorder: No  - History of sleep disorder: No   - Recreational drug use: No  - Tobacco use: No  - Alcohol use: Yes occasional  - Weight fluctuation: Not Applicable  - Isotretinoin or Tetracycline use:  Not Applicable  - Family planning and contraceptive use: Not Applicable    Medications:     Current Prophylactic  Lisinopril 20 mg once a day   Lamotrigine  mg once a day (2/13/2023 to 3/1/2023) restarted (9/11/2023 - present) effective  Amlodipine 10mg PO daily (8/1/2023 - present)    Current Abortive  Tylenol - ineffective     Prior Prophylactic  Topiramate 100 mg once at night - ran out of refills  Gabapentin 100 mg twice a day (? - 5/31/2023) - ineffective    Prior Abortive  Ibuprofen PRN - ineffective     Devices:     - VNS:  - TNS  - TMS:     Procedures:     - Botox:  - PSG block:   - Occipital nerve block:     Labs:     Results for orders placed or performed during the hospital encounter of 04/09/24   Comprehensive metabolic panel   Result Value Ref Range    Sodium Level 139 136 - 145 mmol/L    Potassium Level 4.2 3.5 - 5.1 mmol/L    Chloride 104 98 - 107 mmol/L    Carbon Dioxide 27 22 - 29 mmol/L    Glucose Level 445 (H) 74 - 100 mg/dL    Blood Urea Nitrogen 14.4 9.8 - 20.1 mg/dL    Creatinine 1.35 (H) 0.55 - 1.02 mg/dL    Calcium Level Total 9.9 8.4 - 10.2 mg/dL    Protein Total 7.2 6.4 - 8.3 gm/dL    Albumin Level 3.7 3.5 - 5.0 g/dL    Globulin 3.5 2.4 - 3.5 gm/dL    Albumin/Globulin Ratio 1.1 1.1 - 2.0 ratio    Bilirubin Total 0.3 <=1.5 mg/dL    Alkaline  Phosphatase 83 40 - 150 unit/L    Alanine Aminotransferase 29 0 - 55 unit/L    Aspartate Aminotransferase 26 5 - 34 unit/L    eGFR 47 mls/min/1.73/m2   Troponin I   Result Value Ref Range    Troponin-I <0.010 0.000 - 0.045 ng/mL   COVID/FLU A&B PCR   Result Value Ref Range    Influenza A PCR Not Detected Not Detected    Influenza B PCR Not Detected Not Detected    SARS-CoV-2 PCR Not Detected Not Detected, Negative   CBC with Differential   Result Value Ref Range    WBC 4.36 (L) 4.50 - 11.50 x10(3)/mcL    RBC 4.27 4.20 - 5.40 x10(6)/mcL    Hgb 13.4 12.0 - 16.0 g/dL    Hct 39.7 37.0 - 47.0 %    MCV 93.0 80.0 - 94.0 fL    MCH 31.4 (H) 27.0 - 31.0 pg    MCHC 33.8 33.0 - 36.0 g/dL    RDW 14.0 11.5 - 17.0 %    Platelet 184 130 - 400 x10(3)/mcL    MPV 10.5 (H) 7.4 - 10.4 fL    Neut % 69.8 %    Lymph % 16.1 %    Mono % 8.5 %    Eos % 4.6 %    Basophil % 0.5 %    Lymph # 0.70 0.6 - 4.6 x10(3)/mcL    Neut # 3.05 2.1 - 9.2 x10(3)/mcL    Mono # 0.37 0.1 - 1.3 x10(3)/mcL    Eos # 0.20 0 - 0.9 x10(3)/mcL    Baso # 0.02 <=0.2 x10(3)/mcL    IG# 0.02 0 - 0.04 x10(3)/mcL    IG% 0.5 %    NRBC% 0.0 %   Light Blue Top Hold   Result Value Ref Range    Extra Tube Hold for add-ons.    Red Top Hold   Result Value Ref Range    Extra Tube Hold for add-ons.    EKG 12-lead (Shortness of Breath) Age > 50   Result Value Ref Range    QRS Duration 82 ms    [facility] QTC Calculation 455 ms       Studies:     - MRI Brain with and without contrast August 12, 2019:  I have reviewed the study independently and with the patient.  Unremarkable.  - MRA Head w/o Myron:   - MRV Head w/o Myron:   - NCHCT:  - Lumbar Puncture:    Review of Systems:   AS per HPI  All other systems reviewed and are negative.    Physical Exams:     Physical Exam  Nursing note reviewed.   Constitutional:       Appearance: Normal appearance.   HENT:      Head: Normocephalic.      Right Ear: External ear normal.      Left Ear: External ear normal.      Nose: Nose normal.      Mouth/Throat:      " Mouth: Mucous membranes are moist.   Pulmonary:      Effort: Pulmonary effort is normal.   Abdominal:      General: There is no distension.      Tenderness: There is no guarding.      Comments: round   Musculoskeletal:         General: Normal range of motion.      Cervical back: Normal range of motion.   Skin:     Findings: No lesion or rash.   Neurological:      Mental Status: She is alert.     Telemedicine Comprehensive Neurological Exam:  Mental Status: Alert Oriented to Self, Date, and Place. Comprehension wnl. No dysarthria.   CN II - XII: No ptosis OU, EOMI without nystagmus, Hearing grossly intact, Face Symmetric, Tongue and Uvula midline, Trapezius symmetric bilateral.   Motor: no abnormal involuntary or voluntary movements, Fine finger movements wnl b/l, No pronator drift.   Sensory: unable to assess.  Reflexes: unable to assess.   Cerebellar: RAHM wnl b/l.  Romberg: absent.   Gait: normal, bilat arm swing intact    Assessment:     This is 53 y.o. female with history of hypertension, hyperlipidemia, diabetes type 2, anxiety, hypothyroidism, obesity, transaminitis who was referred for SUNCT headache. States she is doing well on LTG XR, denies SUNCT HA. Rarely has migraine, does not require abortive therapy. Previous "new" HA believes were related to increased stress due to taking care of sick spouse. Sleeping well, anxiety controlled, learned to play piano last year.    Problem List Items Addressed This Visit          Neuro    SUNCT (short unilateral neuralgiform headache, conjunctival inj/tear) - Primary (Chronic)       Psychiatric    Anxiety       Endocrine    Class 3 severe obesity with serious comorbidity and body mass index (BMI) of 40.0 to 44.9 in adult     Plan:     [] c/w Lisinopril 20 mg once a day via PCP  [] c/w Lamotrigine XR 100mg daily for SUNCT headache     RTC 6 months - TM okay    Headache education provided: good sleep hygiene and 7 hours of sleep per night, stress management, medication " overuse education provided. Using more 3 OTC per week may worsen headaches, high intensity interval training has shown to reduce headache frequency. Low carb, high protein has shown to reduce headache frequency. Patient is instructed in keep headache diary.     I have explained the treatment plan, diagnosis, and prognosis to patient. All questions are answered to the best of my knowledge.     Face to face time 30 minutes, including documentation, chart review, counseling, education, review of test results, relevant medical records, and coordination of care.     Nannette Burt, GOLDY-C  General Neurology    05/06/2024

## 2024-05-22 ENCOUNTER — OFFICE VISIT (OUTPATIENT)
Dept: INTERNAL MEDICINE | Facility: CLINIC | Age: 54
End: 2024-05-22

## 2024-05-22 ENCOUNTER — LAB VISIT (OUTPATIENT)
Dept: LAB | Facility: HOSPITAL | Age: 54
End: 2024-05-22
Attending: NURSE PRACTITIONER

## 2024-05-22 ENCOUNTER — CLINICAL SUPPORT (OUTPATIENT)
Dept: INTERNAL MEDICINE | Facility: CLINIC | Age: 54
End: 2024-05-22
Attending: NURSE PRACTITIONER

## 2024-05-22 VITALS
SYSTOLIC BLOOD PRESSURE: 122 MMHG | HEART RATE: 72 BPM | RESPIRATION RATE: 18 BRPM | WEIGHT: 224 LBS | DIASTOLIC BLOOD PRESSURE: 67 MMHG | BODY MASS INDEX: 41.22 KG/M2 | HEIGHT: 62 IN | TEMPERATURE: 98 F

## 2024-05-22 DIAGNOSIS — Z13.5 DIABETIC RETINOPATHY SCREENING: ICD-10-CM

## 2024-05-22 DIAGNOSIS — E78.2 MIXED HYPERLIPIDEMIA: Chronic | ICD-10-CM

## 2024-05-22 DIAGNOSIS — Z13.5 DIABETIC RETINOPATHY SCREENING: Primary | ICD-10-CM

## 2024-05-22 DIAGNOSIS — E11.65 TYPE 2 DIABETES MELLITUS WITH HYPERGLYCEMIA, WITHOUT LONG-TERM CURRENT USE OF INSULIN: Primary | Chronic | ICD-10-CM

## 2024-05-22 DIAGNOSIS — I10 ESSENTIAL HYPERTENSION: Chronic | ICD-10-CM

## 2024-05-22 DIAGNOSIS — E66.01 CLASS 3 SEVERE OBESITY DUE TO EXCESS CALORIES WITH SERIOUS COMORBIDITY AND BODY MASS INDEX (BMI) OF 40.0 TO 44.9 IN ADULT: ICD-10-CM

## 2024-05-22 DIAGNOSIS — E11.65 TYPE 2 DIABETES MELLITUS WITH HYPERGLYCEMIA, WITHOUT LONG-TERM CURRENT USE OF INSULIN: Chronic | ICD-10-CM

## 2024-05-22 DIAGNOSIS — R01.1 MURMUR, CARDIAC: ICD-10-CM

## 2024-05-22 LAB
ALBUMIN SERPL-MCNC: 4 G/DL (ref 3.5–5)
ALBUMIN/GLOB SERPL: 1.2 RATIO (ref 1.1–2)
ALP SERPL-CCNC: 83 UNIT/L (ref 40–150)
ALT SERPL-CCNC: 34 UNIT/L (ref 0–55)
ANION GAP SERPL CALC-SCNC: 8 MEQ/L
AST SERPL-CCNC: 30 UNIT/L (ref 5–34)
BILIRUB SERPL-MCNC: 0.5 MG/DL
BUN SERPL-MCNC: 10 MG/DL (ref 9.8–20.1)
CALCIUM SERPL-MCNC: 10.4 MG/DL (ref 8.4–10.2)
CHLORIDE SERPL-SCNC: 105 MMOL/L (ref 98–107)
CO2 SERPL-SCNC: 27 MMOL/L (ref 22–29)
CREAT SERPL-MCNC: 1.08 MG/DL (ref 0.55–1.02)
CREAT/UREA NIT SERPL: 9
EST. AVERAGE GLUCOSE BLD GHB EST-MCNC: 188.6 MG/DL
GFR SERPLBLD CREATININE-BSD FMLA CKD-EPI: >60 ML/MIN/1.73/M2
GLOBULIN SER-MCNC: 3.3 GM/DL (ref 2.4–3.5)
GLUCOSE SERPL-MCNC: 106 MG/DL (ref 74–100)
HBA1C MFR BLD: 8.2 %
POTASSIUM SERPL-SCNC: 4.4 MMOL/L (ref 3.5–5.1)
PROT SERPL-MCNC: 7.3 GM/DL (ref 6.4–8.3)
SODIUM SERPL-SCNC: 140 MMOL/L (ref 136–145)

## 2024-05-22 PROCEDURE — 80053 COMPREHEN METABOLIC PANEL: CPT

## 2024-05-22 PROCEDURE — 36415 COLL VENOUS BLD VENIPUNCTURE: CPT

## 2024-05-22 PROCEDURE — 99214 OFFICE O/P EST MOD 30 MIN: CPT | Mod: PBBFAC | Performed by: NURSE PRACTITIONER

## 2024-05-22 PROCEDURE — 83036 HEMOGLOBIN GLYCOSYLATED A1C: CPT

## 2024-05-22 PROCEDURE — 92228 IMG RTA DETC/MNTR DS PHY/QHP: CPT | Mod: TC,PBBFAC

## 2024-05-22 PROCEDURE — 99214 OFFICE O/P EST MOD 30 MIN: CPT | Mod: 25,S$PBB,, | Performed by: NURSE PRACTITIONER

## 2024-05-22 RX ORDER — AMLODIPINE BESYLATE 10 MG/1
10 TABLET ORAL DAILY
Qty: 90 TABLET | Refills: 1 | Status: SHIPPED | OUTPATIENT
Start: 2024-05-22

## 2024-05-22 RX ORDER — FUROSEMIDE 20 MG/1
20 TABLET ORAL DAILY
Qty: 3 TABLET | Refills: 0 | Status: SHIPPED | OUTPATIENT
Start: 2024-05-22

## 2024-05-22 RX ORDER — METFORMIN HYDROCHLORIDE 1000 MG/1
1000 TABLET ORAL 2 TIMES DAILY
Qty: 180 TABLET | Refills: 1 | Status: SHIPPED | OUTPATIENT
Start: 2024-05-22

## 2024-05-22 RX ORDER — PIOGLITAZONEHYDROCHLORIDE 30 MG/1
30 TABLET ORAL DAILY
Qty: 90 TABLET | Refills: 1 | Status: SHIPPED | OUTPATIENT
Start: 2024-05-22

## 2024-05-22 RX ORDER — BENZONATATE 200 MG/1
200 CAPSULE ORAL 3 TIMES DAILY PRN
COMMUNITY

## 2024-05-22 RX ORDER — GLIMEPIRIDE 4 MG/1
6 TABLET ORAL
Qty: 135 TABLET | Refills: 1 | Status: SHIPPED | OUTPATIENT
Start: 2024-05-22

## 2024-05-22 RX ORDER — LISINOPRIL 20 MG/1
20 TABLET ORAL DAILY
Qty: 90 TABLET | Refills: 1 | Status: SHIPPED | OUTPATIENT
Start: 2024-05-22

## 2024-05-22 RX ORDER — ATORVASTATIN CALCIUM 40 MG/1
40 TABLET, FILM COATED ORAL DAILY
Qty: 90 TABLET | Refills: 1 | Status: SHIPPED | OUTPATIENT
Start: 2024-05-22

## 2024-05-22 RX ORDER — LEVOTHYROXINE SODIUM 100 UG/1
100 TABLET ORAL DAILY
Qty: 90 TABLET | Refills: 1 | Status: SHIPPED | OUTPATIENT
Start: 2024-05-22

## 2024-05-22 NOTE — PROGRESS NOTES
Scotty Moctezuma is a 53 y.o. female here for a diabetic eye screening with non-dilated fundus photos per BOOGIE Ivan.    Patient cooperative?: Yes  Small pupils?: No  Last eye exam: tita    For exam results, see Encounter Report.

## 2024-05-22 NOTE — PROGRESS NOTES
Patient ID: [id number]     Chief Complaint: Follow-up and Cough    HPI:     Scotty Moctezuma is a 53 y.o. female with diagnosis of HTN, HLD, DM2, Hypothyroidism, SUNCT Headaches, Obesity. Patient seen today for follow up. Patient last seen in clinic on 2024.  Patient currently prescribed Glimepiride 6 mg Qam, Metformin 1,000 mg bid, Actos 30 mg daily for DM2. Previous A1c 7.9; A1c increased to 8.2. Patient states she checks her CBGs at home but not consistently. Patient states she does have hypoglycemia at times.    Patient currently prescribed Atorvastatin for HLD.   Patient states recently treated for URI, states still has occasional cough. Patient denies SOB, chest pain, fever.   Patient denies any other acute complaints.     Patient followed by GYN clinic. Last appointment on 01/10/2024. Patient has follow up appointment scheduled for 01/15/2025.     Patient followed by Neurology Clinic. Last appointment on 2023. Dx: SUNCT, Stroke-like symptoms, Memory Loss. Plan: continue Lisinopril 20 mg daily, Lamotrigine  mg daily. Consider MagOx and/or Riboflavin at next visit. Patient has follow up appointment scheduled for 2024.     Review of patient's allergies indicates:  No Known Allergies    Breast Cancer Screening: MMG benign on 2024  Cervical Cancer Screenin2021  Colorectal Cancer Screening: Colonoscopy on 2022 with recommended repeat in 10 years  Diabetic Eye Exam: ordered 2024  Diabetic Foot Exam: 2024  Lung Cancer Screening: N/A  Prostate Cancer Screening: N/A  AAA Screening: N/A  Osteoporosis Screening: deferred due to age  Medicare Wellness: N/A  Immunizations:   Immunization History   Administered Date(s) Administered    COVID-19 Vaccine 2022    COVID-19, MRNA, LN-S, PF (Pfizer) (Purple Cap) 2021, 2021    Influenza - Quadrivalent - PF *Preferred* (6 months and older) 2021, 2022, 2022    Tdap 2020    Zoster  2022     Past Surgical History:   Procedure Laterality Date     SECTION      CHOLECYSTECTOMY      TUBAL LIGATION       family history includes Diabetes Mellitus in her father; Heart failure in her mother.    Social History     Socioeconomic History    Marital status:     Number of children: 3   Tobacco Use    Smoking status: Never    Smokeless tobacco: Never   Substance and Sexual Activity    Alcohol use: Not Currently     Comment: 1-2 times a year    Drug use: Never    Sexual activity: Not Currently     Partners: Male   Social History Narrative    ** Merged History Encounter **          Social Determinants of Health     Financial Resource Strain: High Risk (2024)    Overall Financial Resource Strain (CARDIA)     Difficulty of Paying Living Expenses: Hard   Food Insecurity: No Food Insecurity (2024)    Hunger Vital Sign     Worried About Running Out of Food in the Last Year: Never true     Ran Out of Food in the Last Year: Never true   Transportation Needs: Unmet Transportation Needs (2024)    PRAPARE - Transportation     Lack of Transportation (Medical): Yes     Lack of Transportation (Non-Medical): Yes   Physical Activity: Unknown (2024)    Exercise Vital Sign     Days of Exercise per Week: 1 day   Recent Concern: Physical Activity - Insufficiently Active (2024)    Exercise Vital Sign     Days of Exercise per Week: 1 day     Minutes of Exercise per Session: 60 min   Stress: No Stress Concern Present (2024)    [employer] - Occupational Stress Questionnaire     Feeling of Stress : Only a little   Housing Stability: Low Risk  (2024)    Housing Stability Vital Sign     Unable to Pay for Housing in the Last Year: No     Number of Places Lived in the Last Year: 1     Unstable Housing in the Last Year: No     Current Outpatient Medications   Medication Instructions    albuterol (PROVENTIL/VENTOLIN HFA) 90 mcg/actuation inhaler 2 puffs,  "Inhalation, Every 6 hours PRN    amLODIPine (NORVASC) 10 mg, Oral, Daily    amoxicillin (AMOXIL) 500 mg, 2 times daily    atorvastatin (LIPITOR) 40 mg, Oral, Daily    benzonatate (TESSALON) 200 mg, Oral, 3 times daily PRN    cetirizine (ZYRTEC) 10 mg, Oral, Daily    EUTHYROX 100 mcg, Oral, Daily    furosemide (LASIX) 20 mg, Oral, Daily    glimepiride (AMARYL) 6 mg, Oral, With breakfast    lamotrigine XR (LAMICTAL XR) 100 mg, Oral, Daily    liraglutide 0.6 mg/0.1 mL (18 mg/3 mL) subq PNIJ (VICTOZA 2-LIEN) 0.6 mg, Subcutaneous, Daily    lisinopriL (PRINIVIL,ZESTRIL) 20 mg, Oral, Daily    metFORMIN (GLUCOPHAGE) 1,000 mg, Oral, 2 times daily    ondansetron (ZOFRAN-ODT) 4 mg, Oral, Every 8 hours PRN    pioglitazone (ACTOS) 30 mg, Oral, Daily       Subjective:     Review of Systems   Constitutional: Negative.    HENT: Negative.     Eyes: Negative.    Respiratory:  Positive for cough.    Cardiovascular: Negative.    Gastrointestinal: Negative.    Endocrine: Negative.    Genitourinary: Negative.    Musculoskeletal: Negative.    Skin: Negative.    Allergic/Immunologic: Negative.    Neurological: Negative.    Hematological: Negative.    Psychiatric/Behavioral: Negative.         Objective:     Visit Vitals  /67 (BP Location: Left arm, Patient Position: Sitting, BP Method: Large (Automatic))   Pulse 72   Temp 97.8 °F (36.6 °C) (Oral)   Resp 18   Ht 5' 2.01" (1.575 m)   Wt 101.6 kg (224 lb)   LMP 10/30/2020 (Exact Date)   BMI 40.96 kg/m²       Physical Exam  Vitals reviewed.   Constitutional:       Appearance: Normal appearance. She is obese.   HENT:      Head: Normocephalic and atraumatic.      Mouth/Throat:      Mouth: Mucous membranes are moist.      Pharynx: Oropharynx is clear.   Eyes:      Extraocular Movements: Extraocular movements intact.      Conjunctiva/sclera: Conjunctivae normal.      Pupils: Pupils are equal, round, and reactive to light.   Cardiovascular:      Rate and Rhythm: Normal rate and regular rhythm. "      Heart sounds: Murmur heard.      Comments: Trace edema to BLE  Pulmonary:      Effort: Pulmonary effort is normal.      Breath sounds: Normal breath sounds.   Abdominal:      General: Bowel sounds are normal.   Musculoskeletal:         General: Normal range of motion.      Cervical back: Normal range of motion.      Right lower leg: Edema present.      Left lower leg: Edema present.   Skin:     General: Skin is warm and dry.   Neurological:      Mental Status: She is alert and oriented to person, place, and time.   Psychiatric:         Mood and Affect: Mood normal.         Behavior: Behavior normal.     Protective Sensation (w/ 10 gram monofilament):  Right: Intact  Left: Intact    Visual Inspection:  Normal -  Bilateral    Pedal Pulses:   Right: Present  Left: Present    Posterior Tibialis Pulses:   Right:Present  Left: Present      Labs Reviewed:     Hematology:  Lab Results   Component Value Date    WBC 4.36 (L) 04/09/2024    HGB 13.4 04/09/2024    HCT 39.7 04/09/2024     04/09/2024     Chemistry:  Lab Results   Component Value Date     05/22/2024    K 4.4 05/22/2024    CHLORIDE 105 05/22/2024    BUN 10.0 05/22/2024    CREATININE 1.08 (H) 05/22/2024    EGFRNORACEVR >60 05/22/2024    GLUCOSE 106 (H) 05/22/2024    CALCIUM 10.4 (H) 05/22/2024    ALKPHOS 83 05/22/2024    LABPROT 7.3 05/22/2024    ALBUMIN 4.0 05/22/2024    BILIDIR 0.1 12/03/2021    IBILI 0.30 12/03/2021    AST 30 05/22/2024    ALT 34 05/22/2024    MG 2.1 11/04/2019    PHOS 3.7 08/12/2019    CYGSOSUU98VP 47.7 11/11/2022      Lab Results   Component Value Date    HGBA1C 8.2 (H) 05/22/2024      Lipid Panel:  Lab Results   Component Value Date    CHOL 165 02/16/2024    HDL 67 (H) 02/16/2024    LDL 85.00 02/16/2024    TRIG 67 02/16/2024    TOTALCHOLEST 2 02/16/2024      Thyroid:  Lab Results   Component Value Date    TSH 1.782 05/01/2023    T3FREE 1.96 (L) 03/03/2020      Urine:  Lab Results   Component Value Date    COLORUA Light-Yellow  02/16/2024    APPEARANCEUA Clear 02/16/2024    SGUA 1.016 02/16/2024    PHUA 6.0 02/16/2024    PROTEINUA Negative 02/16/2024    GLUCOSEUA Normal 02/16/2024    KETONESUA Negative 02/16/2024    BLOODUA Negative 02/16/2024    NITRITESUA Negative 02/16/2024    LEUKOCYTESUR Negative 02/16/2024    RBCUA 0-5 02/16/2024    WBCUA 0-5 02/16/2024    BACTERIA Trace (A) 02/16/2024    SQEPUA Few (A) 02/16/2024    HYALINECASTS None Seen 02/16/2024    CREATRANDUR 84.2 02/16/2024        Assessment:       ICD-10-CM ICD-9-CM   1. Type 2 diabetes mellitus with hyperglycemia, without long-term current use of insulin  E11.65 250.00     790.29   2. Mixed hyperlipidemia  E78.2 272.2   3. Essential hypertension  I10 401.9   4. Murmur, cardiac  R01.1 785.2   5. Class 3 severe obesity due to excess calories with serious comorbidity and body mass index (BMI) of 40.0 to 44.9 in adult  E66.01 278.01    Z68.41 V85.41   6. Diabetic retinopathy screening  Z13.5 V80.2       Plan:     1. Type 2 diabetes mellitus with hyperglycemia, without long-term current use of insulin  Continue Metformin 1,000 mg bid, Glimepiride 6 mg daily, Actos 30 mg daily  Start Victoza 0.6 mg SQ daily  Encouraged home CBG monitoring.  Hypoglycemic episodes: denies  Body mass index is 40.96 kg/m².  Hemoglobin A1c   Date Value Ref Range Status   05/22/2024 8.2 (H) <=7.0 % Final     Results for orders placed or performed in visit on 02/16/24   Microalbumin/Creatinine Ratio, Urine   Result Value Ref Range    Urine Microalbumin 29.4 <=30.0 ug/ml    Urine Creatinine 84.2 45.0 - 106.0 mg/dL    Microalbumin Creatinine Ratio 34.9 (H) 0.0 - 30.0 mg/gm Cr   On Atorvastatin  On Lisinopril  Weight Loss Encouraged  ADA Diet    2. Mixed hyperlipidemia  Continue Atorvastatin 40 mg daily  Weight loss encouraged  Low fat/high fiber diet  Increase physical activity  Cholesterol Total   Date Value Ref Range Status   02/16/2024 165 <=200 mg/dL Final     HDL Cholesterol   Date Value Ref Range  Status   02/16/2024 67 (H) 35 - 60 mg/dL Final     Triglyceride   Date Value Ref Range Status   02/16/2024 67 37 - 140 mg/dL Final     LDL Cholesterol   Date Value Ref Range Status   02/16/2024 85.00 50.00 - 140.00 mg/dL Final     3. Essential hypertension  Vitals:    05/22/24 1351   BP: 122/67   Pulse: 72   Resp: 18   Temp: 97.8 °F (36.6 °C)      Results for orders placed or performed in visit on 02/16/24   Microalbumin/Creatinine Ratio, Urine   Result Value Ref Range    Urine Microalbumin 29.4 <=30.0 ug/ml    Urine Creatinine 84.2 45.0 - 106.0 mg/dL    Microalbumin Creatinine Ratio 34.9 (H) 0.0 - 30.0 mg/gm Cr     Results for orders placed or performed during the hospital encounter of 04/09/24   EKG 12-lead (Shortness of Breath) Age > 50    Collection Time: 04/09/24  4:49 PM   Result Value Ref Range    QRS Duration 82 ms    [facility]HS QTC Calculation 455 ms    Narrative    Test Reason : R06.02,    Vent. Rate : 094 BPM     Atrial Rate : 094 BPM     P-R Int : 190 ms          QRS Dur : 082 ms      QT Int : 364 ms       P-R-T Axes : 063 036 028 degrees     QTc Int : 455 ms    Normal sinus rhythm  Low voltage QRS  Borderline Abnormal ECG  No previous ECGs available  Confirmed by Andres Yoon MD ([id number]) on 4/10/2024 5:43:45 PM    Referred By:             Confirmed By:Andres Yoon MD   Continue Amlodipine 10 mg daily, Lisinopril 10 mg daily  DASH diet  Encouraged home blood pressure monitoring    4. Murmur, cardiac  BLE edema noted  Echo ordered  Lasix 20 mg daily x3 days  - Echo; Future    5. Class 3 severe obesity due to excess calories with serious comorbidity and body mass index (BMI) of 40.0 to 44.9 in adult  Body mass index is 40.96 kg/m².  TSH   Date Value Ref Range Status   05/01/2023 1.782 0.350 - 4.940 uIU/mL Final     Vitamin D   Date Value Ref Range Status   11/11/2022 47.7 30.0 - 80.0 ng/mL Final     Hemoglobin A1c   Date Value Ref Range Status   05/22/2024 8.2 (H) <=7.0 % Final   Sleep Study: none  noted  Weight Loss Encouraged  Increase Physical Activity    6. Diabetic retinopathy screening  - Diabetic Eye Screening Photo      Follow up in about 3 months (around 8/22/2024) for Labs. In addition to their scheduled follow up, the patient has also been instructed to follow up on as needed basis.     Catrina Quiroz, LOGANP

## 2024-05-23 DIAGNOSIS — E11.65 TYPE 2 DIABETES MELLITUS WITH HYPERGLYCEMIA, WITHOUT LONG-TERM CURRENT USE OF INSULIN: Primary | Chronic | ICD-10-CM

## 2024-05-23 NOTE — PROGRESS NOTES
Please notify patient that moderate diabetic retinopathy noted on eye exam. Referral to Ophthalmology clinic ordered.

## 2024-05-26 ENCOUNTER — HOSPITAL ENCOUNTER (EMERGENCY)
Facility: HOSPITAL | Age: 54
Discharge: HOME | End: 2024-05-26
Attending: EMERGENCY MEDICINE

## 2024-05-26 VITALS
HEART RATE: 68 BPM | OXYGEN SATURATION: 99 % | BODY MASS INDEX: 38.87 KG/M2 | SYSTOLIC BLOOD PRESSURE: 137 MMHG | RESPIRATION RATE: 19 BRPM | TEMPERATURE: 98 F | HEIGHT: 63 IN | DIASTOLIC BLOOD PRESSURE: 82 MMHG | WEIGHT: 219.38 LBS

## 2024-05-26 DIAGNOSIS — J45.21 MILD INTERMITTENT ASTHMA WITH EXACERBATION: Primary | ICD-10-CM

## 2024-05-26 DIAGNOSIS — R06.02 SOB (SHORTNESS OF BREATH): ICD-10-CM

## 2024-05-26 LAB
FLUAV AG UPPER RESP QL IA.RAPID: NOT DETECTED
FLUBV AG UPPER RESP QL IA.RAPID: NOT DETECTED
RSV A 5' UTR RNA NPH QL NAA+PROBE: NOT DETECTED
SARS-COV-2 RNA RESP QL NAA+PROBE: NOT DETECTED

## 2024-05-26 PROCEDURE — 93005 ELECTROCARDIOGRAM TRACING: CPT

## 2024-05-26 PROCEDURE — 99284 EMERGENCY DEPT VISIT MOD MDM: CPT | Mod: 25

## 2024-05-26 PROCEDURE — 96372 THER/PROPH/DIAG INJ SC/IM: CPT | Performed by: EMERGENCY MEDICINE

## 2024-05-26 PROCEDURE — 25000003 PHARM REV CODE 250: Performed by: EMERGENCY MEDICINE

## 2024-05-26 PROCEDURE — 63600175 PHARM REV CODE 636 W HCPCS: Performed by: EMERGENCY MEDICINE

## 2024-05-26 PROCEDURE — 0241U COVID/RSV/FLU A&B PCR: CPT | Performed by: EMERGENCY MEDICINE

## 2024-05-26 PROCEDURE — 94640 AIRWAY INHALATION TREATMENT: CPT

## 2024-05-26 PROCEDURE — 25000242 PHARM REV CODE 250 ALT 637 W/ HCPCS: Performed by: EMERGENCY MEDICINE

## 2024-05-26 RX ORDER — ACETAMINOPHEN 500 MG
1000 TABLET ORAL
Status: COMPLETED | OUTPATIENT
Start: 2024-05-26 | End: 2024-05-26

## 2024-05-26 RX ORDER — NAPROXEN 250 MG/1
500 TABLET ORAL
Status: DISCONTINUED | OUTPATIENT
Start: 2024-05-26 | End: 2024-05-26 | Stop reason: HOSPADM

## 2024-05-26 RX ORDER — ALBUTEROL SULFATE 0.83 MG/ML
2.5 SOLUTION RESPIRATORY (INHALATION)
Status: COMPLETED | OUTPATIENT
Start: 2024-05-26 | End: 2024-05-26

## 2024-05-26 RX ORDER — ONDANSETRON 4 MG/1
4 TABLET, ORALLY DISINTEGRATING ORAL
Status: COMPLETED | OUTPATIENT
Start: 2024-05-26 | End: 2024-05-26

## 2024-05-26 RX ORDER — PROMETHAZINE HYDROCHLORIDE 25 MG/ML
12.5 INJECTION, SOLUTION INTRAMUSCULAR; INTRAVENOUS
Status: COMPLETED | OUTPATIENT
Start: 2024-05-26 | End: 2024-05-26

## 2024-05-26 RX ADMIN — ALBUTEROL SULFATE 2.5 MG: 2.5 SOLUTION RESPIRATORY (INHALATION) at 11:05

## 2024-05-26 RX ADMIN — PROMETHAZINE HYDROCHLORIDE 12.5 MG: 25 INJECTION INTRAMUSCULAR; INTRAVENOUS at 01:05

## 2024-05-26 RX ADMIN — ACETAMINOPHEN 1000 MG: 500 TABLET ORAL at 11:05

## 2024-05-26 RX ADMIN — ONDANSETRON 4 MG: 4 TABLET, ORALLY DISINTEGRATING ORAL at 11:05

## 2024-05-26 NOTE — ED PROVIDER NOTES
Encounter Date: 2024       History     Chief Complaint   Patient presents with    Shortness of Breath     SOB since yesterday unrelieved by home breathing txs and inhaler; hx of asthma. Also c/o headache and nausea.      She has had asthma since about age 11, no recent steroid use, symptoms now since yesterday mild dyspnea, mild cough, some sputum production, headache, mild wheezing.  She did a nebulizer at home yesterday and again today, on both occasions she had nausea and vomiting afterward which is not typical for her.  She has Zofran at home but had not thought to take any.  No fever, chills, sweats, urinary complaints, chest pain, or abdominal pain.  She is wondering if she might have a black mold infestation at her home.  Some headache and some persistent nausea.  No diarrhea or other specific complaints.  Feels better now at rest.    The history is provided by the patient. No  was used.     Review of patient's allergies indicates:  No Known Allergies  Past Medical History:   Diagnosis Date    Diabetes mellitus, type 2     Essential hypertension 2022    Headache     High cholesterol     Thyroid disease      Past Surgical History:   Procedure Laterality Date     SECTION      CHOLECYSTECTOMY      TUBAL LIGATION       Family History   Problem Relation Name Age of Onset    Heart failure Mother      Diabetes Mellitus Father       Social History     Tobacco Use    Smoking status: Never    Smokeless tobacco: Never   Substance Use Topics    Alcohol use: Not Currently     Comment: 1-2 times a year    Drug use: Never     Review of Systems   Constitutional:  Negative for activity change, fatigue and fever.   HENT:  Negative for congestion, ear pain, facial swelling, nosebleeds, sinus pressure and sore throat.    Eyes:  Negative for pain, discharge, redness and visual disturbance.   Respiratory:  Positive for cough, shortness of breath and wheezing. Negative for choking and chest  tightness.    Cardiovascular:  Negative for chest pain, palpitations and leg swelling.   Gastrointestinal:  Positive for nausea and vomiting. Negative for abdominal distention and abdominal pain.   Endocrine: Negative for heat intolerance, polydipsia and polyuria.   Genitourinary:  Negative for difficulty urinating, dysuria, flank pain, hematuria and urgency.   Musculoskeletal:  Negative for back pain, gait problem, joint swelling and myalgias.   Skin:  Negative for color change and rash.   Allergic/Immunologic: Negative for environmental allergies and food allergies.   Neurological:  Positive for headaches. Negative for dizziness, weakness and numbness.   Hematological:  Negative for adenopathy. Does not bruise/bleed easily.   Psychiatric/Behavioral:  Negative for agitation and behavioral problems. The patient is not nervous/anxious.    All other systems reviewed and are negative.      Physical Exam     Initial Vitals [05/26/24 0930]   BP Pulse Resp Temp SpO2   (!) 151/79 61 18 97.7 °F (36.5 °C) 99 %      MAP       --         Physical Exam    Nursing note and vitals reviewed.  Constitutional: She appears well-developed and well-nourished. She is not diaphoretic. She appears distressed.   Mild discomfort; mild to moderately obese   HENT:   Head: Normocephalic and atraumatic.   Mouth/Throat: No oropharyngeal exudate.   Eyes: Conjunctivae and EOM are normal. Pupils are equal, round, and reactive to light. Right eye exhibits no discharge. Left eye exhibits no discharge. No scleral icterus.   Neck: Neck supple. No thyromegaly present. No tracheal deviation present. No JVD present.   Normal range of motion.  Cardiovascular:  Normal rate, regular rhythm, normal heart sounds and intact distal pulses.     Exam reveals no gallop and no friction rub.       No murmur heard.  Pulmonary/Chest: Breath sounds normal. No stridor. No respiratory distress. She has no wheezes. She has no rhonchi. She has no rales. She exhibits no  tenderness.   Mildly diminished air entry; clear lungs   Abdominal: Abdomen is soft. Bowel sounds are normal. She exhibits no distension and no mass. There is no abdominal tenderness. There is no rebound and no guarding.   Musculoskeletal:         General: No tenderness or edema. Normal range of motion.      Cervical back: Normal range of motion and neck supple.     Neurological: She is alert and oriented to person, place, and time. She has normal strength.   Skin: Skin is warm and dry. No rash and no abscess noted. No erythema.   Psychiatric: She has a normal mood and affect. Her behavior is normal. Judgment and thought content normal.         ED Course   Procedures  Labs Reviewed   COVID/RSV/FLU A&B PCR - Normal    Narrative:     The Xpert Xpress SARS-CoV-2/FLU/RSV plus is a rapid, multiplexed real-time PCR test intended for the simultaneous qualitative detection and differentiation of SARS-CoV-2, Influenza A, Influenza B, and respiratory syncytial virus (RSV) viral RNA in either nasopharyngeal swab or nasal swab specimens.           EKG Readings: (Independently Interpreted)   Initial Reading: No STEMI. Rhythm: Normal Sinus Rhythm. Ectopy: No Ectopy.   Normal sinus rhythm at 72 beats per minute, low-voltage QRS, slightly noisy baseline, no significant change from previous.     ECG Results              EKG 12-lead (Shortness of Breath) Age > 50 (In process)        Collection Time Result Time QRS Duration [facility] QTC Calculation    05/26/24 09:27:44 05/26/24 09:30:38 82 413                     In process by Interface, [facility] (05/26/24 09:30:45)                   Narrative:    Test Reason : R06.02,    Vent. Rate : 072 BPM     Atrial Rate : 072 BPM     P-R Int : 184 ms          QRS Dur : 082 ms      QT Int : 378 ms       P-R-T Axes : 064 042 040 degrees     QTc Int : 413 ms    Normal sinus rhythm  Low voltage QRS  Borderline Abnormal ECG  When compared with ECG of 09-APR-2024 16:49,  No significant change was  found    Referred By:             Confirmed By:                                   Imaging Results              X-Ray Chest PA And Lateral (Final result)  Result time 05/26/24 11:53:08      Final result by Harley Davila MD (05/26/24 11:53:08)                   Impression:      No acute pulmonary process appreciated.      Electronically signed by: Harley Davila  Date:    05/26/2024  Time:    11:53               Narrative:    EXAMINATION:  XR CHEST PA AND LATERAL    CLINICAL HISTORY:  SOB;    TECHNIQUE:  PA and lateral radiographs of the chest    COMPARISON:  Radiography 04/09/2024    FINDINGS:  Normal cardiac silhouette. The lungs are well-inflated. No consolidation identified. No pleural effusion or discernible pneumothorax.                                       Medications   naproxen tablet 500 mg (500 mg Oral Not Given 5/26/24 1113)   ondansetron disintegrating tablet 4 mg (4 mg Oral Given 5/26/24 1110)   acetaminophen tablet 1,000 mg (1,000 mg Oral Given 5/26/24 1110)   albuterol nebulizer solution 2.5 mg (2.5 mg Nebulization Given 5/26/24 1114)   promethazine injection 12.5 mg (12.5 mg Intramuscular Given 5/26/24 1320)       12:42 PM Episode of n/v x 1.    2:09 PM All sx resolved - resting comfortably - VSS/ ready to go home. D/C instructions:        Testing here is fine.      Use your inhaler and Zofran as labeled as needed at home.    See your doctor for recheck in 2 or 3 days.    Return here if worse.        Medical Decision Making  Problems Addressed:  Mild intermittent asthma with exacerbation: chronic illness or injury with exacerbation, progression, or side effects of treatment    Amount and/or Complexity of Data Reviewed  Labs: ordered. Decision-making details documented in ED Course.  Radiology: ordered. Decision-making details documented in ED Course.    Risk  OTC drugs.  Prescription drug management.  Decision regarding hospitalization.      Additional MDM:   Differential Diagnosis:   Asthma  exacerbation/ COVID/ flu/ pneumonia among many others                                    Clinical Impression:  Final diagnoses:  [R06.02] SOB (shortness of breath)  [J45.21] Mild intermittent asthma with exacerbation (Primary)          ED Disposition Condition    Discharge Stable          ED Prescriptions    None       Follow-up Information       Follow up With Specialties Details Why Contact Info    [facility] Emergency Medicine  As needed [address] [facility] [address]  [phone]    Catrina Quiroz, P Family Medicine Schedule an appointment as soon as possible for a visit   [address] [facility] [address]  [phone]               Jason De La Rosa MD  05/26/24 1406

## 2024-05-26 NOTE — DISCHARGE INSTRUCTIONS
Testing here is fine.      Use your inhaler and Zofran as labeled as needed at home.    See your doctor for recheck in 2 or 3 days.    Return here if worse.

## 2024-05-27 LAB
OHS QRS DURATION: 82 MS
OHS QTC CALCULATION: 413 MS

## 2024-05-27 NOTE — PROGRESS NOTES
Informed of moderate diabetic retinopathy noted on eye exam.Ophthalmology referral made. Will be contacted once appointment made by schedulers.She voiced understanding.

## 2024-05-28 ENCOUNTER — E-VISIT (OUTPATIENT)
Dept: FAMILY MEDICINE | Facility: CLINIC | Age: 54
End: 2024-05-28

## 2024-05-28 DIAGNOSIS — R11.0 NAUSEA: Primary | ICD-10-CM

## 2024-05-28 PROCEDURE — 99422 OL DIG E/M SVC 11-20 MIN: CPT | Mod: ,,, | Performed by: FAMILY MEDICINE

## 2024-05-28 RX ORDER — ONDANSETRON 4 MG/1
8 TABLET, ORALLY DISINTEGRATING ORAL EVERY 8 HOURS PRN
Qty: 30 TABLET | Refills: 0 | Status: SHIPPED | OUTPATIENT
Start: 2024-05-28

## 2024-05-28 NOTE — PROGRESS NOTES
Patient ID: Scotty Moctezuma is a 53 y.o. female.    Chief Complaint: GI Problem (Entered automatically based on patient selection in Patient Portal.)    The patient initiated a request through [employer] on 5/28/2024 for evaluation and management with a chief complaint of GI Problem (Entered automatically based on patient selection in Patient Portal.)     I evaluated the questionnaire submission on 5/28/24.    Ohs Peq Evisit Diarrhea    5/28/2024 12:51 PM CDT - Filed by Patient   Do you agree to participate in an E-Visit? Yes   If you have any of the following symptoms, please present to your local emergency room or call 911:  I acknowledge   Are you pregnant, could you be pregnant, or are you breast feeding? None of the above   What is the main issue you would like addressed today? Vomiting for three days once or twice daily.  Weakness and weight loss. No appetite.   Do you have diarrhea? No   Are you vomiting? Yes, I am vomiting occasionally   Are you able to keep down fluids? Yes, I can keep down some fluids.   Do you have belly pain? I have a little pain or no pain   Are you feeling dizzy or like you might pass out? Yes   When did your symptoms begin? 5/25/2024   Do you have a fever? No, I do not have a fever   Are you having trouble walking or lifting yourself due to weakness from this illness?  No   Do any of the following apply to you? My urine is normal   Did your condition begin after a specific meal that may have caused the illness? Yes, after seven to twelve hours   Please enter some information about your meal, including what you ate that may have caused your illness. My meal was green beans a slice of watermelon and half of a steak last night. This morning I could  only eat fruit cocktail which has since been  thron back up. Can only eat clear liquids.    Have you taken antibiotics recently? I have not been on any antibiotics   Have you been hospitalized in the past 2 months? No, I have not been  hospitalized recently.   Do you work in a  center or healthcare environment? No   Does anyone you know have similar symptoms? No   Have you had a meal consisting of raw meat or fish in the week prior to your illness? No   Have you recently travelled to a place where you may have caught an illness? No   Have you tried any medication or other treatment for your symptoms? Yes   Please list the treatments you tried, and the result of those treatments. Zofran for nausea   Provide any additional information you feel is important.    Please attach any relevant images or files    Are you able to take your vital signs? Yes   Systolic Blood Pressure: 100   Diastolic Blood Pressure: 60   Weight: 217   Height: 63   Pulse:    Temperature:    Respiration rate:    Pulse Oxygen:          Encounter Diagnosis   Name Primary?    Nausea Yes        No orders of the defined types were placed in this encounter.     Medications Ordered This Encounter   Medications    ondansetron (ZOFRAN-ODT) 4 MG TbDL     Sig: Take 2 tablets (8 mg total) by mouth every 8 (eight) hours as needed.     Dispense:  30 tablet     Refill:  0     Please notify patient when ready to be picked up.        No follow-ups on file.      E-Visit Time Trackin mins

## 2024-05-29 ENCOUNTER — TELEPHONE (OUTPATIENT)
Dept: INTERNAL MEDICINE | Facility: CLINIC | Age: 54
End: 2024-05-29

## 2024-05-29 NOTE — TELEPHONE ENCOUNTER
----- Message from Margarita Meadows sent at 5/29/2024  7:51 AM CDT -----  Regarding: advice  Type:  Needs Medical Advice    Who Called: pt  Symptoms (please be specific): vomiting, diarrhea, nauseated all day   How long has patient had these symptoms:  sat 5/25  Pharmacy name and phone #:  [facility]  Would the patient rather a call back or a response via [facility]? C/b  Best Call Back Number: [phone]      Additional Information: pt went to ER 5/26, pt was given med for nausea  Pt req appt but none avail for 2 weeks    Please contact pt to discuss

## 2024-05-29 NOTE — TELEPHONE ENCOUNTER
Contacted informed that Ms. Quiroz NP is out of the office until Tuesday. Instructed to go back to the ED for further evaluation . Being a diabetic and with her symptoms it's easy to become dehydrated , may need fluids and labs.  She voiced understanding.

## 2024-06-15 ENCOUNTER — E-VISIT (OUTPATIENT)
Dept: INTERNAL MEDICINE | Facility: CLINIC | Age: 54
End: 2024-06-15

## 2024-06-15 DIAGNOSIS — R11.0 NAUSEA: Primary | ICD-10-CM

## 2024-06-17 NOTE — PROGRESS NOTES
Patient ID: Scotty Moctezuma is a 53 y.o. female.    Chief Complaint: GI Problem (Entered automatically based on patient selection in Patient Portal.)    The patient initiated a request through [employer] on 6/15/2024 for evaluation and management with a chief complaint of GI Problem (Entered automatically based on patient selection in Patient Portal.)     I evaluated the questionnaire submission on 06/17/2024.    Ohs Peq Evisit Diarrhea    6/15/2024  4:52 PM CDT - Filed by Patient   Do you agree to participate in an E-Visit? Yes   If you have any of the following symptoms, please present to your local emergency room or call 911:  I acknowledge   Are you pregnant, could you be pregnant, or are you breast feeding? None of the above   What is the main issue you would like addressed today? Nausea for four weeks. Unable to eat meat or anything that has a strong smell. Constant low glucose from inability to eat. Weakness and headaches.   Do you have diarrhea? No   Are you vomiting? Yes, I am vomiting occasionally   Are you able to keep down fluids? Yes, I can keep down some fluids.   Do you have belly pain? I have pain in the upper part of my belly   Are you feeling dizzy or like you might pass out? No   When did your symptoms begin? 5/23/2024   Do you have a fever? No, I do not have a fever   Are you having trouble walking or lifting yourself due to weakness from this illness?  No   Do any of the following apply to you? My urine is normal   Did your condition begin after a specific meal that may have caused the illness? Not clearly related to a meal.    Have you taken antibiotics recently? I have not been on any antibiotics   Have you been hospitalized in the past 2 months? No, I have not been hospitalized recently.   Do you work in a  center or healthcare environment? No   Does anyone you know have similar symptoms? No   Have you had a meal consisting of raw meat or fish in the week prior to your illness? No    Have you recently travelled to a place where you may have caught an illness? No   Have you tried any medication or other treatment for your symptoms? Yes   Please list the treatments you tried, and the result of those treatments. Zofran   Provide any additional information you feel is important.    Please attach any relevant images or files    Are you able to take your vital signs? Yes   Systolic Blood Pressure: 115   Diastolic Blood Pressure: 70   Weight: 214   Height: 63   Pulse: 77   Temperature:    Respiration rate:    Pulse Oxygen: 99       Encounter Diagnosis   Name Primary?    Nausea Yes     Orders Placed This Encounter   Procedures    X-Ray Abdomen Flat And Erect     Standing Status:   Future     Standing Expiration Date:   12/17/2024     Order Specific Question:   Reason for Exam:     Answer:   nausea     Order Specific Question:   May the Radiologist modify the order per protocol to meet the clinical needs of the patient?     Answer:   Yes     Order Specific Question:   Release to patient     Answer:   Immediate            Follow up if symptoms worsen or fail to improve.      E-Visit Time Tracking:    Day 1 Time (in minutes): 5    Total Time (in minutes): 5

## 2024-07-07 PROCEDURE — 99284 EMERGENCY DEPT VISIT MOD MDM: CPT

## 2024-07-08 ENCOUNTER — HOSPITAL ENCOUNTER (EMERGENCY)
Facility: HOSPITAL | Age: 54
Discharge: HOME | End: 2024-07-08
Attending: FAMILY MEDICINE
Payer: OTHER GOVERNMENT

## 2024-07-08 VITALS
HEIGHT: 63 IN | DIASTOLIC BLOOD PRESSURE: 75 MMHG | BODY MASS INDEX: 37.92 KG/M2 | WEIGHT: 214 LBS | SYSTOLIC BLOOD PRESSURE: 118 MMHG | TEMPERATURE: 98 F | OXYGEN SATURATION: 99 % | RESPIRATION RATE: 18 BRPM | HEART RATE: 62 BPM

## 2024-07-08 DIAGNOSIS — S39.012A STRAIN OF LUMBAR REGION, INITIAL ENCOUNTER: Primary | ICD-10-CM

## 2024-07-08 PROCEDURE — 96372 THER/PROPH/DIAG INJ SC/IM: CPT | Performed by: NURSE PRACTITIONER

## 2024-07-08 PROCEDURE — 63600175 PHARM REV CODE 636 W HCPCS: Performed by: NURSE PRACTITIONER

## 2024-07-08 RX ORDER — BACLOFEN 10 MG/1
10 TABLET ORAL 2 TIMES DAILY PRN
Qty: 20 TABLET | Refills: 0 | Status: SHIPPED | OUTPATIENT
Start: 2024-07-08 | End: 2024-07-08

## 2024-07-08 RX ORDER — KETOROLAC TROMETHAMINE 30 MG/ML
30 INJECTION, SOLUTION INTRAMUSCULAR; INTRAVENOUS
Status: COMPLETED | OUTPATIENT
Start: 2024-07-08 | End: 2024-07-08

## 2024-07-08 RX ORDER — INDOMETHACIN 25 MG/1
25 CAPSULE ORAL 2 TIMES DAILY PRN
Qty: 14 CAPSULE | Refills: 0 | Status: SHIPPED | OUTPATIENT
Start: 2024-07-08

## 2024-07-08 RX ORDER — GABAPENTIN 300 MG/1
300 CAPSULE ORAL DAILY
Qty: 14 CAPSULE | Refills: 0 | Status: SHIPPED | OUTPATIENT
Start: 2024-07-08 | End: 2024-07-22

## 2024-07-08 RX ORDER — GABAPENTIN 300 MG/1
300 CAPSULE ORAL DAILY
Qty: 14 CAPSULE | Refills: 0 | Status: SHIPPED | OUTPATIENT
Start: 2024-07-08 | End: 2024-07-08

## 2024-07-08 RX ORDER — BACLOFEN 10 MG/1
10 TABLET ORAL 2 TIMES DAILY PRN
Qty: 20 TABLET | Refills: 0 | Status: SHIPPED | OUTPATIENT
Start: 2024-07-08

## 2024-07-08 RX ORDER — INDOMETHACIN 25 MG/1
25 CAPSULE ORAL 2 TIMES DAILY PRN
Qty: 14 CAPSULE | Refills: 0 | Status: SHIPPED | OUTPATIENT
Start: 2024-07-08 | End: 2024-07-08

## 2024-07-08 RX ADMIN — KETOROLAC TROMETHAMINE 30 MG: 30 INJECTION, SOLUTION INTRAMUSCULAR; INTRAVENOUS at 01:07

## 2024-07-08 NOTE — Clinical Note
"Scotty Mark (JoAnn)as was seen and treated in our emergency department on 7/7/2024.  She may return to work on 07/09/2024.       If you have any questions or concerns, please don't hesitate to call.      Lucero TOMAS    "

## 2024-07-08 NOTE — ED PROVIDER NOTES
"Encounter Date: 2024       History     Chief Complaint   Patient presents with    Back Pain     Pt c/o right sided lower back pain for 3 weeks, tearing sensation. Vss. Has taken tylenol for a dental abscess but reports that this hasn't helped her back pain. Vss.     Pt is a 53 y.o. female who presents to the [facility] ED complaining of Rt lower back pain that worsens with movement and "twisting." Symptoms x 3 weeks. Denies chest pain, SOB, weakness, dizziness, fever, pain with urination, or urinary frequency. Hx of DM, HTN, thyroid disease.       Review of patient's allergies indicates:  No Known Allergies  Past Medical History:   Diagnosis Date    Diabetes mellitus, type 2     Essential hypertension 2022    Headache     High cholesterol     Thyroid disease      Past Surgical History:   Procedure Laterality Date     SECTION      CHOLECYSTECTOMY      TUBAL LIGATION       Family History   Problem Relation Name Age of Onset    Heart failure Mother      Diabetes Mellitus Father       Social History     Tobacco Use    Smoking status: Never    Smokeless tobacco: Never   Substance Use Topics    Alcohol use: Not Currently     Comment: 1-2 times a year    Drug use: Never     Review of Systems   Constitutional:  Negative for chills, diaphoresis, fatigue and fever.   HENT:  Negative for facial swelling, rhinorrhea, sinus pressure, sinus pain, sore throat and trouble swallowing.    Respiratory:  Negative for cough, chest tightness, shortness of breath and wheezing.    Cardiovascular:  Negative for chest pain, palpitations and leg swelling.   Gastrointestinal:  Negative for abdominal pain, diarrhea, nausea and vomiting.   Genitourinary:  Negative for dysuria, flank pain, frequency, hematuria and urgency.   Musculoskeletal:  Positive for back pain. Negative for arthralgias, joint swelling and myalgias.   Skin:  Negative for color change and rash.   Neurological:  Negative for dizziness, syncope, weakness and " light-headedness.   Hematological:  Does not bruise/bleed easily.   All other systems reviewed and are negative.      Physical Exam     Initial Vitals [07/08/24 0029]   BP Pulse Resp Temp SpO2   110/73 68 20 97.8 °F (36.6 °C) 100 %      MAP       --         Physical Exam    Nursing note and vitals reviewed.  Constitutional: She appears well-developed and well-nourished.   HENT:   Head: Normocephalic and atraumatic.   Nose: Nose normal.   Mouth/Throat: Oropharynx is clear and moist.   Eyes: Conjunctivae and EOM are normal. Pupils are equal, round, and reactive to light.   Neck: Neck supple.   Normal range of motion.  Cardiovascular:  Normal rate, regular rhythm, normal heart sounds and intact distal pulses.           Pulmonary/Chest: Effort normal and breath sounds normal. No respiratory distress. She has no wheezes. She has no rhonchi. She has no rales. She exhibits no tenderness.   Abdominal: Abdomen is soft and flat. Bowel sounds are normal. She exhibits no distension. There is no abdominal tenderness. There is no rebound, no guarding, no tenderness at McBurney's point and negative Barboas's sign. negative psoas sign  Musculoskeletal:         General: Normal range of motion.      Cervical back: Normal range of motion and neck supple.      Lumbar back: Spasms and tenderness present. No swelling, edema, deformity or signs of trauma.        Back:      Neurological: She is alert and oriented to person, place, and time. She has normal strength and normal reflexes.   Skin: Skin is warm and dry. Capillary refill takes less than 2 seconds.   Psychiatric: She has a normal mood and affect. Her speech is normal and behavior is normal. Judgment and thought content normal.         ED Course   Procedures  Labs Reviewed - No data to display       Imaging Results              X-Ray Lumbar Spine Ap And Lateral (Preliminary result)  Result time 07/08/24 01:15:03      Wet Read by Jason Salomon Jr., FNP (07/08/24 01:15:03, [facility], Emergency Medicine)    No acute fracture noted.                                     Medications   ketorolac injection 30 mg (30 mg Intramuscular Given 7/8/24 0117)     Medical Decision Making  Differential:  Muscle strain  DDD  Fracture      Amount and/or Complexity of Data Reviewed  Radiology: ordered and independent interpretation performed.    Risk  Prescription drug management.               ED Course as of 07/08/24 0125   Mon Jul 08, 2024   0121 Given strict ED return precautions. I have spoken with the patient and/or caregivers. I have explained the patient's condition, diagnoses and treatment plan based on the information available to me at this time. I have answered the patient's and/or caregiver's questions and addressed any concerns. The patient and/or caregivers have as good an understanding of the patient's diagnosis, condition and treatment plan as can be expected at this point. The vital signs have been stable. The patient's condition is stable and appropriate for discharge from the emergency department.      The patient will pursue further outpatient evaluation with the primary care physician or other designated or consulting physician as outlined in the discharge instructions. The patient and/or caregivers are agreeable to this plan of care and follow-up instructions have been explained in detail. The patient and/or caregivers have received these instructions in written format and have expressed an understanding of the discharge instructions. The patient and/or caregivers are aware that any significant change in condition or worsening of symptoms should prompt an immediate return to this or the closest emergency department or a call to 911.   [JA]      ED Course User Index  [JA] Jason Salomon Jr., P                           Clinical Impression:  Final diagnoses:  [S39.012A] Strain of lumbar region, initial encounter (Primary)          ED Disposition Condition     Discharge Stable          ED Prescriptions       Medication Sig Dispense Start Date End Date Auth. Provider    gabapentin (NEURONTIN) 300 MG capsule Take 1 capsule (300 mg total) by mouth once daily. for 14 days 14 capsule 7/8/2024 7/22/2024 Jason Salomon Jr., BOOGIE    baclofen (LIORESAL) 10 MG tablet Take 1 tablet (10 mg total) by mouth 2 (two) times daily as needed (muscle spasms). 20 tablet 7/8/2024 -- Jason Salomon Jr., FNP    indomethacin (INDOCIN) 25 MG capsule Take 1 capsule (25 mg total) by mouth 2 (two) times daily as needed (pain). 14 capsule 7/8/2024 -- Jason Salomon Jr., FNP          Follow-up Information       Follow up With Specialties Details Why Contact Info    Catrina Quiroz, BOO[facility] Family Medicine In 3 days  [address] [facility] [address]  [phone]               Jason Salomon Jr., FNP  07/08/24 0498

## 2024-08-10 ENCOUNTER — HOSPITAL ENCOUNTER (EMERGENCY)
Facility: HOSPITAL | Age: 54
Discharge: HOME | End: 2024-08-11
Attending: INTERNAL MEDICINE
Payer: OTHER GOVERNMENT

## 2024-08-10 DIAGNOSIS — M79.18 MUSCULOSKELETAL PAIN: Primary | ICD-10-CM

## 2024-08-10 DIAGNOSIS — R10.9 ABDOMINAL PAIN: ICD-10-CM

## 2024-08-10 LAB
ALBUMIN SERPL-MCNC: 3.9 G/DL (ref 3.5–5)
ALBUMIN/GLOB SERPL: 1.2 RATIO (ref 1.1–2)
ALP SERPL-CCNC: 78 UNIT/L (ref 40–150)
ALT SERPL-CCNC: 28 UNIT/L (ref 0–55)
ANION GAP SERPL CALC-SCNC: 10 MEQ/L
AST SERPL-CCNC: 26 UNIT/L (ref 5–34)
BASOPHILS # BLD AUTO: 0.03 X10(3)/MCL
BASOPHILS NFR BLD AUTO: 0.7 %
BILIRUB SERPL-MCNC: 0.4 MG/DL
BUN SERPL-MCNC: 16.5 MG/DL (ref 9.8–20.1)
CALCIUM SERPL-MCNC: 10.5 MG/DL (ref 8.4–10.2)
CHLORIDE SERPL-SCNC: 106 MMOL/L (ref 98–107)
CO2 SERPL-SCNC: 24 MMOL/L (ref 22–29)
CREAT SERPL-MCNC: 1.21 MG/DL (ref 0.55–1.02)
CREAT/UREA NIT SERPL: 14
EOSINOPHIL # BLD AUTO: 0.24 X10(3)/MCL (ref 0–0.9)
EOSINOPHIL NFR BLD AUTO: 5.6 %
ERYTHROCYTE [DISTWIDTH] IN BLOOD BY AUTOMATED COUNT: 13.5 % (ref 11.5–17)
GFR SERPLBLD CREATININE-BSD FMLA CKD-EPI: 53 ML/MIN/1.73/M2
GLOBULIN SER-MCNC: 3.2 GM/DL (ref 2.4–3.5)
GLUCOSE SERPL-MCNC: 105 MG/DL (ref 74–100)
HCT VFR BLD AUTO: 38.1 % (ref 37–47)
HGB BLD-MCNC: 12.9 G/DL (ref 12–16)
IMM GRANULOCYTES # BLD AUTO: 0.01 X10(3)/MCL (ref 0–0.04)
IMM GRANULOCYTES NFR BLD AUTO: 0.2 %
LIPASE SERPL-CCNC: 42 U/L
LYMPHOCYTES # BLD AUTO: 1.59 X10(3)/MCL (ref 0.6–4.6)
LYMPHOCYTES NFR BLD AUTO: 37.3 %
MCH RBC QN AUTO: 30.8 PG (ref 27–31)
MCHC RBC AUTO-ENTMCNC: 33.9 G/DL (ref 33–36)
MCV RBC AUTO: 90.9 FL (ref 80–94)
MONOCYTES # BLD AUTO: 0.3 X10(3)/MCL (ref 0.1–1.3)
MONOCYTES NFR BLD AUTO: 7 %
NEUTROPHILS # BLD AUTO: 2.09 X10(3)/MCL (ref 2.1–9.2)
NEUTROPHILS NFR BLD AUTO: 49.2 %
NRBC BLD AUTO-RTO: 0 %
PLATELET # BLD AUTO: 206 X10(3)/MCL (ref 130–400)
PMV BLD AUTO: 9.8 FL (ref 7.4–10.4)
POTASSIUM SERPL-SCNC: 4.2 MMOL/L (ref 3.5–5.1)
PROT SERPL-MCNC: 7.1 GM/DL (ref 6.4–8.3)
RBC # BLD AUTO: 4.19 X10(6)/MCL (ref 4.2–5.4)
SODIUM SERPL-SCNC: 140 MMOL/L (ref 136–145)
WBC # BLD AUTO: 4.26 X10(3)/MCL (ref 4.5–11.5)

## 2024-08-10 PROCEDURE — 96361 HYDRATE IV INFUSION ADD-ON: CPT

## 2024-08-10 PROCEDURE — 25000003 PHARM REV CODE 250: Performed by: INTERNAL MEDICINE

## 2024-08-10 PROCEDURE — 63600175 PHARM REV CODE 636 W HCPCS: Performed by: INTERNAL MEDICINE

## 2024-08-10 PROCEDURE — 80053 COMPREHEN METABOLIC PANEL: CPT | Performed by: INTERNAL MEDICINE

## 2024-08-10 PROCEDURE — 96375 TX/PRO/DX INJ NEW DRUG ADDON: CPT

## 2024-08-10 PROCEDURE — 85025 COMPLETE CBC W/AUTO DIFF WBC: CPT | Performed by: INTERNAL MEDICINE

## 2024-08-10 PROCEDURE — 81001 URINALYSIS AUTO W/SCOPE: CPT | Performed by: INTERNAL MEDICINE

## 2024-08-10 PROCEDURE — 99285 EMERGENCY DEPT VISIT HI MDM: CPT | Mod: 25

## 2024-08-10 PROCEDURE — 93005 ELECTROCARDIOGRAM TRACING: CPT

## 2024-08-10 PROCEDURE — 83690 ASSAY OF LIPASE: CPT | Performed by: INTERNAL MEDICINE

## 2024-08-10 PROCEDURE — 96374 THER/PROPH/DIAG INJ IV PUSH: CPT

## 2024-08-10 RX ORDER — MORPHINE SULFATE 2 MG/ML
4 INJECTION, SOLUTION INTRAMUSCULAR; INTRAVENOUS
Status: COMPLETED | OUTPATIENT
Start: 2024-08-10 | End: 2024-08-10

## 2024-08-10 RX ORDER — ONDANSETRON HYDROCHLORIDE 2 MG/ML
4 INJECTION, SOLUTION INTRAVENOUS
Status: COMPLETED | OUTPATIENT
Start: 2024-08-10 | End: 2024-08-10

## 2024-08-10 RX ADMIN — SODIUM CHLORIDE 1000 ML: 9 INJECTION, SOLUTION INTRAVENOUS at 11:08

## 2024-08-10 RX ADMIN — ONDANSETRON 4 MG: 2 INJECTION INTRAMUSCULAR; INTRAVENOUS at 10:08

## 2024-08-10 RX ADMIN — MORPHINE SULFATE 4 MG: 2 INJECTION, SOLUTION INTRAMUSCULAR; INTRAVENOUS at 10:08

## 2024-08-11 VITALS
OXYGEN SATURATION: 96 % | TEMPERATURE: 98 F | RESPIRATION RATE: 17 BRPM | BODY MASS INDEX: 38.75 KG/M2 | SYSTOLIC BLOOD PRESSURE: 131 MMHG | HEART RATE: 71 BPM | HEIGHT: 63 IN | WEIGHT: 218.69 LBS | DIASTOLIC BLOOD PRESSURE: 79 MMHG

## 2024-08-11 LAB
BACTERIA #/AREA URNS AUTO: ABNORMAL /HPF
BILIRUB UR QL STRIP.AUTO: NEGATIVE
CLARITY UR: CLEAR
COLOR UR AUTO: ABNORMAL
GLUCOSE UR QL STRIP: NORMAL
HGB UR QL STRIP: NEGATIVE
HYALINE CASTS #/AREA URNS LPF: ABNORMAL /LPF
KETONES UR QL STRIP: NEGATIVE
LEUKOCYTE ESTERASE UR QL STRIP: 25
MUCOUS THREADS URNS QL MICRO: ABNORMAL /LPF
NITRITE UR QL STRIP: NEGATIVE
OHS QRS DURATION: 80 MS
OHS QTC CALCULATION: 416 MS
PH UR STRIP: 6.5 [PH]
PROT UR QL STRIP: NEGATIVE
RBC #/AREA URNS AUTO: ABNORMAL /HPF
SP GR UR STRIP.AUTO: 1.01 (ref 1–1.03)
SQUAMOUS #/AREA URNS LPF: ABNORMAL /HPF
UROBILINOGEN UR STRIP-ACNC: NORMAL
WBC #/AREA URNS AUTO: ABNORMAL /HPF

## 2024-08-11 RX ORDER — BACLOFEN 10 MG/1
10 TABLET ORAL 2 TIMES DAILY PRN
Qty: 20 TABLET | Refills: 0 | Status: SHIPPED | OUTPATIENT
Start: 2024-08-11

## 2024-08-11 RX ORDER — DICLOFENAC SODIUM 50 MG/1
50 TABLET, DELAYED RELEASE ORAL 2 TIMES DAILY PRN
Qty: 20 TABLET | Refills: 0 | Status: SHIPPED | OUTPATIENT
Start: 2024-08-11

## 2024-08-11 NOTE — ED PROVIDER NOTES
Encounter Date: 8/10/2024       History     Chief Complaint   Patient presents with    Abdominal Pain     Pt in via [facility] c/o RUQ pain x2 weeks with it worsening today. Hx of cholecystectomy  20g to LFA 15 mg toradol IV given en route     Presents with RUQ pain for the last 2 weeks. States pain is achy, moderate to severe, worse with movement. Hx of cholecystectomy. Denies fever, vomiting, rash or injury, no urinary symptoms. Hx of DM, HTN.  Paramedics gave her toradol in route    The history is provided by the patient and the EMS personnel.     Review of patient's allergies indicates:  No Known Allergies  Past Medical History:   Diagnosis Date    Diabetes mellitus, type 2     Essential hypertension 2022    Headache     High cholesterol     Thyroid disease      Past Surgical History:   Procedure Laterality Date     SECTION      CHOLECYSTECTOMY      TUBAL LIGATION       Family History   Problem Relation Name Age of Onset    Heart failure Mother      Diabetes Mellitus Father       Social History     Tobacco Use    Smoking status: Never    Smokeless tobacco: Never   Substance Use Topics    Alcohol use: Not Currently     Comment: 1-2 times a year    Drug use: Never     Review of Systems   Gastrointestinal:  Positive for abdominal pain and nausea.       Physical Exam     Initial Vitals [08/10/24 2222]   BP Pulse Resp Temp SpO2   139/82 72 (!) 22 97.7 °F (36.5 °C) 98 %      MAP       --         Physical Exam    Nursing note and vitals reviewed.  Constitutional: She appears well-developed and well-nourished. No distress.   HENT:   Head: Normocephalic and atraumatic.   Mouth/Throat: Oropharynx is clear and moist.   Eyes: Conjunctivae and EOM are normal. Pupils are equal, round, and reactive to light.   Neck: Neck supple.   Normal range of motion.  Cardiovascular:  Normal rate, regular rhythm, normal heart sounds and intact distal pulses.           Pulmonary/Chest: Breath sounds normal.   Abdominal: Abdomen is  soft. Bowel sounds are normal. She exhibits no distension. There is abdominal tenderness (RUQ).   Pain worse with movement, No CVT There is guarding. There is no rebound.   Musculoskeletal:         General: No edema. Normal range of motion.      Cervical back: Normal range of motion and neck supple.     Neurological: She is alert and oriented to person, place, and time. She has normal strength.   Skin: Skin is warm and dry. No rash noted.   Psychiatric: Her behavior is normal.         ED Course   Procedures  Labs Reviewed   COMPREHENSIVE METABOLIC PANEL - Abnormal       Result Value    Sodium 140      Potassium 4.2      Chloride 106      CO2 24      Glucose 105 (*)     Blood Urea Nitrogen 16.5      Creatinine 1.21 (*)     Calcium 10.5 (*)     Protein Total 7.1      Albumin 3.9      Globulin 3.2      Albumin/Globulin Ratio 1.2      Bilirubin Total 0.4      ALP 78      ALT 28      AST 26      eGFR 53      Anion Gap 10.0      BUN/Creatinine Ratio 14     URINALYSIS, REFLEX TO URINE CULTURE - Abnormal    Color, UA Light-Yellow      Appearance, UA Clear      Specific Gravity, UA 1.012      pH, UA 6.5      Protein, UA Negative      Glucose, UA Normal      Ketones, UA Negative      Blood, UA Negative      Bilirubin, UA Negative      Urobilinogen, UA Normal      Nitrites, UA Negative      Leukocyte Esterase, UA 25 (*)     RBC, UA 0-5      WBC, UA 0-5      Bacteria, UA Occ (*)     Squamous Epithelial Cells, UA Few (*)     Mucous, UA Trace (*)     Hyaline Casts, UA None Seen     CBC WITH DIFFERENTIAL - Abnormal    WBC 4.26 (*)     RBC 4.19 (*)     Hgb 12.9      Hct 38.1      MCV 90.9      MCH 30.8      MCHC 33.9      RDW 13.5      Platelet 206      MPV 9.8      Neut % 49.2      Lymph % 37.3      Mono % 7.0      Eos % 5.6      Basophil % 0.7      Lymph # 1.59      Neut # 2.09 (*)     Mono # 0.30      Eos # 0.24      Baso # 0.03      IG# 0.01      IG% 0.2      NRBC% 0.0     LIPASE - Normal    Lipase Level 42     CBC W/ AUTO  DIFFERENTIAL    Narrative:     The following orders were created for panel order CBC auto differential.  Procedure                               Abnormality         Status                     ---------                               -----------         ------                     CBC with Differential[[id number]]       Abnormal            Final result                 Please view results for these tests on the individual orders.   EXTRA TUBES    Narrative:     The following orders were created for panel order EXTRA TUBES.  Procedure                               Abnormality         Status                     ---------                               -----------         ------                     Gold Top Hold[[id number]]                                                                Please view results for these tests on the individual orders.   GOLD TOP HOLD     EKG Readings: (Independently Interpreted)   Initial Reading: No STEMI. Rhythm: Normal Sinus Rhythm. Heart Rate: 62. Ectopy: No Ectopy. Conduction: Normal. ST Segments: Normal ST Segments. T Waves: Normal. Clinical Impression: Normal Sinus Rhythm       Imaging Results              CT Abdomen Pelvis  Without Contrast (Preliminary result)  Result time 08/10/24 23:39:10      Preliminary result by Otto Licea MD (08/10/24 23:39:10)                   Narrative:    START OF REPORT:  Technique: CT of the abdomen and pelvis was performed with axial images as well as sagittal and coronal reconstruction images without intravenous contrast.    Comparison: None available.    Clinical History: Flank pain, kidney stone suspected.    Dosage Information: Automated Exposure Control was utilized 570.18 mGy.cm.    Findings:  Lines and Tubes: None.  Thorax:  Lungs: The visualized lung bases appear unremarkable.  Pleura: No effusions or thickening.  Heart: The heart size is within normal limits.  Abdomen:  Abdominal Wall: No abdominal wall pathology is seen.  Liver: The liver  appears unremarkable.  Biliary System: No intrahepatic or extrahepatic biliary duct dilatation is seen.  Gallbladder: The gallbladder appears unremarkable.  Pancreas: The pancreas appears unremarkable.  Spleen: The spleen appears unremarkable.  Adrenals: The adrenal glands appear unremarkable.  Kidneys: The kidneys appear unremarkable with no stones cysts masses or hydronephrosis.  Aorta: There is minimal calcification of the abdominal aorta and its branches.  IVC: Unremarkable.  Bowel:  Esophagus: The visualized distal esophagus appears unremarkable.  Stomach: The stomach appears unremarkable.  Duodenum: Unremarkable appearing duodenum.  Small Bowel: The small bowel appears unremarkable.  Colon: Nondistended.  Appendix: The appendix appears unremarkable and is seen on Image 92, Series 2 through Image 94, Series 2.  Peritoneum: No intraperitoneal free air or ascites is seen.    Pelvis:  Bladder: The bladder appears unremarkable.  Female:  Uterus: The uterus appears unremarkable for age.  Ovaries: No adnexal masses are seen.    Bony structures:  Dorsal Spine: There is mild multilevel spondylosis of the visualized dorsal spine.  Bony Pelvis: There is mild degenerative change of the bilateral hips.      Impression:  1. No acute intraabdominal or pelvic solid organ or bowel pathology identified. Details and other findings as discussed above.                                         Medications   morphine injection 4 mg (4 mg Intravenous Given 8/10/24 2252)   ondansetron injection 4 mg (4 mg Intravenous Given 8/10/24 2251)   sodium chloride 0.9% bolus 1,000 mL 1,000 mL (1,000 mLs Intravenous New Bag 8/10/24 2315)     Medical Decision Making  Amount and/or Complexity of Data Reviewed  Labs: ordered. Decision-making details documented in ED Course.  Radiology: ordered and independent interpretation performed. Decision-making details documented in ED Course.  ECG/medicine tests: ordered and independent interpretation  performed. Decision-making details documented in ED Course.    Risk  Prescription drug management.      Additional MDM:   Differential Diagnosis:   Appendicitis, Diverticulitis, Pancreatitis, Pyelonephritis, AAA, Dissection, MI, Gastric Ulcer, Peptic Ulcer, Urinary retention, among others                                        Clinical Impression:  Final diagnoses:  [R10.9] Abdominal pain  [M79.18] Musculoskeletal pain (Primary)          ED Disposition Condition    Discharge Stable          ED Prescriptions       Medication Sig Dispense Start Date End Date Auth. Provider    baclofen (LIORESAL) 10 MG tablet Take 1 tablet (10 mg total) by mouth 2 (two) times daily as needed (muscle spasms). 20 tablet 8/11/2024 -- Calin Aguayo MD    diclofenac (VOLTAREN) 50 MG EC tablet Take 1 tablet (50 mg total) by mouth 2 (two) times daily as needed (Pain). 20 tablet 8/11/2024 -- Calin Aguayo MD          Follow-up Information       Follow up With Specialties Details Why Contact Info    [facility] Schedule an appointment as soon as possible for a visit in 2 weeks  [address] [facility] [address]  [phone]      [facility] Emergency Medicine  If symptoms worsen [address] [facility] [id number] [phone]             Calin Aguayo MD  08/11/24 0020

## 2024-08-11 NOTE — ED NOTES
Pt BIBA for upper RQ pain, on and off for several days, worse today, pt able to self ambulate to stretcher from ambulance sag[facility]

## 2024-08-15 DIAGNOSIS — R11.0 NAUSEA: ICD-10-CM

## 2024-08-16 RX ORDER — ONDANSETRON 4 MG/1
8 TABLET, ORALLY DISINTEGRATING ORAL EVERY 8 HOURS PRN
Qty: 30 TABLET | Refills: 3 | Status: SHIPPED | OUTPATIENT
Start: 2024-08-16 | End: 2025-08-16

## 2024-08-16 NOTE — TELEPHONE ENCOUNTER
Pt requesting refill for her  ondansetron (ZOFRAN-ODT) 4 MG TbDL . Noted last RX's by [facility]     LOV: 5/29/24        NOV:8/22/24

## 2024-08-19 ENCOUNTER — LAB VISIT (OUTPATIENT)
Dept: LAB | Facility: HOSPITAL | Age: 54
End: 2024-08-19
Attending: NURSE PRACTITIONER
Payer: OTHER GOVERNMENT

## 2024-08-19 DIAGNOSIS — I10 ESSENTIAL HYPERTENSION: Chronic | ICD-10-CM

## 2024-08-19 LAB
ALBUMIN SERPL-MCNC: 3.9 G/DL (ref 3.5–5)
ALBUMIN/GLOB SERPL: 1.2 RATIO (ref 1.1–2)
ALP SERPL-CCNC: 82 UNIT/L (ref 40–150)
ALT SERPL-CCNC: 34 UNIT/L (ref 0–55)
ANION GAP SERPL CALC-SCNC: 10 MEQ/L
AST SERPL-CCNC: 26 UNIT/L (ref 5–34)
BILIRUB SERPL-MCNC: 0.4 MG/DL
BUN SERPL-MCNC: 17.8 MG/DL (ref 9.8–20.1)
CALCIUM SERPL-MCNC: 10.5 MG/DL (ref 8.4–10.2)
CHLORIDE SERPL-SCNC: 104 MMOL/L (ref 98–107)
CO2 SERPL-SCNC: 27 MMOL/L (ref 22–29)
CREAT SERPL-MCNC: 1.17 MG/DL (ref 0.55–1.02)
CREAT/UREA NIT SERPL: 15
EST. AVERAGE GLUCOSE BLD GHB EST-MCNC: 137 MG/DL
GFR SERPLBLD CREATININE-BSD FMLA CKD-EPI: 55 ML/MIN/1.73/M2
GLOBULIN SER-MCNC: 3.3 GM/DL (ref 2.4–3.5)
GLUCOSE SERPL-MCNC: 96 MG/DL (ref 74–100)
HBA1C MFR BLD: 6.4 %
POTASSIUM SERPL-SCNC: 5.1 MMOL/L (ref 3.5–5.1)
PROT SERPL-MCNC: 7.2 GM/DL (ref 6.4–8.3)
SODIUM SERPL-SCNC: 141 MMOL/L (ref 136–145)
TSH SERPL-ACNC: 1.51 UIU/ML (ref 0.35–4.94)

## 2024-08-19 PROCEDURE — 84443 ASSAY THYROID STIM HORMONE: CPT

## 2024-08-19 PROCEDURE — 36415 COLL VENOUS BLD VENIPUNCTURE: CPT

## 2024-08-19 PROCEDURE — 80053 COMPREHEN METABOLIC PANEL: CPT

## 2024-08-19 PROCEDURE — 83036 HEMOGLOBIN GLYCOSYLATED A1C: CPT

## 2024-08-21 ENCOUNTER — OFFICE VISIT (OUTPATIENT)
Dept: INTERNAL MEDICINE | Facility: CLINIC | Age: 54
End: 2024-08-21
Payer: OTHER GOVERNMENT

## 2024-08-21 ENCOUNTER — PATIENT MESSAGE (OUTPATIENT)
Dept: ADMINISTRATIVE | Facility: HOSPITAL | Age: 54
End: 2024-08-21
Payer: OTHER GOVERNMENT

## 2024-08-21 VITALS
SYSTOLIC BLOOD PRESSURE: 119 MMHG | BODY MASS INDEX: 37.85 KG/M2 | RESPIRATION RATE: 18 BRPM | TEMPERATURE: 98 F | DIASTOLIC BLOOD PRESSURE: 61 MMHG | OXYGEN SATURATION: 96 % | HEIGHT: 63 IN | HEART RATE: 74 BPM | WEIGHT: 213.63 LBS

## 2024-08-21 DIAGNOSIS — E78.2 MIXED HYPERLIPIDEMIA: Chronic | ICD-10-CM

## 2024-08-21 DIAGNOSIS — R01.1 MURMUR: ICD-10-CM

## 2024-08-21 DIAGNOSIS — I10 ESSENTIAL HYPERTENSION: Chronic | ICD-10-CM

## 2024-08-21 DIAGNOSIS — E11.65 TYPE 2 DIABETES MELLITUS WITH HYPERGLYCEMIA, WITHOUT LONG-TERM CURRENT USE OF INSULIN: Primary | Chronic | ICD-10-CM

## 2024-08-21 DIAGNOSIS — E03.9 HYPOTHYROIDISM (ACQUIRED): Chronic | ICD-10-CM

## 2024-08-21 DIAGNOSIS — E66.01 CLASS 2 SEVERE OBESITY DUE TO EXCESS CALORIES WITH SERIOUS COMORBIDITY AND BODY MASS INDEX (BMI) OF 37.0 TO 37.9 IN ADULT: Chronic | ICD-10-CM

## 2024-08-21 PROBLEM — E66.09 CLASS 2 OBESITY DUE TO EXCESS CALORIES WITHOUT SERIOUS COMORBIDITY WITH BODY MASS INDEX (BMI) OF 38.0 TO 38.9 IN ADULT: Chronic | Status: RESOLVED | Noted: 2022-06-06 | Resolved: 2024-08-21

## 2024-08-21 PROBLEM — E66.812 CLASS 2 SEVERE OBESITY DUE TO EXCESS CALORIES WITH SERIOUS COMORBIDITY AND BODY MASS INDEX (BMI) OF 37.0 TO 37.9 IN ADULT: Chronic | Status: ACTIVE | Noted: 2024-05-06

## 2024-08-21 PROBLEM — E66.812 CLASS 2 OBESITY DUE TO EXCESS CALORIES WITHOUT SERIOUS COMORBIDITY WITH BODY MASS INDEX (BMI) OF 38.0 TO 38.9 IN ADULT: Chronic | Status: RESOLVED | Noted: 2022-06-06 | Resolved: 2024-08-21

## 2024-08-21 PROCEDURE — 99214 OFFICE O/P EST MOD 30 MIN: CPT | Mod: PBBFAC | Performed by: NURSE PRACTITIONER

## 2024-08-21 PROCEDURE — 99214 OFFICE O/P EST MOD 30 MIN: CPT | Mod: S$PBB,,, | Performed by: NURSE PRACTITIONER

## 2024-08-21 RX ORDER — ATORVASTATIN CALCIUM 40 MG/1
40 TABLET, FILM COATED ORAL DAILY
Qty: 90 TABLET | Refills: 1 | Status: SHIPPED | OUTPATIENT
Start: 2024-08-21

## 2024-08-21 RX ORDER — LEVOTHYROXINE SODIUM 100 UG/1
100 TABLET ORAL DAILY
Qty: 90 TABLET | Refills: 1 | Status: SHIPPED | OUTPATIENT
Start: 2024-08-21

## 2024-08-21 RX ORDER — FAMOTIDINE 40 MG/1
40 TABLET, FILM COATED ORAL DAILY PRN
Qty: 30 TABLET | Refills: 6 | Status: SHIPPED | OUTPATIENT
Start: 2024-08-21

## 2024-08-21 RX ORDER — METFORMIN HYDROCHLORIDE 1000 MG/1
1000 TABLET ORAL 2 TIMES DAILY
Qty: 180 TABLET | Refills: 1 | Status: SHIPPED | OUTPATIENT
Start: 2024-08-21

## 2024-08-21 RX ORDER — LISINOPRIL 20 MG/1
20 TABLET ORAL DAILY
Qty: 90 TABLET | Refills: 1 | Status: SHIPPED | OUTPATIENT
Start: 2024-08-21

## 2024-08-21 RX ORDER — DICLOFENAC SODIUM 50 MG/1
50 TABLET, DELAYED RELEASE ORAL 2 TIMES DAILY PRN
Qty: 20 TABLET | Refills: 0 | Status: SHIPPED | OUTPATIENT
Start: 2024-08-21

## 2024-08-21 RX ORDER — PIOGLITAZONEHYDROCHLORIDE 30 MG/1
30 TABLET ORAL DAILY
Qty: 90 TABLET | Refills: 1 | Status: SHIPPED | OUTPATIENT
Start: 2024-08-21

## 2024-08-21 RX ORDER — AMLODIPINE BESYLATE 10 MG/1
10 TABLET ORAL DAILY
Qty: 90 TABLET | Refills: 1 | Status: SHIPPED | OUTPATIENT
Start: 2024-08-21

## 2024-08-21 RX ORDER — LIRAGLUTIDE 6 MG/ML
1.2 INJECTION SUBCUTANEOUS DAILY
Qty: 18 ML | Refills: 2 | Status: SHIPPED | OUTPATIENT
Start: 2024-08-21

## 2024-08-21 NOTE — PROGRESS NOTES
Patient ID: [id number]     Chief Complaint: Diabetes and Abdominal Pain (C/O RUQ pain that radiate toward her back times 8 weeks)    HPI:     Scotty Moctezuma is a 53 y.o. female with diagnosis of HTN, HLD, DM2, Hypothyroidism, SUNCT Headaches, Obesity. Patient seen today for follow up. Patient last seen in clinic on 2024.  At previous appt, patient started on Victoza 0.6 mg daily and continued on Glimepiride 6 mg Qam, Metformin 1,000 mg bid, Actos 30 mg daily for uncontrolled DM2. Previous A1c 8.2. Patient states she checks her CBGs at home but not consistently. Patient states she does have hypoglycemia at times. Patient states she does take her medications as prescribed. Patient states mild nausea at times with Victoza.   Patient currently prescribed Atorvastatin for HLD.   Today, patient states RUQ pain that radiates to her back at times. Patient seen in ED, CT abdomen negative. Patient has Hx of cholecystectomy. Patient diagnosed with muscle strain, prescribed Baclofen and discharged home. Patient states medication makes her drowsy and does not help with pain. Patient states diclofenac helps with pain.   Patient denies any other acute complaints.     Patient followed by GYN clinic. Last appointment on 01/10/2024. Patient has follow up appointment scheduled for 01/15/2025.     Patient followed by Neurology Clinic. Last appointment on 2023. Dx: SUNCT, Stroke-like symptoms, Memory Loss. Plan: continue Lisinopril 20 mg daily, Lamotrigine  mg daily. Consider MagOx and/or Riboflavin at next visit. Patient has follow up appointment scheduled for 2024.     Review of patient's allergies indicates:  No Known Allergies    Breast Cancer Screening: MMG benign on 2024  Cervical Cancer Screenin2021  Colorectal Cancer Screening: Colonoscopy on 2022 with recommended repeat in 10 years  Diabetic Eye Exam: ordered 2024  Diabetic Foot Exam: 2024  Lung Cancer Screening:  N/A  Prostate Cancer Screening: N/A  AAA Screening: N/A  Osteoporosis Screening: deferred due to age  Medicare Wellness: N/A  Immunizations:   Immunization History   Administered Date(s) Administered    COVID-19 Vaccine 2022    COVID-19, MRNA, LN-S, PF (Pfizer) (Purple Cap) 2021, 2021    Influenza - Quadrivalent - PF *Preferred* (6 months and older) 2021, 2022, 2022    Tdap 2020    Zoster 2022     Past Surgical History:   Procedure Laterality Date     SECTION      CHOLECYSTECTOMY      TUBAL LIGATION       family history includes Diabetes Mellitus in her father; Heart failure in her mother.    Social History     Socioeconomic History    Marital status:     Number of children: 3   Tobacco Use    Smoking status: Never    Smokeless tobacco: Never   Substance and Sexual Activity    Alcohol use: Not Currently     Comment: 1-2 times a year    Drug use: Never    Sexual activity: Not Currently     Partners: Male   Social History Narrative    ** Merged History Encounter **          Social Determinants of Health     Financial Resource Strain: High Risk (2024)    Overall Financial Resource Strain (CARDIA)     Difficulty of Paying Living Expenses: Hard   Food Insecurity: No Food Insecurity (2024)    Hunger Vital Sign     Worried About Running Out of Food in the Last Year: Never true     Ran Out of Food in the Last Year: Never true   Transportation Needs: Unmet Transportation Needs (2024)    PRAPARE - Transportation     Lack of Transportation (Medical): Yes     Lack of Transportation (Non-Medical): Yes   Physical Activity: Unknown (2024)    Exercise Vital Sign     Days of Exercise per Week: 1 day   Recent Concern: Physical Activity - Insufficiently Active (2024)    Exercise Vital Sign     Days of Exercise per Week: 1 day     Minutes of Exercise per Session: 60 min   Stress: No Stress Concern Present (2024)    [employer] [place] [employer]  "Occupational Health - Occupational Stress Questionnaire     Feeling of Stress : Only a little   Housing Stability: Low Risk  (2/22/2024)    Housing Stability Vital Sign     Unable to Pay for Housing in the Last Year: No     Number of Places Lived in the Last Year: 1     Unstable Housing in the Last Year: No     Current Outpatient Medications   Medication Instructions    albuterol (PROVENTIL/VENTOLIN HFA) 90 mcg/actuation inhaler 2 puffs, Inhalation, Every 6 hours PRN    amLODIPine (NORVASC) 10 mg, Oral, Daily    atorvastatin (LIPITOR) 40 mg, Oral, Daily    baclofen (LIORESAL) 10 mg, Oral, 2 times daily PRN    diclofenac (VOLTAREN) 50 mg, Oral, 2 times daily PRN    EUTHYROX 100 mcg, Oral, Daily    famotidine (PEPCID) 40 mg, Oral, Daily PRN    lamotrigine XR (LAMICTAL XR) 100 mg, Oral, Daily    liraglutide 0.6 mg/0.1 mL (18 mg/3 mL) subq PNIJ (VICTOZA 2-LIEN) 1.2 mg, Subcutaneous, Daily    lisinopriL (PRINIVIL,ZESTRIL) 20 mg, Oral, Daily    metFORMIN (GLUCOPHAGE) 1,000 mg, Oral, 2 times daily    ondansetron (ZOFRAN-ODT) 8 mg, Oral, Every 8 hours PRN    pioglitazone (ACTOS) 30 mg, Oral, Daily       Subjective:     Review of Systems   Constitutional: Negative.    HENT: Negative.     Eyes: Negative.    Respiratory: Negative.     Cardiovascular: Negative.    Gastrointestinal:  Positive for abdominal pain and nausea.   Endocrine: Negative.    Genitourinary: Negative.    Musculoskeletal: Negative.    Skin: Negative.    Allergic/Immunologic: Negative.    Neurological: Negative.    Hematological: Negative.    Psychiatric/Behavioral: Negative.         Objective:     Visit Vitals  /61 (BP Location: Right arm, Patient Position: Sitting, BP Method: Large (Automatic))   Pulse 74   Temp 97.7 °F (36.5 °C) (Oral)   Resp 18   Ht 5' 2.99" (1.6 m)   Wt 96.9 kg (213 lb 9.6 oz)   LMP 10/30/2020 (Exact Date)   SpO2 96%   BMI 37.85 kg/m²       Physical Exam  Vitals reviewed.   Constitutional:       Appearance: Normal appearance. She is " obese.   HENT:      Head: Normocephalic and atraumatic.      Mouth/Throat:      Mouth: Mucous membranes are moist.      Pharynx: Oropharynx is clear.   Eyes:      Extraocular Movements: Extraocular movements intact.      Conjunctiva/sclera: Conjunctivae normal.      Pupils: Pupils are equal, round, and reactive to light.   Cardiovascular:      Rate and Rhythm: Normal rate and regular rhythm.      Heart sounds: Murmur heard.      Comments: Trace edema to BLE  Pulmonary:      Effort: Pulmonary effort is normal.      Breath sounds: Normal breath sounds.   Abdominal:      General: Bowel sounds are normal.   Musculoskeletal:         General: Normal range of motion.      Cervical back: Normal range of motion.   Skin:     General: Skin is warm and dry.   Neurological:      Mental Status: She is alert and oriented to person, place, and time.   Psychiatric:         Mood and Affect: Mood normal.         Behavior: Behavior normal.       Labs Reviewed:     Hematology:  Lab Results   Component Value Date    WBC 4.26 (L) 08/10/2024    HGB 12.9 08/10/2024    HCT 38.1 08/10/2024     08/10/2024     Chemistry:  Lab Results   Component Value Date     08/19/2024    K 5.1 08/19/2024    BUN 17.8 08/19/2024    CREATININE 1.17 (H) 08/19/2024    EGFRNORACEVR 55 08/19/2024    GLUCOSE 96 08/19/2024    CALCIUM 10.5 (H) 08/19/2024    ALKPHOS 82 08/19/2024    LABPROT 7.2 08/19/2024    ALBUMIN 3.9 08/19/2024    BILIDIR 0.1 12/03/2021    IBILI 0.30 12/03/2021    AST 26 08/19/2024    ALT 34 08/19/2024    MG 2.1 11/04/2019    PHOS 3.7 08/12/2019    BBRYMNWC25OL 47.7 11/11/2022      Lab Results   Component Value Date    HGBA1C 6.4 08/19/2024      Lipid Panel:  Lab Results   Component Value Date    CHOL 165 02/16/2024    HDL 67 (H) 02/16/2024    LDL 85.00 02/16/2024    TRIG 67 02/16/2024    TOTALCHOLEST 2 02/16/2024      Thyroid:  Lab Results   Component Value Date    TSH 1.511 08/19/2024    T3FREE 1.96 (L) 03/03/2020      Urine:  Lab  Results   Component Value Date    APPEARANCEUA Clear 08/10/2024    SGUA 1.012 08/10/2024    PROTEINUA Negative 08/10/2024    KETONESUA Negative 08/10/2024    LEUKOCYTESUR 25 (A) 08/10/2024    RBCUA 0-5 08/10/2024    WBCUA 0-5 08/10/2024    BACTERIA Occ (A) 08/10/2024    SQEPUA Few (A) 08/10/2024    HYALINECASTS None Seen 08/10/2024    CREATRANDUR 84.2 02/16/2024        Assessment:       ICD-10-CM ICD-9-CM   1. Type 2 diabetes mellitus with hyperglycemia, without long-term current use of insulin  E11.65 250.00     790.29   2. Mixed hyperlipidemia  E78.2 272.2   3. Essential hypertension  I10 401.9   4. Murmur  R01.1 785.2   5. Hypothyroidism (acquired)  E03.9 244.9   6. Class 2 severe obesity due to excess calories with serious comorbidity and body mass index (BMI) of 37.0 to 37.9 in adult  E66.01 278.01    Z68.37 V85.37       Plan:     1. Type 2 diabetes mellitus with hyperglycemia, without long-term current use of insulin  Continue Metformin 1,000 mg bid, Actos 30 mg daily  Stop Glimepiride due to hypoglycemia  Increase Victoza to 1.2 mg SQ daily  Encouraged home CBG monitoring.  Hypoglycemic episodes: denies  Body mass index is 37.85 kg/m².  Hemoglobin A1c   Date Value Ref Range Status   08/19/2024 6.4 <=7.0 % Final     Results for orders placed or performed in visit on 02/16/24   Microalbumin/Creatinine Ratio, Urine   Result Value Ref Range    Urine Microalbumin 29.4 <=30.0 ug/ml    Urine Creatinine 84.2 45.0 - 106.0 mg/dL    Microalbumin Creatinine Ratio 34.9 (H) 0.0 - 30.0 mg/gm Cr   On Atorvastatin  On Lisinopril  Weight Loss Encouraged  ADA Diet    2. Mixed hyperlipidemia  Continue Atorvastatin 40 mg daily  Weight loss encouraged  Low fat/high fiber diet  Increase physical activity  Cholesterol Total   Date Value Ref Range Status   02/16/2024 165 <=200 mg/dL Final     HDL Cholesterol   Date Value Ref Range Status   02/16/2024 67 (H) 35 - 60 mg/dL Final     Triglyceride   Date Value Ref Range Status    02/16/2024 67 37 - 140 mg/dL Final     LDL Cholesterol   Date Value Ref Range Status   02/16/2024 85.00 50.00 - 140.00 mg/dL Final     3. Essential hypertension  Vitals:    08/21/24 0939   BP: 119/61   Pulse: 74   Resp: 18   Temp: 97.7 °F (36.5 °C)      Results for orders placed or performed in visit on 02/16/24   Microalbumin/Creatinine Ratio, Urine   Result Value Ref Range    Urine Microalbumin 29.4 <=30.0 ug/ml    Urine Creatinine 84.2 45.0 - 106.0 mg/dL    Microalbumin Creatinine Ratio 34.9 (H) 0.0 - 30.0 mg/gm Cr     Results for orders placed or performed during the hospital encounter of 08/10/24   EKG 12-lead    Collection Time: 08/10/24 11:04 PM   Result Value Ref Range    QRS Duration 80 ms    [facility] QTC Calculation 416 ms    Narrative    Test Reason : R10.9,    Vent. Rate : 062 BPM     Atrial Rate : 062 BPM     P-R Int : 186 ms          QRS Dur : 080 ms      QT Int : 410 ms       P-R-T Axes : 064 003 036 degrees     QTc Int : 416 ms    Normal sinus rhythm  Normal ECG  When compared with ECG of 26-MAY-2024 09:27,  No significant change was found  Confirmed by Waldo Lobo MD ([id number]) on 8/11/2024 6:13:18 PM    Referred By: [email or web address]   SELF           Confirmed By:Waldo Lobo MD   Continue Amlodipine 10 mg daily, Lisinopril 10 mg daily  DASH diet  Encouraged home blood pressure monitoring    4. Murmur, cardiac  BLE edema noted  Echo ordered 05/22/2024, patient did not complete    5. Hypothyroidism  TSH level: 1.5  Continue Levothyroxine 100 mcg daily    6. Class 3 severe obesity due to excess calories with serious comorbidity and body mass index (BMI) of 40.0 to 44.9 in adult  Body mass index is 37.85 kg/m².  TSH   Date Value Ref Range Status   08/19/2024 1.511 0.350 - 4.940 uIU/mL Final     Vitamin D   Date Value Ref Range Status   11/11/2022 47.7 30.0 - 80.0 ng/mL Final     Hemoglobin A1c   Date Value Ref Range Status   08/19/2024 6.4 <=7.0 % Final   Sleep Study: none noted  Weight Loss  Encouraged  Increase Physical Activity      Follow up in about 4 months (around 12/21/2024) for Labs. In addition to their scheduled follow up, the patient has also been instructed to follow up on as needed basis.     LOGAN BoggsP

## 2024-09-05 DIAGNOSIS — E11.65 TYPE 2 DIABETES MELLITUS WITH HYPERGLYCEMIA, WITHOUT LONG-TERM CURRENT USE OF INSULIN: Chronic | ICD-10-CM

## 2024-09-07 ENCOUNTER — PATIENT MESSAGE (OUTPATIENT)
Dept: INTERNAL MEDICINE | Facility: CLINIC | Age: 54
End: 2024-09-07
Payer: OTHER GOVERNMENT

## 2024-09-09 RX ORDER — LIRAGLUTIDE 6 MG/ML
1.2 INJECTION SUBCUTANEOUS DAILY
Qty: 18 ML | Refills: 2 | OUTPATIENT
Start: 2024-09-09

## 2024-09-27 ENCOUNTER — OFFICE VISIT (OUTPATIENT)
Dept: INTERNAL MEDICINE | Facility: CLINIC | Age: 54
End: 2024-09-27
Payer: OTHER GOVERNMENT

## 2024-09-27 DIAGNOSIS — G44.059 SUNCT (SHORT UNILATERAL NEURALGIFORM HEADACHE, CONJUNCTIVAL INJ/TEAR): Chronic | ICD-10-CM

## 2024-09-27 DIAGNOSIS — E11.65 TYPE 2 DIABETES MELLITUS WITH HYPERGLYCEMIA, WITHOUT LONG-TERM CURRENT USE OF INSULIN: Primary | ICD-10-CM

## 2024-09-27 RX ORDER — GLIMEPIRIDE 4 MG/1
6 TABLET ORAL
Qty: 135 TABLET | Refills: 2 | Status: SHIPPED | OUTPATIENT
Start: 2024-09-27 | End: 2025-06-24

## 2024-09-27 RX ORDER — LAMOTRIGINE 100 MG/1
100 TABLET, EXTENDED RELEASE ORAL DAILY
Qty: 90 TABLET | Refills: 3 | Status: SHIPPED | OUTPATIENT
Start: 2024-09-27

## 2024-09-27 NOTE — PROGRESS NOTES
BOOGIE Irvin   [facility]  [address] [facility] [address]      PATIENT NAME: Scotty Moctezuma  : 1970  DATE: 24  MRN: [id number]      Patient PCP Information       None on File            Reason for Visit / Chief Complaint: Diabetes       History of Present Illness / Problem Focused Workflow     Scotty Moctezuma presents to the clinic with Diabetes     52 yo AAF here today to discuss Diabetes meds. Medical problems includes HTN, HLD, DM2, Hypothyroidism, SUNCT Headaches, Obesity.    2024  Np Gabriella patient here for inbetween OV after sending message regarding Victoza.  At last visit with PCP, Victoza dosage was increased.  Pt reports that she feels that the Victoza had been causing nausea since starting the medication; when she increased the dosage of the medicine the nausea increased and also vomiting; reports she has lost about 4 pounds last week. The pt reports that she stopped taking the Victoza and her symptoms have resolved however her blood sugar levels increased. She did attempt to restart however the side effects were worse so she stopped. Reports that in the past she was taking glimepiride 6 mg in the morning, however at the time she feels that she was not being mindful of her diet and her lifestyle NSAIDs has made a lot of changes.  Patient reports that she stopped her Victoza and has restarted the glimepiride that he had left over.  Reports without the Victoza her blood sugar was 117 this AM.  Discussed with patient blood glucose target a.m. fasting range of 120 which her is at goal at this time.  Patient is agreeable to restart glimepiride 6 mg every morning and to continue diet lifestyle modifications at this time.  I did discuss with patient potentially trying a new medication, however she would like to go back to glimepiride.  Patient is aware of office visit in December with new primary care provider,  labs ordered to match that visit.  Patient to reach out with any acute issues or concerns prior to then.  Denies any other issues today.          Review of Systems     Review of Systems   Constitutional:  Negative for activity change and unexpected weight change.   HENT:  Negative for hearing loss, rhinorrhea and trouble swallowing.    Eyes:  Negative for discharge and visual disturbance.   Respiratory:  Negative for chest tightness and wheezing.    Cardiovascular:  Negative for chest pain and palpitations.   Gastrointestinal:  Positive for vomiting. Negative for blood in stool, constipation and diarrhea.   Endocrine: Negative for polydipsia and polyuria.   Genitourinary:  Negative for difficulty urinating, dysuria, hematuria and menstrual problem.   Musculoskeletal:  Negative for arthralgias, joint swelling and neck pain.   Neurological:  Negative for weakness and headaches.   Psychiatric/Behavioral:  Negative for confusion and dysphoric mood.          Medications and Allergies     Medications  Current Outpatient Medications   Medication Instructions    albuterol (PROVENTIL/VENTOLIN HFA) 90 mcg/actuation inhaler 2 puffs, Inhalation, Every 6 hours PRN    amLODIPine (NORVASC) 10 mg, Oral, Daily    atorvastatin (LIPITOR) 40 mg, Oral, Daily    baclofen (LIORESAL) 10 mg, Oral, 2 times daily PRN    diclofenac (VOLTAREN) 50 mg, Oral, 2 times daily PRN    EUTHYROX 100 mcg, Oral, Daily    famotidine (PEPCID) 40 mg, Oral, Daily PRN    glimepiride (AMARYL) 6 mg, Oral, With breakfast, For Diabetes    lamotrigine XR (LAMICTAL XR) 100 mg, Oral, Daily    lisinopriL (PRINIVIL,ZESTRIL) 20 mg, Oral, Daily    metFORMIN (GLUCOPHAGE) 1,000 mg, Oral, 2 times daily    ondansetron (ZOFRAN-ODT) 8 mg, Oral, Every 8 hours PRN    pioglitazone (ACTOS) 30 mg, Oral, Daily         Allergies  Review of patient's allergies indicates:  No Known Allergies    Physical Examination     Visit Vitals  LMP 10/30/2020 (Exact Date)       Physical Exam  HENT:       Right Ear: Hearing normal.      Left Ear: Hearing normal.   Neurological:      Mental Status: She is alert and oriented to person, place, and time.   Psychiatric:         Mood and Affect: Mood normal.           Results     Lab Results   Component Value Date    WBC 4.26 (L) 08/10/2024    RBC 4.19 (L) 08/10/2024    HGB 12.9 08/10/2024    HCT 38.1 08/10/2024    MCV 90.9 08/10/2024    MCH 30.8 08/10/2024    MCHC 33.9 08/10/2024    RDW 13.5 08/10/2024     08/10/2024    MPV 9.8 08/10/2024     CMP  Sodium   Date Value Ref Range Status   08/19/2024 141 136 - 145 mmol/L Final     Potassium   Date Value Ref Range Status   08/19/2024 5.1 3.5 - 5.1 mmol/L Final     Chloride   Date Value Ref Range Status   08/19/2024 104 98 - 107 mmol/L Final     CO2   Date Value Ref Range Status   08/19/2024 27 22 - 29 mmol/L Final     Blood Urea Nitrogen   Date Value Ref Range Status   08/19/2024 17.8 9.8 - 20.1 mg/dL Final     Creatinine   Date Value Ref Range Status   08/19/2024 1.17 (H) 0.55 - 1.02 mg/dL Final     Calcium   Date Value Ref Range Status   08/19/2024 10.5 (H) 8.4 - 10.2 mg/dL Final     Albumin   Date Value Ref Range Status   08/19/2024 3.9 3.5 - 5.0 g/dL Final     Bilirubin Total   Date Value Ref Range Status   08/19/2024 0.4 <=1.5 mg/dL Final     ALP   Date Value Ref Range Status   08/19/2024 82 40 - 150 unit/L Final     AST   Date Value Ref Range Status   08/19/2024 26 5 - 34 unit/L Final     ALT   Date Value Ref Range Status   08/19/2024 34 0 - 55 unit/L Final     Estimated GFR-Non    Date Value Ref Range Status   07/01/2022 >60 mls/min/1.73/m2 Final     Lab Results   Component Value Date    CHOL 165 02/16/2024     Lab Results   Component Value Date    HDL 67 (H) 02/16/2024     Lab Results   Component Value Date    TRIG 67 02/16/2024     Lab Results   Component Value Date    VLDL 13 02/16/2024     Lab Results   Component Value Date    LDL 85.00 02/16/2024     Lab Results   Component Value Date     TSH 1.511 08/19/2024         Assessment        ICD-10-CM ICD-9-CM   1. Type 2 diabetes mellitus with hyperglycemia, without long-term current use of insulin  E11.65 250.00     790.29        Plan      Problem List Items Addressed This Visit          Endocrine    Type 2 diabetes mellitus with hyperglycemia, without long-term current use of insulin - Primary (Chronic)    Current Assessment & Plan     A1C at goal   Discontinue Victoza due to SE  Restart Glimepiride 6 mg q am  Continue RX Metformin 1000 mg BID & Actos 30 mg daily  F/U in 2 months with new provider  Continue glucose monitoring  Diabetes Medications               glimepiride (AMARYL) 4 MG tablet Take 1.5 tablets (6 mg total) by mouth daily with breakfast. For Diabetes    metFORMIN (GLUCOPHAGE) 1000 MG tablet Take 1 tablet (1,000 mg total) by mouth 2 (two) times daily.    pioglitazone (ACTOS) 30 MG tablet Take 1 tablet (30 mg total) by mouth once daily.          Follow ADA diet.  Avoid soda, simple sweets, and limit rice/pasta/bread/starches and consume brown options when possible.   Maintain healthy weight with BMI goal <30.   Perform aerobic exercise for 150 minutes per week (or 5 days a week for 30 minutes each day).   Examine feet daily.     Lab Results   Component Value Date    HGBA1C 6.4 08/19/2024              Relevant Medications    glimepiride (AMARYL) 4 MG tablet    Other Relevant Orders    Urinalysis, Reflex to Urine Culture    Microalbumin/Creatinine Ratio, Urine    Lipid Panel    Hemoglobin A1C    Comprehensive Metabolic Panel    CBC Auto Differential       Future Appointments   Date Time Provider Department Center   10/31/2024  8:00 AM PROVIDERS, ENMANU[facility] [place]   11/4/2024 10:30 AM Nannette Burt, JAME [facility] NEURO [place]   12/23/2024  8:20 AM Arcelia Claros, BOOGIE [facility] INTMED [place]   1/15/2025  8:30 AM Barbara Wilson, BOOGIE [facility] GYN [place]        Follow up if symptoms worsen or fail to  improve.      Signature:     [facility]  [address] [facility] [address]    Date of encounter: 9/27/24    The patient location is: home  The chief complaint leading to consultation is: Med Eval     Visit type: audiovisual    Face to Face time with patient: 25  30 minutes of total time spent on the encounter, which includes face to face time and non-face to face time preparing to see the patient (eg, review of tests), Obtaining and/or reviewing separately obtained history, Documenting clinical information in the electronic or other health record, Independently interpreting results (not separately reported) and communicating results to the patient/family/caregiver, or Care coordination (not separately reported).         Each patient to whom he or she provides medical services by telemedicine is:  (1) informed of the relationship between the physician and patient and the respective role of any other health care provider with respect to management of the patient; and (2) notified that he or she may decline to receive medical services by telemedicine and may withdraw from such care at any time.    Notes:

## 2024-09-27 NOTE — ASSESSMENT & PLAN NOTE
A1C at goal   Discontinue Victoza due to SE  Restart Glimepiride 6 mg q am  Continue RX Metformin 1000 mg BID & Actos 30 mg daily  F/U in 2 months with new provider  Continue glucose monitoring  Diabetes Medications               glimepiride (AMARYL) 4 MG tablet Take 1.5 tablets (6 mg total) by mouth daily with breakfast. For Diabetes    metFORMIN (GLUCOPHAGE) 1000 MG tablet Take 1 tablet (1,000 mg total) by mouth 2 (two) times daily.    pioglitazone (ACTOS) 30 MG tablet Take 1 tablet (30 mg total) by mouth once daily.          Follow ADA diet.  Avoid soda, simple sweets, and limit rice/pasta/bread/starches and consume brown options when possible.   Maintain healthy weight with BMI goal <30.   Perform aerobic exercise for 150 minutes per week (or 5 days a week for 30 minutes each day).   Examine feet daily.     Lab Results   Component Value Date    HGBA1C 6.4 08/19/2024

## 2024-10-31 ENCOUNTER — OFFICE VISIT (OUTPATIENT)
Dept: OPHTHALMOLOGY | Facility: CLINIC | Age: 54
End: 2024-10-31
Payer: OTHER GOVERNMENT

## 2024-10-31 VITALS — WEIGHT: 213 LBS | BODY MASS INDEX: 39.2 KG/M2 | HEIGHT: 62 IN

## 2024-10-31 DIAGNOSIS — H25.813 COMBINED FORM OF AGE-RELATED CATARACT, BOTH EYES: ICD-10-CM

## 2024-10-31 DIAGNOSIS — E11.65 TYPE 2 DIABETES MELLITUS WITH HYPERGLYCEMIA, WITHOUT LONG-TERM CURRENT USE OF INSULIN: Chronic | ICD-10-CM

## 2024-10-31 DIAGNOSIS — G44.059 SUNCT (SHORT UNILATERAL NEURALGIFORM HEADACHE, CONJUNCTIVAL INJ/TEAR): Primary | Chronic | ICD-10-CM

## 2024-10-31 DIAGNOSIS — E11.3299 MILD NONPROLIFERATIVE DIABETIC RETINOPATHY WITHOUT MACULAR EDEMA ASSOCIATED WITH TYPE 2 DIABETES MELLITUS, UNSPECIFIED LATERALITY: ICD-10-CM

## 2024-10-31 PROCEDURE — 92134 CPTRZ OPH DX IMG PST SGM RTA: CPT | Mod: PBBFAC,PN | Performed by: OPHTHALMOLOGY

## 2024-10-31 PROCEDURE — 92134 CPTRZ OPH DX IMG PST SGM RTA: CPT | Mod: PBBFAC,PN

## 2024-10-31 PROCEDURE — 99213 OFFICE O/P EST LOW 20 MIN: CPT | Mod: PBBFAC,PN

## 2024-11-04 ENCOUNTER — PATIENT MESSAGE (OUTPATIENT)
Dept: NEUROLOGY | Facility: CLINIC | Age: 54
End: 2024-11-04

## 2024-11-04 ENCOUNTER — OFFICE VISIT (OUTPATIENT)
Dept: NEUROLOGY | Facility: CLINIC | Age: 54
End: 2024-11-04
Payer: OTHER GOVERNMENT

## 2024-11-04 DIAGNOSIS — G44.059 SUNCT (SHORT UNILATERAL NEURALGIFORM HEADACHE, CONJUNCTIVAL INJ/TEAR): Primary | Chronic | ICD-10-CM

## 2024-11-04 DIAGNOSIS — E66.812 CLASS 2 SEVERE OBESITY WITH SERIOUS COMORBIDITY AND BODY MASS INDEX (BMI) OF 38.0 TO 38.9 IN ADULT, UNSPECIFIED OBESITY TYPE: ICD-10-CM

## 2024-11-04 DIAGNOSIS — E66.01 CLASS 2 SEVERE OBESITY WITH SERIOUS COMORBIDITY AND BODY MASS INDEX (BMI) OF 38.0 TO 38.9 IN ADULT, UNSPECIFIED OBESITY TYPE: ICD-10-CM

## 2024-11-04 PROCEDURE — 99214 OFFICE O/P EST MOD 30 MIN: CPT | Mod: 95,,, | Performed by: NURSE PRACTITIONER

## 2024-11-04 RX ORDER — LAMOTRIGINE 100 MG/1
100 TABLET, EXTENDED RELEASE ORAL DAILY
Qty: 90 TABLET | Refills: 3 | Status: SHIPPED | OUTPATIENT
Start: 2024-11-04

## 2024-11-04 NOTE — PROGRESS NOTES
"[facility] Neurology Follow Up Telemedicine Visit Note    Initial Visit Date: 11/9/2022  Last Visit Date: 5/6/2024  Current Visit Date:  11/04/2024    This is a real-time audio/video visit that was performed with the originating site at patient's home and the distant site, [facility]. Verbal consent to participate in interactive audio & video visit was obtained.    I discussed with the patient regarding the nature of our telehealth visits, that:    - Our sessions are not being recorded and that personal health information is protected  - Provider would evaluate the patient and recommend diagnostics and treatments based on my assessment  - [facility] will provide follow up care in person if/when the patient needs it.     Chief Complaint:     Chief Complaint   Patient presents with    SUNCT (short unilateral neuralgiform headache, conjunctival     Patient reports primarily stable, occasional breakthrough headache.     History of Present Illness:      This is 54 y.o. female with history of hypertension, hyperlipidemia, diabetes type 2, anxiety, hypothyroidism, obesity, transaminitis who was referred for SUNCT headache.   Also complaining of memory loss. During last visit, LTG  mg daily and lisinopril 20 mg PO daily were continued.     Today, Pt states she is doing very well. Pt states her last HA occurred after eye exam and bright light. SUNCT headaches are stable w/LTG. Rarely has "genuine" migraine w/nausea, but not enough to take medication. Has changed diet to renal diet as that is what her spouse is on. No longer on Victoza due to nausea, decreased appetite since then. Is now crocheting which she states is helpful for focus/memory/playing piano.     Age of Onset : 18 years old      Headache Description:   left retro-orbital, sharp, stabbing, seconds, lasting days, with OS lacrimation. Unclear if there is ptosis, conjunctival " injection, miosis/mydriasis:  On occasion.  Right frontal burning sensation: resolved      Frequency: as above.      Provocation Factors:  Pollen.     Risk Factors  - Family history of headache disorder: Yes son, mother, and sister with headache.   - History of focal CNS lesions: No  - History of CNS infections: No  - History head trauma: No  - History of underlying mood disorder: No  - History of sleep disorder: No   - Recreational drug use: No  - Tobacco use: No  - Alcohol use: Yes occasional  - Weight fluctuation: Not Applicable  - Isotretinoin or Tetracycline use:  Not Applicable  - Family planning and contraceptive use: Not Applicable    Medications:     Current Prophylactic  Lisinopril 20 mg once a day (11/28/2023 - present)  Lamotrigine  mg once a day (2/13/2023 to 3/1/2023) restarted (9/11/2023 - present) effective  Amlodipine 10mg PO daily (8/1/2023 - present)    Current Abortive  Tylenol - ineffective     Prior Prophylactic  Topiramate 100 mg once at night - ran out of refills  Gabapentin 100 mg twice a day (? - 5/31/2023) - ineffective    Prior Abortive  Ibuprofen PRN - ineffective   Indomethacin 25mg PO PRN (7/8/2024)  Devices:     - VNS:  - TNS  - TMS:     Procedures:     - Botox:  - PSG block:   - Occipital nerve block:     Labs:     Results for orders placed or performed in visit on 08/19/24   Comprehensive Metabolic Panel    Collection Time: 08/19/24  8:02 AM   Result Value Ref Range    Sodium 141 136 - 145 mmol/L    Potassium 5.1 3.5 - 5.1 mmol/L    Chloride 104 98 - 107 mmol/L    CO2 27 22 - 29 mmol/L    Glucose 96 74 - 100 mg/dL    Blood Urea Nitrogen 17.8 9.8 - 20.1 mg/dL    Creatinine 1.17 (H) 0.55 - 1.02 mg/dL    Calcium 10.5 (H) 8.4 - 10.2 mg/dL    Protein Total 7.2 6.4 - 8.3 gm/dL    Albumin 3.9 3.5 - 5.0 g/dL    Globulin 3.3 2.4 - 3.5 gm/dL    Albumin/Globulin Ratio 1.2 1.1 - 2.0 ratio    Bilirubin Total 0.4 <=1.5 mg/dL    ALP 82 40 - 150 unit/L    ALT 34 0 - 55 unit/L    AST 26 5 - 34  unit/L    eGFR 55 mL/min/1.73/m2    Anion Gap 10.0 mEq/L    BUN/Creatinine Ratio 15    Hemoglobin A1C    Collection Time: 08/19/24  8:02 AM   Result Value Ref Range    Hemoglobin A1c 6.4 <=7.0 %    Estimated Average Glucose 137.0 mg/dL   TSH    Collection Time: 08/19/24  8:02 AM   Result Value Ref Range    TSH 1.511 0.350 - 4.940 uIU/mL       Studies:     - MRI Brain with and without contrast August 12, 2019:  I have reviewed the study independently and with the patient.  Unremarkable.  - MRA Head w/o Myron:   - MRV Head w/o Myron:   - NCHCT:  - Lumbar Puncture:    Review of Systems:   As per HPI  All other systems reviewed and are negative.    Physical Exams:     Physical Exam  Nursing note reviewed.   Constitutional:       Appearance: Normal appearance.   HENT:      Head: Normocephalic.      Right Ear: External ear normal.      Left Ear: External ear normal.      Nose: Nose normal.      Mouth/Throat:      Mouth: Mucous membranes are moist.   Pulmonary:      Effort: Pulmonary effort is normal.   Abdominal:      General: There is no distension.      Tenderness: There is no guarding.      Comments: round   Musculoskeletal:         General: Normal range of motion.      Cervical back: Normal range of motion.   Skin:     Findings: No lesion or rash.   Neurological:      Mental Status: She is alert.     Telemedicine Comprehensive Neurological Exam:  Mental Status: Alert Oriented to Self, Date, and Place. Comprehension wnl. No dysarthria.   CN II - XII: No ptosis OU, EOMI without nystagmus, Hearing grossly intact, Face Symmetric, Tongue and Uvula midline, Trapezius symmetric bilateral.   Motor: no abnormal involuntary or voluntary movements, Fine finger movements wnl b/l, No pronator drift.   Sensory: unable to assess.  Reflexes: unable to assess.   Cerebellar: RAHM wnl b/l.  Romberg: absent.   Gait: normal, bilat arm swing intact    Assessment:     This is 54 y.o. female with history of hypertension, hyperlipidemia, diabetes  type 2, anxiety, hypothyroidism, obesity, transaminitis who was referred for SUNCT headache. States she is doing well on LTG XR, denies SUNCT HA. Had exacerbation during eye exam/bright light. Rarely has migraine, does not require abortive therapy. Sleeping well, anxiety controlled, currently crocheting which has been helpful for stress and piano.    Problem List Items Addressed This Visit          Neuro    SUNCT (short unilateral neuralgiform headache, conjunctival inj/tear) - Primary (Chronic)    Relevant Medications    lamotrigine XR (LAMICTAL XR) 100 mg TR24 XR tablet       Endocrine    Class 2 severe obesity with serious comorbidity and body mass index (BMI) of 38.0 to 38.9 in adult       Plan:     [] c/w Lisinopril 20 mg once a day via PCP  [] c/w Lamotrigine XR 100mg daily for SUNCT headache   [] start exercise program 30 min daily x 5 days weekly for overall health and wellness    RTC 6 months - TM okay    Headache education provided: good sleep hygiene and 7 hours of sleep per night, stress management, medication overuse education provided. Using more 3 OTC per week may worsen headaches, high intensity interval training has shown to reduce headache frequency. Low carb, high protein has shown to reduce headache frequency. Patient is instructed in keep headache diary.     I have explained the treatment plan, diagnosis, and prognosis to patient. All questions are answered to the best of my knowledge.     Face to face time 30 minutes, including documentation, chart review, counseling, education, review of test results, relevant medical records, and coordination of care.     Nannette Burt, GOLDY-C  General Neurology    11/04/2024

## 2024-12-16 ENCOUNTER — HOSPITAL ENCOUNTER (EMERGENCY)
Facility: HOSPITAL | Age: 54
Discharge: HOME | End: 2024-12-16
Attending: FAMILY MEDICINE
Payer: OTHER GOVERNMENT

## 2024-12-16 VITALS
WEIGHT: 214 LBS | TEMPERATURE: 98 F | RESPIRATION RATE: 20 BRPM | DIASTOLIC BLOOD PRESSURE: 83 MMHG | HEIGHT: 62 IN | OXYGEN SATURATION: 98 % | BODY MASS INDEX: 39.38 KG/M2 | HEART RATE: 63 BPM | SYSTOLIC BLOOD PRESSURE: 141 MMHG

## 2024-12-16 DIAGNOSIS — R07.9 CHEST PAIN: ICD-10-CM

## 2024-12-16 DIAGNOSIS — R07.89 CHEST WALL PAIN: ICD-10-CM

## 2024-12-16 LAB
ANION GAP SERPL CALC-SCNC: 9 MEQ/L
BASOPHILS # BLD AUTO: 0.03 X10(3)/MCL
BASOPHILS NFR BLD AUTO: 0.8 %
BUN SERPL-MCNC: 12.9 MG/DL (ref 9.8–20.1)
CALCIUM SERPL-MCNC: 10.1 MG/DL (ref 8.4–10.2)
CHLORIDE SERPL-SCNC: 104 MMOL/L (ref 98–107)
CO2 SERPL-SCNC: 26 MMOL/L (ref 22–29)
CREAT SERPL-MCNC: 1 MG/DL (ref 0.55–1.02)
CREAT/UREA NIT SERPL: 13
D DIMER PPP IA.FEU-MCNC: 0.72 UG/ML FEU (ref 0–0.5)
EOSINOPHIL # BLD AUTO: 0.26 X10(3)/MCL (ref 0–0.9)
EOSINOPHIL NFR BLD AUTO: 7 %
ERYTHROCYTE [DISTWIDTH] IN BLOOD BY AUTOMATED COUNT: 13.4 % (ref 11.5–17)
GFR SERPLBLD CREATININE-BSD FMLA CKD-EPI: >60 ML/MIN/1.73/M2
GLUCOSE SERPL-MCNC: 93 MG/DL (ref 74–100)
GLUCOSE SERPL-MCNC: 97 MG/DL (ref 70–110)
HCT VFR BLD AUTO: 41.1 % (ref 37–47)
HGB BLD-MCNC: 13.6 G/DL (ref 12–16)
IMM GRANULOCYTES # BLD AUTO: 0.01 X10(3)/MCL (ref 0–0.04)
IMM GRANULOCYTES NFR BLD AUTO: 0.3 %
INR PPP: 1
LYMPHOCYTES # BLD AUTO: 1.46 X10(3)/MCL (ref 0.6–4.6)
LYMPHOCYTES NFR BLD AUTO: 39 %
MCH RBC QN AUTO: 30.6 PG (ref 27–31)
MCHC RBC AUTO-ENTMCNC: 33.1 G/DL (ref 33–36)
MCV RBC AUTO: 92.6 FL (ref 80–94)
MONOCYTES # BLD AUTO: 0.21 X10(3)/MCL (ref 0.1–1.3)
MONOCYTES NFR BLD AUTO: 5.6 %
NEUTROPHILS # BLD AUTO: 1.77 X10(3)/MCL (ref 2.1–9.2)
NEUTROPHILS NFR BLD AUTO: 47.3 %
PLATELET # BLD AUTO: 181 X10(3)/MCL (ref 130–400)
PMV BLD AUTO: 10.1 FL (ref 7.4–10.4)
POCT GLUCOSE: 69 MG/DL (ref 70–110)
POTASSIUM SERPL-SCNC: 4.3 MMOL/L (ref 3.5–5.1)
PROTHROMBIN TIME: <10 SECONDS (ref 12.5–14.5)
RBC # BLD AUTO: 4.44 X10(6)/MCL (ref 4.2–5.4)
SODIUM SERPL-SCNC: 139 MMOL/L (ref 136–145)
TROPONIN I SERPL-MCNC: <0.01 NG/ML (ref 0–0.04)
TROPONIN I SERPL-MCNC: <0.01 NG/ML (ref 0–0.04)
WBC # BLD AUTO: 3.74 X10(3)/MCL (ref 4.5–11.5)

## 2024-12-16 PROCEDURE — 93005 ELECTROCARDIOGRAM TRACING: CPT

## 2024-12-16 PROCEDURE — 85610 PROTHROMBIN TIME: CPT | Performed by: FAMILY MEDICINE

## 2024-12-16 PROCEDURE — 85025 COMPLETE CBC W/AUTO DIFF WBC: CPT | Performed by: FAMILY MEDICINE

## 2024-12-16 PROCEDURE — 85379 FIBRIN DEGRADATION QUANT: CPT | Performed by: FAMILY MEDICINE

## 2024-12-16 PROCEDURE — 82962 GLUCOSE BLOOD TEST: CPT

## 2024-12-16 PROCEDURE — 80048 BASIC METABOLIC PNL TOTAL CA: CPT | Performed by: FAMILY MEDICINE

## 2024-12-16 PROCEDURE — 99285 EMERGENCY DEPT VISIT HI MDM: CPT | Mod: 25

## 2024-12-16 PROCEDURE — 84484 ASSAY OF TROPONIN QUANT: CPT | Performed by: FAMILY MEDICINE

## 2024-12-16 PROCEDURE — 93010 ELECTROCARDIOGRAM REPORT: CPT | Mod: ,,, | Performed by: INTERNAL MEDICINE

## 2024-12-16 RX ORDER — INDOMETHACIN 25 MG/1
25 CAPSULE ORAL
Qty: 15 CAPSULE | Refills: 0 | Status: SHIPPED | OUTPATIENT
Start: 2024-12-16

## 2024-12-16 RX ORDER — FAMOTIDINE 20 MG/1
20 TABLET, FILM COATED ORAL 2 TIMES DAILY
Qty: 20 TABLET | Refills: 0 | Status: SHIPPED | OUTPATIENT
Start: 2024-12-16 | End: 2025-12-16

## 2024-12-16 NOTE — ED PROVIDER NOTES
Encounter Date: 2024       History     Chief Complaint   Patient presents with    chest wall pain     Pt reports chest wall pain onset hour ago . Pain worst upon taking a deep breath     54-year-old chest wall pain said she has some pressure today lasted about an hour resolved has some burning in his chest also sharp stabbing pain no shortness a breath no diaphoresis history of coronary artery disease does have history of diabetes        Review of patient's allergies indicates:  No Known Allergies  Past Medical History:   Diagnosis Date    Diabetes mellitus, type 2     Essential hypertension 2022    Headache     High cholesterol     Thyroid disease      Past Surgical History:   Procedure Laterality Date     SECTION      CHOLECYSTECTOMY      TUBAL LIGATION       Family History   Problem Relation Name Age of Onset    Heart failure Mother      [place]'s disease Father      Glaucoma Maternal Aunt      Glaucoma Maternal Grandmother       Social History     Tobacco Use    Smoking status: Never    Smokeless tobacco: Never   Substance Use Topics    Alcohol use: Not Currently     Comment: 1-2 times a year    Drug use: Never     Review of Systems   Constitutional:  Negative for fever.   HENT:  Negative for sore throat.    Respiratory:  Negative for cough and shortness of breath.    Cardiovascular:  Positive for chest pain. Negative for palpitations and leg swelling.   Gastrointestinal:  Negative for nausea.   Genitourinary:  Negative for dysuria.   Musculoskeletal:  Negative for back pain.   Skin:  Negative for rash.   Neurological:  Negative for weakness.   Hematological:  Does not bruise/bleed easily.   All other systems reviewed and are negative.      Physical Exam     Initial Vitals [24 1208]   BP Pulse Resp Temp SpO2   133/80 98 18 98 °F (36.7 °C) 98 %      MAP       --         Physical Exam    Nursing note and vitals reviewed.  Constitutional: She appears well-developed and well-nourished. She  is active.   HENT:   Head: Normocephalic and atraumatic.   Eyes: Conjunctivae, EOM and lids are normal. Pupils are equal, round, and reactive to light.   Neck: Trachea normal and phonation normal. Neck supple. No thyroid mass present.   Normal range of motion.  Cardiovascular:  Normal rate, regular rhythm, normal heart sounds and normal pulses.           Pulmonary/Chest: Breath sounds normal. She exhibits tenderness.   Abdominal: Abdomen is soft. Bowel sounds are normal.   Musculoskeletal:         General: Normal range of motion.      Cervical back: Normal range of motion and neck supple.     Neurological: She is alert and oriented to person, place, and time. She has normal strength and normal reflexes.   Skin: Skin is warm and intact.   Psychiatric: She has a normal mood and affect. Her speech is normal and behavior is normal. Judgment and thought content normal. Cognition and memory are normal.         ED Course   Procedures  Labs Reviewed   PROTIME-INR - Abnormal       Result Value    PT <10.0 (*)     INR 1.0     D DIMER, QUANTITATIVE - Abnormal    D-Dimer 0.72 (*)    CBC WITH DIFFERENTIAL - Abnormal    WBC 3.74 (*)     RBC 4.44      Hgb 13.6      Hct 41.1      MCV 92.6      MCH 30.6      MCHC 33.1      RDW 13.4      Platelet 181      MPV 10.1      Neut % 47.3      Lymph % 39.0      Mono % 5.6      Eos % 7.0      Basophil % 0.8      Lymph # 1.46      Neut # 1.77 (*)     Mono # 0.21      Eos # 0.26      Baso # 0.03      IG# 0.01      IG% 0.3     POCT GLUCOSE - Abnormal    POCT Glucose 69 (*)    TROPONIN I - Normal    Troponin-I <0.010     TROPONIN I - Normal    Troponin-I <0.010     BASIC METABOLIC PANEL    Sodium 139      Potassium 4.3      Chloride 104      CO2 26      Glucose 93      Blood Urea Nitrogen 12.9      Creatinine 1.00      BUN/Creatinine Ratio 13      Calcium 10.1      Anion Gap 9.0      eGFR >60     CBC W/ AUTO DIFFERENTIAL    Narrative:     The following orders were created for panel order CBC Auto  Differential.  Procedure                               Abnormality         Status                     ---------                               -----------         ------                     CBC with Differential[[id number]]       Abnormal            Final result                 Please view results for these tests on the individual orders.   POCT GLUCOSE, HAND-HELD DEVICE    POC Glucose 97     POCT GLUCOSE, HAND-HELD DEVICE     EKG Readings: (Independently Interpreted)   Initial Reading: No STEMI. Rhythm: Normal Sinus Rhythm. Heart Rate: 62. Ectopy: No Ectopy. Conduction: Normal. ST Segments: Normal ST Segments. T Waves: Normal. Clinical Impression: Normal Sinus Rhythm       Imaging Results              X-Ray Chest PA And Lateral (Final result)  Result time 12/16/24 13:20:34      Final result by Donald Alejandre MD (12/16/24 13:20:34)                   Impression:      No acute chest disease is identified.      Electronically signed by: Donald Alejandre  Date:    12/16/2024  Time:    13:20               Narrative:    EXAMINATION:  XR CHEST PA AND LATERAL    CLINICAL HISTORY:  , Chest pain, unspecified.    COMPARISON:  May 26, 2024    FINDINGS:  No alveolar consolidation, effusion, or pneumothorax is seen.   The thoracic aorta is normal  cardiac silhouette, central pulmonary vessels and mediastinum are normal in size and are grossly unremarkable.   visualized osseous structures are grossly unremarkable.                                       Medications - No data to display  Medical Decision Making  54-year-old chest wall pain said she has some pressure today lasted about an hour resolved has some burning in his chest also sharp stabbing pain no shortness a breath no diaphoresis history of coronary artery disease does have history of diabetes          Amount and/or Complexity of Data Reviewed  Labs: ordered. Decision-making details documented in ED Course.  Radiology: ordered and independent interpretation  performed.    Risk  Prescription drug management.  Risk Details: Differential diagnosis STEMI NSTEMI pleurisy pneumonia costochondritis anxiety esophageal reflux               ED Course as of 12/16/24 1508   Mon Dec 16, 2024   1357 D-Dimer(!): 0.72 [BL]   1357 Troponin I: <0.010 [BL]   1357 CO2: 26 [BL]   1357 Glucose: 93 [BL]   1357 BUN: 12.9 [BL]   1507 Troponin I: <0.010 [BL]      ED Course User Index  [BL] Satya Light MD                           Clinical Impression:  Final diagnoses:  [R07.89] Chest wall pain  [R07.9] Chest pain          ED Disposition Condition    Discharge Stable          ED Prescriptions       Medication Sig Dispense Start Date End Date Auth. Provider    famotidine (PEPCID) 20 MG tablet Take 1 tablet (20 mg total) by mouth 2 (two) times daily. 20 tablet 12/16/2024 12/16/2025 Satya Light MD    indomethacin (INDOCIN) 25 MG capsule Take 1 capsule (25 mg total) by mouth 3 (three) times daily with meals. 15 capsule 12/16/2024 -- Satya Light MD          Follow-up Information       Follow up With Specialties Details Why Contact Info    [facility] - Emergency Dept Emergency Medicine  As needed [address] [facility] [address] [phone]             Satya Light MD  12/16/24 [id number]

## 2024-12-17 LAB — POCT GLUCOSE: 97 MG/DL (ref 70–110)

## 2024-12-19 LAB
OHS QRS DURATION: 84 MS
OHS QTC CALCULATION: 418 MS

## 2024-12-22 ENCOUNTER — PATIENT MESSAGE (OUTPATIENT)
Dept: ADMINISTRATIVE | Facility: OTHER | Age: 54
End: 2024-12-22
Payer: OTHER GOVERNMENT

## 2024-12-23 ENCOUNTER — OFFICE VISIT (OUTPATIENT)
Dept: INTERNAL MEDICINE | Facility: CLINIC | Age: 54
End: 2024-12-23
Payer: OTHER GOVERNMENT

## 2024-12-23 ENCOUNTER — LAB VISIT (OUTPATIENT)
Dept: LAB | Facility: HOSPITAL | Age: 54
End: 2024-12-23
Attending: NURSE PRACTITIONER
Payer: OTHER GOVERNMENT

## 2024-12-23 VITALS
OXYGEN SATURATION: 100 % | DIASTOLIC BLOOD PRESSURE: 80 MMHG | HEIGHT: 62 IN | RESPIRATION RATE: 18 BRPM | HEART RATE: 73 BPM | WEIGHT: 220.69 LBS | BODY MASS INDEX: 40.61 KG/M2 | TEMPERATURE: 98 F | SYSTOLIC BLOOD PRESSURE: 126 MMHG

## 2024-12-23 DIAGNOSIS — E03.9 HYPOTHYROIDISM (ACQUIRED): Chronic | ICD-10-CM

## 2024-12-23 DIAGNOSIS — E78.2 MIXED HYPERLIPIDEMIA: ICD-10-CM

## 2024-12-23 DIAGNOSIS — E66.813 CLASS 3 SEVERE OBESITY DUE TO EXCESS CALORIES WITH SERIOUS COMORBIDITY AND BODY MASS INDEX (BMI) OF 40.0 TO 44.9 IN ADULT: ICD-10-CM

## 2024-12-23 DIAGNOSIS — E66.01 CLASS 3 SEVERE OBESITY DUE TO EXCESS CALORIES WITH SERIOUS COMORBIDITY AND BODY MASS INDEX (BMI) OF 40.0 TO 44.9 IN ADULT: ICD-10-CM

## 2024-12-23 DIAGNOSIS — E11.65 TYPE 2 DIABETES MELLITUS WITH HYPERGLYCEMIA, WITHOUT LONG-TERM CURRENT USE OF INSULIN: ICD-10-CM

## 2024-12-23 DIAGNOSIS — N18.31 STAGE 3A CHRONIC KIDNEY DISEASE: ICD-10-CM

## 2024-12-23 DIAGNOSIS — J30.89 NON-SEASONAL ALLERGIC RHINITIS, UNSPECIFIED TRIGGER: ICD-10-CM

## 2024-12-23 DIAGNOSIS — Z23 IMMUNIZATION DUE: ICD-10-CM

## 2024-12-23 DIAGNOSIS — I10 ESSENTIAL HYPERTENSION: ICD-10-CM

## 2024-12-23 DIAGNOSIS — Z76.89 ENCOUNTER TO ESTABLISH CARE: Primary | ICD-10-CM

## 2024-12-23 LAB
ALBUMIN SERPL-MCNC: 3.6 G/DL (ref 3.5–5)
ALBUMIN/GLOB SERPL: 1.1 RATIO (ref 1.1–2)
ALP SERPL-CCNC: 72 UNIT/L (ref 40–150)
ALT SERPL-CCNC: 30 UNIT/L (ref 0–55)
ANION GAP SERPL CALC-SCNC: 5 MEQ/L
AST SERPL-CCNC: 25 UNIT/L (ref 5–34)
BACTERIA #/AREA URNS AUTO: ABNORMAL /HPF
BASOPHILS # BLD AUTO: 0.03 X10(3)/MCL
BASOPHILS NFR BLD AUTO: 0.8 %
BILIRUB SERPL-MCNC: 0.3 MG/DL
BILIRUB UR QL STRIP.AUTO: NEGATIVE
BUN SERPL-MCNC: 16 MG/DL (ref 9.8–20.1)
CALCIUM SERPL-MCNC: 10 MG/DL (ref 8.4–10.2)
CHLORIDE SERPL-SCNC: 106 MMOL/L (ref 98–107)
CHOLEST SERPL-MCNC: 140 MG/DL
CHOLEST/HDLC SERPL: 2 {RATIO} (ref 0–5)
CLARITY UR: CLEAR
CO2 SERPL-SCNC: 28 MMOL/L (ref 22–29)
COLOR UR AUTO: ABNORMAL
CREAT SERPL-MCNC: 1.2 MG/DL (ref 0.55–1.02)
CREAT UR-MCNC: 152.9 MG/DL (ref 45–106)
CREAT/UREA NIT SERPL: 13
EOSINOPHIL # BLD AUTO: 0.23 X10(3)/MCL (ref 0–0.9)
EOSINOPHIL NFR BLD AUTO: 6.1 %
ERYTHROCYTE [DISTWIDTH] IN BLOOD BY AUTOMATED COUNT: 13.2 % (ref 11.5–17)
EST. AVERAGE GLUCOSE BLD GHB EST-MCNC: 171.4 MG/DL
GFR SERPLBLD CREATININE-BSD FMLA CKD-EPI: 53 ML/MIN/1.73/M2
GLOBULIN SER-MCNC: 3.2 GM/DL (ref 2.4–3.5)
GLUCOSE SERPL-MCNC: 185 MG/DL (ref 74–100)
GLUCOSE UR QL STRIP: NORMAL
HBA1C MFR BLD: 7.6 %
HCT VFR BLD AUTO: 39.1 % (ref 37–47)
HDLC SERPL-MCNC: 57 MG/DL (ref 35–60)
HGB BLD-MCNC: 12.8 G/DL (ref 12–16)
HGB UR QL STRIP: NEGATIVE
HYALINE CASTS #/AREA URNS LPF: ABNORMAL /LPF
IMM GRANULOCYTES # BLD AUTO: 0.01 X10(3)/MCL (ref 0–0.04)
IMM GRANULOCYTES NFR BLD AUTO: 0.3 %
KETONES UR QL STRIP: NEGATIVE
LDLC SERPL CALC-MCNC: 74 MG/DL (ref 50–140)
LEUKOCYTE ESTERASE UR QL STRIP: 25
LYMPHOCYTES # BLD AUTO: 1.52 X10(3)/MCL (ref 0.6–4.6)
LYMPHOCYTES NFR BLD AUTO: 40.1 %
MCH RBC QN AUTO: 30.7 PG (ref 27–31)
MCHC RBC AUTO-ENTMCNC: 32.7 G/DL (ref 33–36)
MCV RBC AUTO: 93.8 FL (ref 80–94)
MICROALBUMIN UR-MCNC: 16.4 UG/ML
MICROALBUMIN/CREAT RATIO PNL UR: 10.7 MG/GM CR (ref 0–30)
MONOCYTES # BLD AUTO: 0.2 X10(3)/MCL (ref 0.1–1.3)
MONOCYTES NFR BLD AUTO: 5.3 %
MUCOUS THREADS URNS QL MICRO: ABNORMAL /LPF
NEUTROPHILS # BLD AUTO: 1.8 X10(3)/MCL (ref 2.1–9.2)
NEUTROPHILS NFR BLD AUTO: 47.4 %
NITRITE UR QL STRIP: NEGATIVE
NRBC BLD AUTO-RTO: 0 %
PH UR STRIP: 5 [PH]
PLATELET # BLD AUTO: 189 X10(3)/MCL (ref 130–400)
PMV BLD AUTO: 10.2 FL (ref 7.4–10.4)
POTASSIUM SERPL-SCNC: 4.5 MMOL/L (ref 3.5–5.1)
PROT SERPL-MCNC: 6.8 GM/DL (ref 6.4–8.3)
PROT UR QL STRIP: NEGATIVE
RBC # BLD AUTO: 4.17 X10(6)/MCL (ref 4.2–5.4)
RBC #/AREA URNS AUTO: ABNORMAL /HPF
SODIUM SERPL-SCNC: 139 MMOL/L (ref 136–145)
SP GR UR STRIP.AUTO: 1.02 (ref 1–1.03)
SQUAMOUS #/AREA URNS LPF: ABNORMAL /HPF
TRIGL SERPL-MCNC: 44 MG/DL (ref 37–140)
TSH SERPL-ACNC: 3.75 UIU/ML (ref 0.35–4.94)
UROBILINOGEN UR STRIP-ACNC: NORMAL
VLDLC SERPL CALC-MCNC: 9 MG/DL
WBC # BLD AUTO: 3.79 X10(3)/MCL (ref 4.5–11.5)
WBC #/AREA URNS AUTO: ABNORMAL /HPF

## 2024-12-23 PROCEDURE — 84443 ASSAY THYROID STIM HORMONE: CPT

## 2024-12-23 PROCEDURE — 99214 OFFICE O/P EST MOD 30 MIN: CPT | Mod: S$PBB,,,

## 2024-12-23 PROCEDURE — 80061 LIPID PANEL: CPT

## 2024-12-23 PROCEDURE — 83036 HEMOGLOBIN GLYCOSYLATED A1C: CPT

## 2024-12-23 PROCEDURE — 90471 IMMUNIZATION ADMIN: CPT | Mod: PBBFAC

## 2024-12-23 PROCEDURE — 36415 COLL VENOUS BLD VENIPUNCTURE: CPT

## 2024-12-23 PROCEDURE — 85025 COMPLETE CBC W/AUTO DIFF WBC: CPT

## 2024-12-23 PROCEDURE — 90677 PCV20 VACCINE IM: CPT | Mod: PBBFAC

## 2024-12-23 PROCEDURE — 82043 UR ALBUMIN QUANTITATIVE: CPT

## 2024-12-23 PROCEDURE — 81001 URINALYSIS AUTO W/SCOPE: CPT

## 2024-12-23 PROCEDURE — 99215 OFFICE O/P EST HI 40 MIN: CPT | Mod: PBBFAC

## 2024-12-23 PROCEDURE — 80053 COMPREHEN METABOLIC PANEL: CPT

## 2024-12-23 RX ORDER — GLIMEPIRIDE 4 MG/1
8 TABLET ORAL
Qty: 180 TABLET | Refills: 3 | Status: SHIPPED | OUTPATIENT
Start: 2024-12-23 | End: 2025-12-23

## 2024-12-23 RX ORDER — ALBUTEROL SULFATE 90 UG/1
2 INHALANT RESPIRATORY (INHALATION) EVERY 6 HOURS PRN
Qty: 18 G | Refills: 2 | Status: SHIPPED | OUTPATIENT
Start: 2024-12-23 | End: 2025-12-23

## 2024-12-23 RX ADMIN — PNEUMOCOCCAL 20-VALENT CONJUGATE VACCINE 0.5 ML
2.2; 2.2; 2.2; 2.2; 2.2; 2.2; 2.2; 2.2; 2.2; 2.2; 2.2; 2.2; 2.2; 2.2; 2.2; 2.2; 4.4; 2.2; 2.2; 2.2 INJECTION, SUSPENSION INTRAMUSCULAR at 08:12

## 2024-12-23 NOTE — PROGRESS NOTES
"    PATIENT NAME: Scotty Moctezuma  : 1970  DATE: 24  MRN: [id number]          Reason for Visit/Chief Complaint   Establish Care, Diabetes, and Results       History of Present Illness (HPI)     Scotty Moctezuma is a 54 y.o. Black female presenting in clinic today Establish Care, Diabetes, and Results. PMH: HTN, HLD, DM2, Hypothyroidism, SUNCT Headaches, Obesity.  Previous PCP: BOOGIE Ivan - last office visit on 2024 (with Princess Perez NP).     She is followed by:  [facility] GYN clinic - last office visit on 1/10/2024.  [facility] neurology clinic for SUNCT, stroke like symptoms, memory loss. Last office visit n 2024.Plan: continue Lisinopril 20 mg daily, Lamotrigine  mg daily.      All pertinent labs dated 2024 reviewed and discussed with patient.     At last office visit, victoza was discontinued due to side effects. She was restarted on glimepiride 6 mg, she was to continue metformin 1000 mg BID and actos 30 mg daily. Today, [id number]-8.6 which is increased from 6.4 4 months ago. She reports checking blood sugar at home. States it runs "150-200." States she ate fried rice last night.     Amenable to pneumococcal vaccine. Requesting refill on albuterol inhaler - denies any current SOB or wheezing. Denies smoking, drinking, or illicit drug use. Denies chest pain, shortness of breath, cough, headache, dizziness, weakness, abdominal pain, nausea, vomiting, diarrhea, constipation, dysuria, depression, anxiety, SI, and HI.     Review of Systems     Review of Systems   Constitutional: Negative.    HENT: Negative.     Eyes: Negative.    Respiratory: Negative.     Cardiovascular: Negative.    Gastrointestinal: Negative.    Endocrine: Negative.    Genitourinary: Negative.    Musculoskeletal: Negative.    Skin: Negative.    Allergic/Immunologic: Negative.    Neurological: Negative.    Hematological: Negative.    Psychiatric/Behavioral: Negative.     All other systems reviewed and are " "negative.      Medical / Social / Family History     Past Medical History:   Diagnosis Date    Diabetes mellitus, type 2     Essential hypertension 2022    Headache     High cholesterol     Thyroid disease          Past Surgical History:   Procedure Laterality Date     SECTION      CHOLECYSTECTOMY      TUBAL LIGATION           Social History  Scotty Valdez  reports that she has never smoked. She has never used smokeless tobacco. She reports that she does not currently use alcohol. She reports that she does not use drugs.    Family History  Scotty Valdez family history includes Amelia's disease in her father; Glaucoma in her maternal aunt and maternal grandmother; Heart failure in her mother.    Medications and Allergies     Medications  Current Outpatient Medications   Medication Instructions    albuterol (PROVENTIL/VENTOLIN HFA) 90 mcg/actuation inhaler 2 puffs, Every 6 hours PRN    amLODIPine (NORVASC) 10 mg, Oral, Daily    atorvastatin (LIPITOR) 40 mg, Oral, Daily    EUTHYROX 100 mcg, Oral, Daily    glimepiride (AMARYL) 8 mg, Oral, With breakfast, For Diabetes    lamotrigine XR (LAMICTAL XR) 100 mg, Oral, Daily    lisinopriL (PRINIVIL,ZESTRIL) 20 mg, Oral, Daily    metFORMIN (GLUCOPHAGE) 1,000 mg, Oral, 2 times daily    pioglitazone (ACTOS) 30 mg, Oral, Daily       Allergies  Review of patient's allergies indicates:  No Known Allergies    Physical Examination   Visit Vitals  /80 (BP Location: Right arm, Patient Position: Sitting)   Pulse 73   Temp 97.9 °F (36.6 °C) (Oral)   Resp 18   Ht 5' 2" (1.575 m)   Wt 100.1 kg (220 lb 11.2 oz)   LMP 10/30/2020 (Exact Date)   SpO2 100%   BMI 40.37 kg/m²     Physical Exam  Vitals reviewed.   Constitutional:       Appearance: Normal appearance. She is obese.   HENT:      Head: Normocephalic and atraumatic.      Right Ear: External ear normal.      Left Ear: External ear normal.      Nose: Nose normal.      Mouth/Throat:      Mouth: Mucous " membranes are moist.      Pharynx: Oropharynx is clear.   Eyes:      Extraocular Movements: Extraocular movements intact.      Conjunctiva/sclera: Conjunctivae normal.      Pupils: Pupils are equal, round, and reactive to light.   Cardiovascular:      Rate and Rhythm: Normal rate and regular rhythm.      Pulses: Normal pulses.      Heart sounds: Normal heart sounds.   Pulmonary:      Effort: Pulmonary effort is normal.      Breath sounds: Normal breath sounds.   Abdominal:      General: Bowel sounds are normal.      Palpations: Abdomen is soft.   Musculoskeletal:         General: Normal range of motion.      Cervical back: Normal range of motion and neck supple.   Skin:     General: Skin is warm and dry.      Capillary Refill: Capillary refill takes less than 2 seconds.   Neurological:      General: No focal deficit present.      Mental Status: She is alert and oriented to person, place, and time.   Psychiatric:         Mood and Affect: Mood normal.         Behavior: Behavior normal.         Thought Content: Thought content normal.         Judgment: Judgment normal.           Results     Lab Results   Component Value Date    WBC 3.79 (L) 12/23/2024    RBC 4.17 (L) 12/23/2024    HGB 12.8 12/23/2024    HCT 39.1 12/23/2024    MCV 93.8 12/23/2024    MCH 30.7 12/23/2024    MCHC 32.7 (L) 12/23/2024    RDW 13.2 12/23/2024     12/23/2024    MPV 10.2 12/23/2024      Lab Results   Component Value Date     12/23/2024    K 4.5 12/23/2024    CO2 28 12/23/2024    GLUCOSE 185 (H) 12/23/2024    BUN 16.0 12/23/2024    CREATININE 1.20 (H) 12/23/2024    LABPROT 6.8 12/23/2024    ALBUMIN 3.6 12/23/2024    BILITOT 0.3 12/23/2024    ALKPHOS 72 12/23/2024    AST 25 12/23/2024    ALT 30 12/23/2024    AGAP 5.0 12/23/2024    EGFRNORACEVR 53 12/23/2024     Lab Results   Component Value Date    TSH 3.752 12/23/2024     Lab Results   Component Value Date    CHOL 140 12/23/2024    HDL 57 12/23/2024    LDL 74.00 12/23/2024    TRIG 44  "12/23/2024     Lab Results   Component Value Date    SGUA 1.020 12/23/2024    PROTEINUA Negative 12/23/2024    BILIRUBINUA Negative 12/23/2024    WBCUA 0-5 12/23/2024    RBCUA 0-5 12/23/2024    BACTERIA Trace (A) 12/23/2024    LEUKOCYTESUR 25 (A) 12/23/2024    UROBILINOGEN Normal 12/23/2024     Lab Results   Component Value Date    CREATRANDUR 152.9 (H) 12/23/2024    MICALBCREAT 10.7 12/23/2024     Lab Results   Component Value Date    NCHNYSCB16OU 47.7 11/11/2022    FOLATE 15.4 05/01/2023     Lab Results   Component Value Date    HIV Nonreactive 05/01/2023    HEPCAB Nonreactive 05/01/2023     No results found for: "FITDIAG", "COLOGUARD"  No results found for: "OCCBLDIA"    Assessment and Plan (including Health Maintenance)     Problem List Items Addressed This Visit          Cardiac/Vascular    Essential hypertension (Chronic)    Current Assessment & Plan     BP Readings from Last 3 Encounters:   12/23/24 126/80   12/16/24 (!) 141/83   08/21/24 119/61      At goal.  Follow a low sodium (less than 2 grams of sodium per day), DASH diet.   Continue lisinopril and amlodipine as prescribed.  Monitor blood pressure and report any consistent values greater than 140/90 and keep a log.  Maintain healthy weight with a BMI goal of <30.   Aerobic exercise for 150 minutes per week (or 5 days a week for 30 minutes each day).             ID    Immunization due    Current Assessment & Plan     Influenza vaccine was administered.  Ok to take acetaminophen for soreness of arm.          Relevant Medications    pneumoc 20-gayathri conj-dip cr(PF) (PREVNAR-20 (PF)) injection Syrg 0.5 mL (Completed) (Start on 12/23/2024  9:36 AM)       Endocrine    Type 2 diabetes mellitus with hyperglycemia, without long-term current use of insulin (Chronic)    Current Assessment & Plan     Lab Results   Component Value Date    HGBA1C 7.6 (H) 12/23/2024    HGBA1C 6.4 08/19/2024    HGBA1C 8.2 (H) 05/22/2024    HGBA1C 7.9 (H) 02/16/2024   Not at " goal.  Continue metformin and actos as prescribed.  Increase glimepiride to 8 mg daily.  Follow ADA diet.   Check blood glucose twice daily and as needed. Bring log to every office visit.  Avoid soda, simple sweets, and limit rice/pasta/bread/starches and consume brown options when possible.    Maintain healthy weight with BMI goal <30.    Perform aerobic exercise for 150 minutes per week (or 5 days a week for 30 minutes each day).    Examine feet daily.    Obtain annual dilated eye exam.   Kidney Protection: Lisinopril  Statin Therapy: Atorvastatin  Eye exam: 10/31/2024  Foot exam: 5/22/2024         Relevant Medications    glimepiride (AMARYL) 4 MG tablet    Class 3 severe obesity due to excess calories with serious comorbidity and body mass index (BMI) of 40.0 to 44.9 in adult    Current Assessment & Plan     Body mass index is 40.37 kg/m².  Goal BMI <30.  Aerobic exercise 150 minutes per week.  Avoid soda, simple sugars, sweets, excessive rice, pasta, potatoes or bread.   Choose brown options when available and portion control.  Limit fast foods and fried foods.   Choose complex carbs in moderation (ex: green, leafy vegetables, beans, oatmeal).  Eat plenty of fresh fruits and vegetables with lean meats daily.   Consider permanent healthy lifestyle changes.             Other    Encounter to establish care - Primary    Current Assessment & Plan     Wellness labs ordered - to be discussed at next office visit.  Encouraged patient to utilize patient portal for messaging.  Records from previous PCP in EMR.               Health Maintenance Due   Topic Date Due    Mammogram  01/31/2025     All of your core healthy metrics are met.      Health Maintenance Topics with due status: Not Due       Topic Last Completion Date    TETANUS VACCINE 08/17/2020    Colorectal Cancer Screening 03/18/2022    Cervical Cancer Screening 01/10/2024    Foot Exam 05/22/2024    Eye Exam 10/31/2024    Diabetes Urine Screening 12/23/2024     Lipid Panel 12/23/2024    Hemoglobin A1c 12/23/2024    RSV Vaccine (Age 60+ and Pregnant patients) Not Due       Future Appointments   Date Time Provider Department Center   1/15/2025  8:30 AM Barbara Wilson, BOOGIE [facility] GYN [facility]   5/5/2025 10:30 AM Nannette Burt, JAME [facility] NEURO [facility]   10/31/2025  9:30 AM PROVID[facility]        Follow up in about 3 months (around 3/23/2025) for F2F, Follow up, Med check, Lab review, DM, RTC PRN.          Signature:        BOOGIE Samayoa  [facility]  [address] [facility] [id number]    Date of encounter: 12/23/24

## 2024-12-23 NOTE — ASSESSMENT & PLAN NOTE
Lab Results   Component Value Date    BUN 16.0 12/23/2024    CREATININE 1.20 (H) 12/23/2024    EGFRNORACEVR 53 12/23/2024     Keep renal clinic appts as scheduled.   Renoprotective measures discussed:   -Follow renal diet (reduce intake of nuts, peanut butter, milk, cheese, dried beans, peas) and Low Sodium Diet (less than 2 grams per day).   -Control high blood pressure ( goal BP < 130/80, please record BP at home every day and bring log to next office visit)   -Exercise at least 30 minutes a day, 5 days a week.   -Maintain healthy weight.   -Decrease or stop alcohol use.   -Do not smoke.   -Stay well hydrated.   -Receive Pneumovax, Flu, and HBV vaccines if indicated.   -Do not take NSAIDs (Ibuprofen, Naproxen, Aleve, Advil, Toradol, Mobic), may take only Tylenol as needed for pain/headaches.   -Take cholesterol-lowering medications as prescribed (LDL goal <100).

## 2024-12-23 NOTE — ASSESSMENT & PLAN NOTE
Body mass index is 40.37 kg/m².  Goal BMI <30.  Aerobic exercise 150 minutes per week.  Avoid soda, simple sugars, sweets, excessive rice, pasta, potatoes or bread.   Choose brown options when available and portion control.  Limit fast foods and fried foods.   Choose complex carbs in moderation (ex: green, leafy vegetables, beans, oatmeal).  Eat plenty of fresh fruits and vegetables with lean meats daily.   Consider permanent healthy lifestyle changes.

## 2024-12-23 NOTE — ASSESSMENT & PLAN NOTE
BP Readings from Last 3 Encounters:   12/23/24 126/80   12/16/24 (!) 141/83   08/21/24 119/61      At goal.  Follow a low sodium (less than 2 grams of sodium per day), DASH diet.   Continue lisinopril and amlodipine as prescribed.  Monitor blood pressure and report any consistent values greater than 140/90 and keep a log.  Maintain healthy weight with a BMI goal of <30.   Aerobic exercise for 150 minutes per week (or 5 days a week for 30 minutes each day).

## 2024-12-23 NOTE — ASSESSMENT & PLAN NOTE
Lab Results   Component Value Date    HGBA1C 7.6 (H) 12/23/2024    HGBA1C 6.4 08/19/2024    HGBA1C 8.2 (H) 05/22/2024    HGBA1C 7.9 (H) 02/16/2024   Not at goal.  Continue metformin and actos as prescribed.  Increase glimepiride to 8 mg daily.  Follow ADA diet.   Check blood glucose twice daily and as needed. Bring log to every office visit.  Avoid soda, simple sweets, and limit rice/pasta/bread/starches and consume brown options when possible.    Maintain healthy weight with BMI goal <30.    Perform aerobic exercise for 150 minutes per week (or 5 days a week for 30 minutes each day).    Examine feet daily.    Obtain annual dilated eye exam.   Kidney Protection: Lisinopril  Statin Therapy: Atorvastatin  Eye exam: 10/31/2024  Foot exam: 5/22/2024

## 2024-12-23 NOTE — PATIENT INSTRUCTIONS
REMINDER: Please complete labs within 1 week of appointment.   Please complete satisfaction survey when received. Thank you.    Mukesh Fajardo,     If you are due for any health screening(s) below please notify me so we can arrange them to be ordered and scheduled. Most healthy patients at your age complete them, but you are free to accept or refuse.     If you can't do it, I'll definitely understand. If you can, I'd certainly appreciate it!    All of your core healthy metrics are met.

## 2024-12-23 NOTE — ASSESSMENT & PLAN NOTE
Lab Results   Component Value Date    LDL 74.00 12/23/2024     At goal.  Lab Results   Component Value Date    TRIG 44 12/23/2024       Lab Results   Component Value Date    HDL 57 12/23/2024        Lab Results   Component Value Date    CHOL 140 12/23/2024      Continue atorvastatin as prescribed.   Follow a low cholesterol, low saturated fat diet with less than 200 mg of cholesterol a day.   Avoid fried foods and high saturated fats.  Add flax seed or fish oil supplements to diet.   Increase dietary fiber.   Regular exercise improves cholesterol levels.  Physical activity 5 times a week for 30 minutes per day (or 150 minutes per week).   Stressed importance of dietary modifications.

## 2025-01-15 ENCOUNTER — OFFICE VISIT (OUTPATIENT)
Dept: GYNECOLOGY | Facility: CLINIC | Age: 55
End: 2025-01-15
Payer: OTHER GOVERNMENT

## 2025-01-15 VITALS
WEIGHT: 224.19 LBS | BODY MASS INDEX: 41.26 KG/M2 | HEIGHT: 62 IN | TEMPERATURE: 98 F | DIASTOLIC BLOOD PRESSURE: 84 MMHG | SYSTOLIC BLOOD PRESSURE: 137 MMHG | HEART RATE: 72 BPM | RESPIRATION RATE: 18 BRPM | OXYGEN SATURATION: 100 %

## 2025-01-15 DIAGNOSIS — Z01.419 WOMEN'S ANNUAL ROUTINE GYNECOLOGICAL EXAMINATION: Primary | ICD-10-CM

## 2025-01-15 DIAGNOSIS — Z12.31 SCREENING MAMMOGRAM FOR BREAST CANCER: ICD-10-CM

## 2025-01-15 PROCEDURE — 99214 OFFICE O/P EST MOD 30 MIN: CPT | Mod: PBBFAC | Performed by: NURSE PRACTITIONER

## 2025-01-15 PROCEDURE — 99396 PREV VISIT EST AGE 40-64: CPT | Mod: S$PBB,,, | Performed by: NURSE PRACTITIONER

## 2025-01-15 NOTE — PROGRESS NOTES
"Patient ID: Scotty Moctezuma is a 54 y.o. female.    Chief Complaint: Gynecologic Exam      Review of patient's allergies indicates:  No Known Allergies            HPI:  The patient is  here for annual gyn exam. Denies hx of abnormal pap. Last pap 2024-NIL; HPV negative. Pt is postmenopausal. Denies vaginal bleeding/discharge. Denies abd/pelvic pain .Denies breast complaints. Denies dysuria.  Pt is  and not sexually active d/t spouse's health. Pt has known prolapse. Pt states is not bothersome and she is not interested in treatment at this time.    Review of Systems:   Negative except for findings in HPI     Objective:   /84   Pulse 72   Temp 97.9 °F (36.6 °C) (Oral)   Resp 18   Ht 5' 2" (1.575 m)   Wt 101.7 kg (224 lb 3.2 oz)   LMP 10/30/2020 (Exact Date)   SpO2 100%   BMI 41.01 kg/m²    Physical Exam:  GENERAL: Pt is aware and alert and  in no acute distress.  BREASTS: Bilateral-No masses, nipple discharge, skin changes, or tenderness.  ABDOMEN: Soft, non tender. Vertical scar lower abdomen  VULVA:  scattered approx 2mm epidermoid cysts noted bilaterally;no tenderness; no surrounding erythema  URETHRA: No lesions  BLADDER: No tenderness.  VAGINA: Mucosa normal,no discharge; no lesions.  CERVIX:  no CMT, NO discharge; NO lesions;elongated anterior cervix  BIMANUAL EXAM:. Anterior portion of cervix visible at introitus; The uterus is mobile, nontender, no palpable masses. Scott adnexa reveal no evidence of masses; no fullness   SKIN: Warm and Dry  PSYCHIATRIC: Patient is awake and alert. Mood and affect are normal.  Assessment:   Women's annual routine gynecological examination    Screening mammogram for breast cancer  -     Mammo Digital Screening Bilat w/ Rahul; Future; Expected date: 02/15/2025            1. Women's annual routine gynecological examination    2. Screening mammogram for breast cancer             -pap UTD  -Contact clinic with any vaginal bleeding as this would be " abnormal in postmenopausal pt.   Plan:       Follow up in about 1 year (around 1/15/2026).

## 2025-02-12 DIAGNOSIS — E03.9 HYPOTHYROIDISM (ACQUIRED): Primary | ICD-10-CM

## 2025-02-19 RX ORDER — LEVOTHYROXINE SODIUM 100 UG/1
100 TABLET ORAL DAILY
Qty: 90 TABLET | Refills: 1 | Status: SHIPPED | OUTPATIENT
Start: 2025-02-19 | End: 2026-02-19

## 2025-03-03 DIAGNOSIS — E78.2 MIXED HYPERLIPIDEMIA: Chronic | ICD-10-CM

## 2025-03-03 DIAGNOSIS — G44.059 SUNCT (SHORT UNILATERAL NEURALGIFORM HEADACHE, CONJUNCTIVAL INJ/TEAR): Chronic | ICD-10-CM

## 2025-03-03 DIAGNOSIS — E11.65 TYPE 2 DIABETES MELLITUS WITH HYPERGLYCEMIA, WITHOUT LONG-TERM CURRENT USE OF INSULIN: Chronic | ICD-10-CM

## 2025-03-03 DIAGNOSIS — I10 ESSENTIAL HYPERTENSION: Chronic | ICD-10-CM

## 2025-03-05 ENCOUNTER — HOSPITAL ENCOUNTER (OUTPATIENT)
Dept: RADIOLOGY | Facility: HOSPITAL | Age: 55
Discharge: HOME | End: 2025-03-05
Attending: NURSE PRACTITIONER
Payer: OTHER GOVERNMENT

## 2025-03-05 DIAGNOSIS — Z12.31 SCREENING MAMMOGRAM FOR BREAST CANCER: ICD-10-CM

## 2025-03-05 PROCEDURE — 77067 SCR MAMMO BI INCL CAD: CPT | Mod: TC

## 2025-03-05 PROCEDURE — 77063 BREAST TOMOSYNTHESIS BI: CPT | Mod: 26,,, | Performed by: RADIOLOGY

## 2025-03-05 PROCEDURE — 77067 SCR MAMMO BI INCL CAD: CPT | Mod: 26,,, | Performed by: RADIOLOGY

## 2025-03-05 RX ORDER — LAMOTRIGINE 100 MG/1
100 TABLET, EXTENDED RELEASE ORAL DAILY
Qty: 90 TABLET | Refills: 0 | Status: SHIPPED | OUTPATIENT
Start: 2025-03-05

## 2025-03-06 RX ORDER — METFORMIN HYDROCHLORIDE 1000 MG/1
1000 TABLET ORAL 2 TIMES DAILY
Qty: 180 TABLET | Refills: 1 | Status: SHIPPED | OUTPATIENT
Start: 2025-03-06

## 2025-03-06 RX ORDER — ATORVASTATIN CALCIUM 40 MG/1
40 TABLET, FILM COATED ORAL DAILY
Qty: 90 TABLET | Refills: 1 | Status: SHIPPED | OUTPATIENT
Start: 2025-03-06

## 2025-03-06 RX ORDER — AMLODIPINE BESYLATE 10 MG/1
10 TABLET ORAL DAILY
Qty: 90 TABLET | Refills: 1 | Status: SHIPPED | OUTPATIENT
Start: 2025-03-06

## 2025-03-06 RX ORDER — LISINOPRIL 20 MG/1
20 TABLET ORAL DAILY
Qty: 90 TABLET | Refills: 1 | Status: SHIPPED | OUTPATIENT
Start: 2025-03-06

## 2025-03-06 RX ORDER — PIOGLITAZONEHYDROCHLORIDE 30 MG/1
30 TABLET ORAL DAILY
Qty: 90 TABLET | Refills: 1 | Status: SHIPPED | OUTPATIENT
Start: 2025-03-06

## 2025-04-09 ENCOUNTER — LAB VISIT (OUTPATIENT)
Dept: LAB | Facility: HOSPITAL | Age: 55
End: 2025-04-09
Payer: OTHER GOVERNMENT

## 2025-04-09 ENCOUNTER — OFFICE VISIT (OUTPATIENT)
Dept: INTERNAL MEDICINE | Facility: CLINIC | Age: 55
End: 2025-04-09
Payer: OTHER GOVERNMENT

## 2025-04-09 VITALS
RESPIRATION RATE: 18 BRPM | WEIGHT: 222.13 LBS | HEART RATE: 63 BPM | TEMPERATURE: 98 F | HEIGHT: 62 IN | SYSTOLIC BLOOD PRESSURE: 103 MMHG | DIASTOLIC BLOOD PRESSURE: 67 MMHG | BODY MASS INDEX: 40.88 KG/M2 | OXYGEN SATURATION: 99 %

## 2025-04-09 DIAGNOSIS — R10.11 RUQ PAIN: ICD-10-CM

## 2025-04-09 DIAGNOSIS — E66.813 CLASS 3 SEVERE OBESITY DUE TO EXCESS CALORIES WITH SERIOUS COMORBIDITY AND BODY MASS INDEX (BMI) OF 40.0 TO 44.9 IN ADULT: ICD-10-CM

## 2025-04-09 DIAGNOSIS — I10 ESSENTIAL HYPERTENSION: Chronic | ICD-10-CM

## 2025-04-09 DIAGNOSIS — E11.65 TYPE 2 DIABETES MELLITUS WITH HYPERGLYCEMIA, WITHOUT LONG-TERM CURRENT USE OF INSULIN: ICD-10-CM

## 2025-04-09 DIAGNOSIS — E78.2 MIXED HYPERLIPIDEMIA: Chronic | ICD-10-CM

## 2025-04-09 DIAGNOSIS — E11.65 TYPE 2 DIABETES MELLITUS WITH HYPERGLYCEMIA, WITHOUT LONG-TERM CURRENT USE OF INSULIN: Primary | Chronic | ICD-10-CM

## 2025-04-09 DIAGNOSIS — E03.9 HYPOTHYROIDISM (ACQUIRED): Chronic | ICD-10-CM

## 2025-04-09 DIAGNOSIS — E66.01 CLASS 3 SEVERE OBESITY DUE TO EXCESS CALORIES WITH SERIOUS COMORBIDITY AND BODY MASS INDEX (BMI) OF 40.0 TO 44.9 IN ADULT: ICD-10-CM

## 2025-04-09 DIAGNOSIS — G44.059 SUNCT (SHORT UNILATERAL NEURALGIFORM HEADACHE, CONJUNCTIVAL INJ/TEAR): Chronic | ICD-10-CM

## 2025-04-09 LAB
ALBUMIN SERPL-MCNC: 3.7 G/DL (ref 3.5–5)
ALBUMIN/GLOB SERPL: 1.1 RATIO (ref 1.1–2)
ALP SERPL-CCNC: 70 UNIT/L (ref 40–150)
ALT SERPL-CCNC: 35 UNIT/L (ref 0–55)
ANION GAP SERPL CALC-SCNC: 5 MEQ/L
AST SERPL-CCNC: 29 UNIT/L (ref 11–45)
BACTERIA #/AREA URNS AUTO: ABNORMAL /HPF
BILIRUB SERPL-MCNC: 0.4 MG/DL
BILIRUB UR QL STRIP.AUTO: NEGATIVE
BUN SERPL-MCNC: 16.4 MG/DL (ref 9.8–20.1)
CALCIUM SERPL-MCNC: 9.6 MG/DL (ref 8.4–10.2)
CHLORIDE SERPL-SCNC: 105 MMOL/L (ref 98–107)
CLARITY UR: CLEAR
CO2 SERPL-SCNC: 28 MMOL/L (ref 22–29)
COLOR UR AUTO: ABNORMAL
CREAT SERPL-MCNC: 1.18 MG/DL (ref 0.55–1.02)
CREAT UR-MCNC: 60 MG/DL (ref 45–106)
CREAT/UREA NIT SERPL: 14
EST. AVERAGE GLUCOSE BLD GHB EST-MCNC: 174.3 MG/DL
GFR SERPLBLD CREATININE-BSD FMLA CKD-EPI: 54 ML/MIN/1.73/M2
GLOBULIN SER-MCNC: 3.5 GM/DL (ref 2.4–3.5)
GLUCOSE SERPL-MCNC: 186 MG/DL (ref 74–100)
GLUCOSE UR QL STRIP: NORMAL
HBA1C MFR BLD: 7.7 %
HGB UR QL STRIP: NEGATIVE
HYALINE CASTS #/AREA URNS LPF: ABNORMAL /LPF
KETONES UR QL STRIP: NEGATIVE
LEUKOCYTE ESTERASE UR QL STRIP: NEGATIVE
MICROALBUMIN UR-MCNC: 9.6 UG/ML
MICROALBUMIN/CREAT RATIO PNL UR: 16 MG/GM CR (ref 0–30)
NITRITE UR QL STRIP: NEGATIVE
PH UR STRIP: 6 [PH]
POTASSIUM SERPL-SCNC: 4.4 MMOL/L (ref 3.5–5.1)
PROT SERPL-MCNC: 7.2 GM/DL (ref 6.4–8.3)
PROT UR QL STRIP: NEGATIVE
RBC #/AREA URNS AUTO: ABNORMAL /HPF
SODIUM SERPL-SCNC: 138 MMOL/L (ref 136–145)
SP GR UR STRIP.AUTO: 1.01 (ref 1–1.03)
SQUAMOUS #/AREA URNS LPF: ABNORMAL /HPF
T4 FREE SERPL-MCNC: 1.12 NG/DL (ref 0.7–1.48)
TSH SERPL-ACNC: 1.72 UIU/ML (ref 0.35–4.94)
UROBILINOGEN UR STRIP-ACNC: NORMAL
WBC #/AREA URNS AUTO: ABNORMAL /HPF

## 2025-04-09 PROCEDURE — 82043 UR ALBUMIN QUANTITATIVE: CPT

## 2025-04-09 PROCEDURE — 99214 OFFICE O/P EST MOD 30 MIN: CPT | Mod: S$PBB,,,

## 2025-04-09 PROCEDURE — 81015 MICROSCOPIC EXAM OF URINE: CPT

## 2025-04-09 PROCEDURE — 84443 ASSAY THYROID STIM HORMONE: CPT

## 2025-04-09 PROCEDURE — 84439 ASSAY OF FREE THYROXINE: CPT

## 2025-04-09 PROCEDURE — 99215 OFFICE O/P EST HI 40 MIN: CPT | Mod: PBBFAC

## 2025-04-09 PROCEDURE — 36415 COLL VENOUS BLD VENIPUNCTURE: CPT

## 2025-04-09 PROCEDURE — 83036 HEMOGLOBIN GLYCOSYLATED A1C: CPT

## 2025-04-09 PROCEDURE — 80053 COMPREHEN METABOLIC PANEL: CPT

## 2025-04-09 RX ORDER — PIOGLITAZONEHYDROCHLORIDE 45 MG/1
45 TABLET ORAL DAILY
Qty: 90 TABLET | Refills: 2 | Status: SHIPPED | OUTPATIENT
Start: 2025-04-09 | End: 2026-04-09

## 2025-04-09 NOTE — ASSESSMENT & PLAN NOTE
BP Readings from Last 3 Encounters:   04/09/25 103/67   01/15/25 137/84   12/23/24 126/80      At goal.  Follow a low sodium (less than 2 grams of sodium per day), DASH diet.   Continue lisinopril and amlodipine as prescribed.  Monitor blood pressure and report any consistent values greater than 140/90 and keep a log.  Maintain healthy weight with a BMI goal of <30.   Aerobic exercise for 150 minutes per week (or 5 days a week for 30 minutes each day).

## 2025-04-09 NOTE — ASSESSMENT & PLAN NOTE
Body mass index is 40.62 kg/m².  Goal BMI <30.  Aerobic exercise 150 minutes per week.  Avoid soda, simple sugars, sweets, excessive rice, pasta, potatoes or bread.   Choose brown options when available and portion control.  Limit fast foods and fried foods.   Choose complex carbs in moderation (ex: green, leafy vegetables, beans, oatmeal).  Eat plenty of fresh fruits and vegetables with lean meats daily.   Consider permanent healthy lifestyle changes.

## 2025-04-09 NOTE — ASSESSMENT & PLAN NOTE
Reviewed past CT scan results and checked for previous ultrasound exams.  Ordered an ultrasound to further evaluate the abdominal pain.  Advised the patient to maintain a food diary to identify potential triggers.  Instructed the patient to report any worsening of symptoms or new developments.  ED precautions.

## 2025-04-09 NOTE — PROGRESS NOTES
PATIENT NAME: Scotty Moctezuma  : 1970  DATE: 25  MRN: [id number]          Reason for Visit/Chief Complaint   Diabetes and Abdominal Pain (RUQ)       History of Present Illness (HPI)     Scotty Moctezuma is a 54 y.o. Black female presenting in clinic today for Diabetes and Abdominal Pain (RUQ). PMH: HTN, HLD, DM2, Hypothyroidism, SUNCT Headaches, Obesity.  Previous PCP: BOOGIE Ivan - last office visit on 2024 (with Princess Perez NP).      She is followed by:  [facility] GYN clinic - last office visit on 1/10/2024.  [facility] neurology clinic for SUNCT, stroke like symptoms, memory loss. Last office visit n 2024.Plan: continue Lisinopril 20 mg daily, Lamotrigine  mg daily.       Today, patient presents today with headaches. She reports experiencing severe headaches, with a recent episode causing her to spend an entire day in bed and unable to sleep due to the intensity. She is currently under the care of a neurologist for management.     She reports persistent RUQ abdominal pain that initially began with Victoza use and has continued despite medication discontinuation. The pain is associated with nausea and inability to tolerate oral intake. She had multiple ER visits for similar pain while taking Victoza. CT WO Contrast from 2024 showed no abnormalities. She has regular bowel movements, noting that certain foods like donuts or sausage can trigger immediate bowel movements after consumption. She typically eats oatmeal for breakfast, though today had a biscuit with sausage gravy in the cafeteria. She has been taking metformin for 20 years and reports experiencing gas as a side effect.     Denies smoking, drinking, or illicit drug use. Denies chest pain, shortness of breath, cough, headache, dizziness, weakness, abdominal pain, nausea, vomiting, diarrhea, constipation, dysuria, depression, anxiety, SI, and HI.     Review of Systems     Review of Systems   Constitutional:  Negative for  activity change and unexpected weight change.   HENT:  Negative for hearing loss, rhinorrhea and trouble swallowing.    Eyes:  Negative for discharge and visual disturbance.   Respiratory:  Negative for chest tightness and wheezing.    Cardiovascular:  Negative for chest pain and palpitations.   Gastrointestinal:  Positive for abdominal pain (RUQ). Negative for blood in stool, constipation, diarrhea and vomiting.   Endocrine: Negative for polydipsia and polyuria.   Genitourinary:  Negative for difficulty urinating, dysuria, hematuria and menstrual problem.   Musculoskeletal:  Negative for arthralgias, joint swelling and neck pain.   Neurological:  Positive for headaches. Negative for weakness.   Psychiatric/Behavioral:  Negative for confusion and dysphoric mood.        Medical / Social / Family History     Past Medical History:   Diagnosis Date    Anxiety     Diabetes mellitus, type 2     Essential hypertension 2022    Headache     High cholesterol     Non-seasonal allergic rhinitis     Stage 3a chronic kidney disease     Thyroid disease          Past Surgical History:   Procedure Laterality Date     SECTION      CHOLECYSTECTOMY      TUBAL LIGATION           Social History  Scotty Valdez  reports that she has never smoked. She has never been exposed to tobacco smoke. She has never used smokeless tobacco. She reports that she does not currently use alcohol. She reports that she does not use drugs.    Family History  Scotty Valdez family history includes Pompeii's disease in her father; Glaucoma in her maternal aunt and maternal grandmother; Heart failure in her mother.    Medications and Allergies     Medications  Current Outpatient Medications   Medication Instructions    albuterol (PROVENTIL/VENTOLIN HFA) 90 mcg/actuation inhaler 2 puffs, Inhalation, Every 6 hours PRN    amLODIPine (NORVASC) 10 mg, Oral, Daily    atorvastatin (LIPITOR) 40 mg, Oral, Daily    EUTHYROX 100 mcg, Oral,  "Daily    glimepiride (AMARYL) 8 mg, Oral, With breakfast, For Diabetes    lamotrigine XR (LAMICTAL XR) 100 mg, Oral, Daily    lisinopriL (PRINIVIL,ZESTRIL) 20 mg, Oral, Daily    metFORMIN (GLUCOPHAGE) 1,000 mg, Oral, 2 times daily    pioglitazone (ACTOS) 45 mg, Oral, Daily       Allergies  Review of patient's allergies indicates:  No Known Allergies    Physical Examination   Visit Vitals  /67 (BP Location: Left arm, Patient Position: Sitting)   Pulse 63   Temp 97.7 °F (36.5 °C) (Oral)   Resp 18   Ht 5' 2" (1.575 m)   Wt 100.7 kg (222 lb 1.6 oz)   LMP 10/30/2020 (Exact Date)   SpO2 99%   BMI 40.62 kg/m²     Physical Exam  Vitals reviewed.   Constitutional:       Appearance: Normal appearance. She is obese.   HENT:      Head: Normocephalic and atraumatic.      Right Ear: External ear normal.      Left Ear: External ear normal.      Nose: Nose normal.      Mouth/Throat:      Mouth: Mucous membranes are moist.      Pharynx: Oropharynx is clear.   Eyes:      Extraocular Movements: Extraocular movements intact.      Pupils: Pupils are equal, round, and reactive to light.   Cardiovascular:      Rate and Rhythm: Normal rate and regular rhythm.      Pulses: Normal pulses.      Heart sounds: Normal heart sounds.   Pulmonary:      Effort: Pulmonary effort is normal.      Breath sounds: Normal breath sounds.   Abdominal:      General: Bowel sounds are normal.      Palpations: Abdomen is soft.      Tenderness: There is no abdominal tenderness.   Musculoskeletal:         General: Normal range of motion.      Cervical back: Normal range of motion and neck supple.   Skin:     General: Skin is warm and dry.      Capillary Refill: Capillary refill takes less than 2 seconds.   Neurological:      General: No focal deficit present.      Mental Status: She is alert and oriented to person, place, and time.   Psychiatric:         Mood and Affect: Mood normal.         Behavior: Behavior normal.         Thought Content: Thought content " normal.         Judgment: Judgment normal.       Results     Lab Results   Component Value Date    WBC 3.79 (L) 12/23/2024    RBC 4.17 (L) 12/23/2024    HGB 12.8 12/23/2024    HCT 39.1 12/23/2024    MCV 93.8 12/23/2024    MCH 30.7 12/23/2024    MCHC 32.7 (L) 12/23/2024    RDW 13.2 12/23/2024     12/23/2024    MPV 10.2 12/23/2024      Lab Results   Component Value Date     04/09/2025    K 4.4 04/09/2025    CO2 28 04/09/2025    GLUCOSE 186 (H) 04/09/2025    BUN 16.4 04/09/2025    CREATININE 1.18 (H) 04/09/2025    LABPROT 7.2 04/09/2025    ALBUMIN 3.7 04/09/2025    BILITOT 0.4 04/09/2025    ALKPHOS 70 04/09/2025    AST 29 04/09/2025    ALT 35 04/09/2025    AGAP 5.0 04/09/2025    EGFRNORACEVR 54 04/09/2025     Lab Results   Component Value Date    TSH 1.722 04/09/2025     Lab Results   Component Value Date    CHOL 140 12/23/2024    HDL 57 12/23/2024    LDL 74.00 12/23/2024    TRIG 44 12/23/2024     Lab Results   Component Value Date    SGUA 1.010 04/09/2025    PROTEINUA Negative 04/09/2025    BILIRUBINUA Negative 04/09/2025    WBCUA 0-5 04/09/2025    RBCUA None Seen 04/09/2025    BACTERIA None Seen 04/09/2025    LEUKOCYTESUR Negative 04/09/2025    UROBILINOGEN Normal 04/09/2025     Lab Results   Component Value Date    CREATRANDUR 60.0 04/09/2025    MICALBCREAT 16.0 04/09/2025     Lab Results   Component Value Date    VKBHITRU34QX 47.7 11/11/2022    FOLATE 15.4 05/01/2023     Lab Results   Component Value Date    HIV Nonreactive 05/01/2023    HEPCAB Nonreactive 05/01/2023     Assessment and Plan (including Health Maintenance)     Problem List Items Addressed This Visit          Cardiac/Vascular    Essential hypertension (Chronic)    Current Assessment & Plan   BP Readings from Last 3 Encounters:   04/09/25 103/67   01/15/25 137/84   12/23/24 126/80      At goal.  Follow a low sodium (less than 2 grams of sodium per day), DASH diet.   Continue lisinopril and amlodipine as prescribed.  Monitor blood pressure  and report any consistent values greater than 140/90 and keep a log.  Maintain healthy weight with a BMI goal of <30.   Aerobic exercise for 150 minutes per week (or 5 days a week for 30 minutes each day).          Relevant Orders    Urinalysis, Reflex to Urine Culture    Microalbumin/Creatinine Ratio, Urine    Comprehensive Metabolic Panel    CBC Auto Differential    Mixed hyperlipidemia (Chronic)    Current Assessment & Plan   Lipid panel ordered for next office visit.  Continue atorvastatin as prescribed.   Follow a low cholesterol, low saturated fat diet with less than 200 mg of cholesterol a day.   Avoid fried foods and high saturated fats.  Add flax seed or fish oil supplements to diet.   Increase dietary fiber.   Regular exercise improves cholesterol levels.  Physical activity 5 times a week for 30 minutes per day (or 150 minutes per week).   Stressed importance of dietary modifications.          Relevant Orders    Lipid Panel       Endocrine    Type 2 diabetes mellitus with hyperglycemia, without long-term current use of insulin - Primary (Chronic)    Current Assessment & Plan   Lab Results   Component Value Date    HGBA1C 7.7 (H) 04/09/2025    HGBA1C 7.6 (H) 12/23/2024    HGBA1C 6.4 08/19/2024    HGBA1C 8.2 (H) 05/22/2024   Not at goal.  Continue metformin and glimepriride as prescribed.  Increase glimepiride to 8 mg daily.  Follow ADA diet.   Check blood glucose twice daily and as needed. Bring log to every office visit.  Avoid soda, simple sweets, and limit rice/pasta/bread/starches and consume brown options when possible.    Maintain healthy weight with BMI goal <30.    Perform aerobic exercise for 150 minutes per week (or 5 days a week for 30 minutes each day).    Examine feet daily.    Obtain annual dilated eye exam.   Kidney Protection: Lisinopril  Statin Therapy: Atorvastatin  Eye exam: 10/31/2024  Foot exam: 5/22/2024         Relevant Medications    pioglitazone (ACTOS) 45 MG tablet    Other  Relevant Orders    Hemoglobin A1C    Hypothyroidism (acquired) (Chronic)    Current Assessment & Plan   Lab Results   Component Value Date    TSH 1.722 04/09/2025   Euthyroid.  Take medicine on an empty stomach, first thing in the morning, with a sip of water (NO OTHER MEDICATIONS OR BEVERAGES at that time).   Wait 30 minutes before eating or drinking anything.           Relevant Orders    TSH (Completed)    T4, Free (Completed)    TSH    T4, Free    Class 3 severe obesity due to excess calories with serious comorbidity and body mass index (BMI) of 40.0 to 44.9 in adult    Current Assessment & Plan   Body mass index is 40.62 kg/m².  Goal BMI <30.  Aerobic exercise 150 minutes per week.  Avoid soda, simple sugars, sweets, excessive rice, pasta, potatoes or bread.   Choose brown options when available and portion control.  Limit fast foods and fried foods.   Choose complex carbs in moderation (ex: green, leafy vegetables, beans, oatmeal).  Eat plenty of fresh fruits and vegetables with lean meats daily.   Consider permanent healthy lifestyle changes.          Relevant Orders    Vitamin D       GI    RUQ pain    Current Assessment & Plan   Reviewed past CT scan results and checked for previous ultrasound exams.  Ordered an ultrasound to further evaluate the abdominal pain.  Advised the patient to maintain a food diary to identify potential triggers.  Instructed the patient to report any worsening of symptoms or new developments.  ED precautions.         Relevant Orders    US Abdomen Limited_Liver    Comprehensive Metabolic Panel            Health Maintenance Due   Topic Date Due    Foot Exam  05/22/2025     All of your core healthy metrics are met.      Health Maintenance Topics with due status: Not Due       Topic Last Completion Date    TETANUS VACCINE 08/17/2020    Colorectal Cancer Screening 03/18/2022    Cervical Cancer Screening 01/10/2024    Diabetic Eye Exam 10/31/2024    Lipid Panel 12/23/2024    Mammogram  03/05/2025    Diabetes Urine Screening 04/09/2025    Hemoglobin A1c 04/09/2025    RSV Vaccine (Age 60+ and Pregnant patients) Not Due       Future Appointments   Date Time Provider Department Center   5/5/2025 10:30 AM Nannette Burt, [facility] NEURO [place] [facility]   8/12/2025  9:20 AM Arcelia Claros, [facility] INTMED [place] [facility]   10/31/2025  9:40 [facility]   1/20/2026  8:50 AM Barbara Wilson, [facility] GYN [place] [facility]        Follow up in about 4 months (around 8/9/2025) for F2F, Lab review, Wellness, Med check, RTC PRN.          Signature:        BOOGIE Samayoa  [facility]  [address] [id number]    Date of encounter: 4/9/25    This note was generated with the assistance of ambient listening technology. Verbal consent was obtained by the patient and accompanying visitor(s) for the recording of patient appointment to facilitate this note. I attest to having reviewed and edited the generated note for accuracy, though some syntax or spelling errors may persist. Please contact the author of this note for any clarification.

## 2025-04-09 NOTE — ASSESSMENT & PLAN NOTE
Lab Results   Component Value Date    HGBA1C 7.7 (H) 04/09/2025    HGBA1C 7.6 (H) 12/23/2024    HGBA1C 6.4 08/19/2024    HGBA1C 8.2 (H) 05/22/2024   Not at goal.  Continue metformin and glimepriride as prescribed.  Increase glimepiride to 8 mg daily.  Follow ADA diet.   Check blood glucose twice daily and as needed. Bring log to every office visit.  Avoid soda, simple sweets, and limit rice/pasta/bread/starches and consume brown options when possible.    Maintain healthy weight with BMI goal <30.    Perform aerobic exercise for 150 minutes per week (or 5 days a week for 30 minutes each day).    Examine feet daily.    Obtain annual dilated eye exam.   Kidney Protection: Lisinopril  Statin Therapy: Atorvastatin  Eye exam: 10/31/2024  Foot exam: 5/22/2024

## 2025-04-09 NOTE — ASSESSMENT & PLAN NOTE
Lab Results   Component Value Date    TSH 1.722 04/09/2025   Euthyroid.  Take medicine on an empty stomach, first thing in the morning, with a sip of water (NO OTHER MEDICATIONS OR BEVERAGES at that time).   Wait 30 minutes before eating or drinking anything.

## 2025-04-09 NOTE — ASSESSMENT & PLAN NOTE
Lipid panel ordered for next office visit.  Continue atorvastatin as prescribed.   Follow a low cholesterol, low saturated fat diet with less than 200 mg of cholesterol a day.   Avoid fried foods and high saturated fats.  Add flax seed or fish oil supplements to diet.   Increase dietary fiber.   Regular exercise improves cholesterol levels.  Physical activity 5 times a week for 30 minutes per day (or 150 minutes per week).   Stressed importance of dietary modifications.

## 2025-04-11 ENCOUNTER — ON-DEMAND VIRTUAL (OUTPATIENT)
Dept: URGENT CARE | Facility: CLINIC | Age: 55
End: 2025-04-11
Payer: OTHER GOVERNMENT

## 2025-04-11 DIAGNOSIS — G44.009 CLUSTER HEADACHE, NOT INTRACTABLE, UNSPECIFIED CHRONICITY PATTERN: Primary | ICD-10-CM

## 2025-04-11 NOTE — PROGRESS NOTES
Subjective:      Patient ID: Scotyt Moctezuma is a 54 y.o. female.    Vitals:  vitals were not taken for this visit.     Chief Complaint: Headache      Visit Type: TELE AUDIOVISUAL - This visit was conducted virtually based on  subjective information and limited objective exam    Present with the patient at the time of consultation: TELEMED PRESENT WITH PATIENT: None  LOCATION OF PATIENT [place]  Two patient identifiers used to verify patient- saying out date of birth and full name.       Past Medical History:   Diagnosis Date    Anxiety     Diabetes mellitus, type 2     Essential hypertension 2022    Headache     High cholesterol     Non-seasonal allergic rhinitis     Stage 3a chronic kidney disease     Thyroid disease      Past Surgical History:   Procedure Laterality Date     SECTION      CHOLECYSTECTOMY      TUBAL LIGATION       Review of patient's allergies indicates:  No Known Allergies  Medications Ordered Prior to Encounter[1]  Family History   Problem Relation Name Age of Onset    Heart failure Mother      Harpal's disease Father      Glaucoma Maternal Aunt      Glaucoma Maternal Grandmother             Ohs Peq Odvv Intake    2025  9:02 AM CDT - Filed by Patient   What is your current physical address in the event of a medical emergency? [address] [place]   Are you able to take your vital signs? Yes   Systolic Blood Pressure: 115   Diastolic Blood Pressure: 69   Weight: 222   Height: 63   Pulse: 69   Temperature:    Respiration rate:    Pulse Oxygen:    Please attach any relevant images or files    Is your employer contracted with [facility]? No         53 yo female hx of SUNT c/o headache mostly behind eyes, with tearing of left eye for the past couple of days.    Not well controlled with lamictal anymore, use to be on it for the past year and not working anymore.   Hx of migraines, failed therapy for tylenol, sumatriptan, fiorcet in past.   Can't  use NSAID due to kidney issues.   Denies fever, chills, N/V, blurry vision, weakness, fatigue, SOB, CP.              Constitution: Negative for activity change, appetite change, chills and fever.   Eyes:  Negative for photophobia and blurred vision.   Gastrointestinal:  Negative for nausea, vomiting, constipation and diarrhea.   Neurological:  Positive for headaches and history of migraines. Negative for dizziness.        Objective:   The physical exam was conducted virtually.    Pt is alert and oriented.  Speech is clear and coherent.  Speaking in complete sentences.  No distress.  Mood and affect are normal.     Limited physical exam due to virtual visit.         Assessment:     1. Cluster headache, not intractable, unspecified chronicity pattern        Plan:         Thank you for choosing [facility]!    Our goal in the [facility] is to always provide outstanding medical care. You may receive a survey by mail or e-mail in the next week regarding your experience today. We would greatly appreciate you completing and returning the survey. Your feedback provides us with a way to recognize our staff who provide very good care, and it helps us learn how to improve when your experience was below our aspiration of excellence.         We appreciate you trusting us with your medical care. We hope you feel better soon. We will be happy to take care of you for all of your future medical needs.    You must understand that you've received an Urgent Care treatment only and that you may be released before all your medical problems are known or treated. You, the patient, will arrange for follow up care as instructed.    Follow up with your PCP or specialty clinic as directed in the next 1-2 weeks if not improved or as needed.  You can call [phone] to schedule an appointment with the appropriate provider.    If your condition worsens we recommend that you receive another evaluation in  person, with your primary care provider, urgent care or at the emergency room immediately or contact your primary medical clinics after hours call service to discuss your concerns.         Cluster headache, not intractable, unspecified chronicity pattern  Comments:  recommend 100% oxygen at 12-15 L/min via non-rebreather mask for 15-20 minutes  if not effective, recommend sumatriptan injection  Follow up with neurology for med management  ER precautions provided                         [1]   Current Outpatient Medications on File Prior to Visit   Medication Sig Dispense Refill    albuterol (PROVENTIL/VENTOLIN HFA) 90 mcg/actuation inhaler Inhale 2 puffs into the lungs every 6 (six) hours as needed for Wheezing. 18 g 2    amLODIPine (NORVASC) 10 MG tablet Take 1 tablet (10 mg total) by mouth once daily. 90 tablet 1    atorvastatin (LIPITOR) 40 MG tablet Take 1 tablet (40 mg total) by mouth once daily. 90 tablet 1    EUTHYROX 100 mcg tablet Take 1 tablet (100 mcg total) by mouth once daily. 90 tablet 1    glimepiride (AMARYL) 4 MG tablet Take 2 tablets (8 mg total) by mouth daily with breakfast. For Diabetes 180 tablet 3    lamotrigine XR (LAMICTAL XR) 100 mg TR24 XR tablet Take 1 tablet (100 mg total) by mouth once daily. 90 tablet 0    lisinopriL (PRINIVIL,ZESTRIL) 20 MG tablet Take 1 tablet (20 mg total) by mouth once daily. 90 tablet 1    metFORMIN (GLUCOPHAGE) 1000 MG tablet Take 1 tablet (1,000 mg total) by mouth 2 (two) times daily. 180 tablet 1    pioglitazone (ACTOS) 45 MG tablet Take 1 tablet (45 mg total) by mouth once daily. 90 tablet 2     No current facility-administered medications on file prior to visit.

## 2025-04-15 ENCOUNTER — HOSPITAL ENCOUNTER (OUTPATIENT)
Dept: RADIOLOGY | Facility: HOSPITAL | Age: 55
Discharge: HOME | End: 2025-04-15
Payer: OTHER GOVERNMENT

## 2025-04-15 DIAGNOSIS — R10.11 RUQ PAIN: ICD-10-CM

## 2025-04-15 PROCEDURE — 76705 ECHO EXAM OF ABDOMEN: CPT | Mod: TC

## 2025-04-22 ENCOUNTER — RESULTS FOLLOW-UP (OUTPATIENT)
Dept: INTERNAL MEDICINE | Facility: CLINIC | Age: 55
End: 2025-04-22

## 2025-04-22 DIAGNOSIS — K76.9 LIVER DISEASE, UNSPECIFIED: ICD-10-CM

## 2025-04-22 DIAGNOSIS — R10.11 RUQ PAIN: Primary | ICD-10-CM

## 2025-04-22 DIAGNOSIS — R74.8 ELEVATED LIVER ENZYMES: ICD-10-CM

## 2025-04-22 NOTE — PROGRESS NOTES
Please inform Scotty Moctezuma of the following:    I reviewed your liver ultrasound. It shows multiple lesions in the liver, but the ultrasound could not determine exactly what the lesions are. These lesions were not seen on the previous CT scan. Recommendation is to get another CT to get a clearer picture. I will order that.     Thank you,    ABDOULAYE Keller

## 2025-04-30 ENCOUNTER — TELEPHONE (OUTPATIENT)
Dept: NEUROLOGY | Facility: CLINIC | Age: 55
End: 2025-04-30
Payer: OTHER GOVERNMENT

## 2025-05-01 ENCOUNTER — HOSPITAL ENCOUNTER (EMERGENCY)
Facility: HOSPITAL | Age: 55
Discharge: HOME | End: 2025-05-01
Attending: EMERGENCY MEDICINE
Payer: OTHER GOVERNMENT

## 2025-05-01 VITALS
TEMPERATURE: 98 F | WEIGHT: 218 LBS | RESPIRATION RATE: 18 BRPM | HEART RATE: 85 BPM | SYSTOLIC BLOOD PRESSURE: 139 MMHG | BODY MASS INDEX: 38.62 KG/M2 | DIASTOLIC BLOOD PRESSURE: 72 MMHG | OXYGEN SATURATION: 98 % | HEIGHT: 63 IN

## 2025-05-01 DIAGNOSIS — H60.502 ACUTE OTITIS EXTERNA OF LEFT EAR, UNSPECIFIED TYPE: ICD-10-CM

## 2025-05-01 DIAGNOSIS — H65.02 ACUTE SEROUS OTITIS MEDIA OF LEFT EAR, RECURRENCE NOT SPECIFIED: Primary | ICD-10-CM

## 2025-05-01 PROCEDURE — 99284 EMERGENCY DEPT VISIT MOD MDM: CPT

## 2025-05-01 RX ORDER — AMOXICILLIN 875 MG/1
875 TABLET, FILM COATED ORAL 2 TIMES DAILY
Qty: 14 TABLET | Refills: 0 | Status: SHIPPED | OUTPATIENT
Start: 2025-05-01 | End: 2025-05-08

## 2025-05-01 RX ORDER — PROMETHAZINE HYDROCHLORIDE AND DEXTROMETHORPHAN HYDROBROMIDE 6.25; 15 MG/5ML; MG/5ML
5 SYRUP ORAL EVERY 6 HOURS PRN
Qty: 118 ML | Refills: 0 | Status: SHIPPED | OUTPATIENT
Start: 2025-05-01

## 2025-05-01 RX ORDER — OFLOXACIN 3 MG/ML
5 SOLUTION AURICULAR (OTIC) 2 TIMES DAILY
Qty: 5 ML | Refills: 0 | Status: SHIPPED | OUTPATIENT
Start: 2025-05-01 | End: 2025-05-08

## 2025-05-02 NOTE — DISCHARGE INSTRUCTIONS
Take medication as prescribed with food and water until complete.  Follow up with your doctor within 2 or 3 days and return if symptoms worsen.

## 2025-05-02 NOTE — ED PROVIDER NOTES
Encounter Date: 2025       History     Chief Complaint   Patient presents with    Otalgia     Pt c/o left sided ear pain x2 days. Hurts when she talks, coughs, swallows, and leans over. States she heard a popping noise. Denies drainage. Pain 9/10     54-year-old female presents to the emergency department complaints of left-sided ear pain x 2 days.  Patient states she has a mild sore throat and cough that has caused her to lose her voice.  She rates her pain 9/10.    The history is provided by the patient. No  was used.     Review of patient's allergies indicates:  No Known Allergies  Past Medical History:   Diagnosis Date    Anxiety     Diabetes mellitus, type 2     Essential hypertension 2022    Headache     High cholesterol     Non-seasonal allergic rhinitis     Stage 3a chronic kidney disease     Thyroid disease      Past Surgical History:   Procedure Laterality Date     SECTION      CHOLECYSTECTOMY      TUBAL LIGATION       Family History   Problem Relation Name Age of Onset    Heart failure Mother      Frederick's disease Father      Glaucoma Maternal Aunt      Glaucoma Maternal Grandmother       Social History[1]  Review of Systems   Constitutional:  Negative for chills and fever.   HENT:  Positive for ear pain (left ear pain) and sore throat.    Respiratory:  Positive for cough.    Neurological:  Negative for dizziness and light-headedness.       Physical Exam     Initial Vitals [25 1933]   BP Pulse Resp Temp SpO2   139/72 85 18 98 °F (36.7 °C) 98 %      MAP       --         Physical Exam    Nursing note and vitals reviewed.  Constitutional: She appears well-developed and well-nourished.   HENT:   Head: Normocephalic and atraumatic.   Right Ear: Tympanic membrane is erythematous.   Left Ear: There is swelling and tenderness. Tympanic membrane is erythematous.   Nose: Nose normal. Mouth/Throat: Posterior oropharyngeal erythema present. No tonsillar abscesses.   No  mastoid tenderness   Eyes: Conjunctivae are normal.   Neck: Neck supple.   Normal range of motion.  Cardiovascular:  Normal rate, regular rhythm, normal heart sounds and intact distal pulses.           Pulmonary/Chest: Breath sounds normal. No respiratory distress. She has no wheezes. She has no rhonchi. She has no rales. She exhibits no tenderness.   Abdominal: Abdomen is soft. Bowel sounds are normal.   Musculoskeletal:         General: Normal range of motion.      Cervical back: Normal range of motion and neck supple.     Neurological: She is alert.   Skin: Skin is warm. Capillary refill takes less than 2 seconds.         ED Course   Procedures  Labs Reviewed - No data to display       Imaging Results    None          Medications - No data to display  Medical Decision Making  54-year-old female presents to the emergency department complaints of left-sided ear pain x 2 days.  Patient states she has a mild sore throat and cough that has caused her to lose her voice.  She rates her pain 9/10.    DDx:  Otitis media, otitis externa    Prescribed amoxicillin and ofloxacin drops for left ear infection.  Promethazine DM prescribed for cough.  Recommended Tylenol as needed for pain.    Risk  Prescription drug management.                                      Clinical Impression:  Final diagnoses:  [H65.02] Acute serous otitis media of left ear, recurrence not specified (Primary)  [H60.502] Acute otitis externa of left ear, unspecified type          ED Disposition Condition    Discharge Stable          ED Prescriptions       Medication Sig Dispense Start Date End Date Auth. Provider    amoxicillin (AMOXIL) 875 MG tablet Take 1 tablet (875 mg total) by mouth 2 (two) times daily. for 7 days 14 tablet 5/1/2025 5/8/2025 Radha Jenkins PA    ofloxacin (FLOXIN) 0.3 % otic solution Place 5 drops into the left ear 2 (two) times daily. for 7 days 5 mL 5/1/2025 5/8/2025 Radha Jenkins PA    promethazine-dextromethorphan  (PROMETHAZINE-DM) 6.25-15 mg/5 mL Syrp Take 5 mLs by mouth every 6 (six) hours as needed (cough). 118 mL 5/1/2025 -- Radha Jenkins PA          Follow-up Information       Follow up With Specialties Details Why Contact Info [facility] Emergency Medicine  As needed, If symptoms worsen [address] [facility] [address] [phone]    Arcelia Claros, FNP Family Medicine Schedule an appointment as soon as possible for a visit in 2 days  [address] [facility] [address]  [phone]                 [1]   Social History  Tobacco Use    Smoking status: Never     Passive exposure: Never    Smokeless tobacco: Never   Substance Use Topics    Alcohol use: Not Currently     Comment: 1-2 times a year    Drug use: Never        Radha Jenkins PA  05/01/25 0601

## 2025-05-05 ENCOUNTER — OFFICE VISIT (OUTPATIENT)
Dept: NEUROLOGY | Facility: CLINIC | Age: 55
End: 2025-05-05
Payer: OTHER GOVERNMENT

## 2025-05-05 ENCOUNTER — PATIENT MESSAGE (OUTPATIENT)
Dept: NEUROLOGY | Facility: CLINIC | Age: 55
End: 2025-05-05

## 2025-05-05 ENCOUNTER — PATIENT MESSAGE (OUTPATIENT)
Dept: INTERNAL MEDICINE | Facility: CLINIC | Age: 55
End: 2025-05-05
Payer: OTHER GOVERNMENT

## 2025-05-05 DIAGNOSIS — E66.812 CLASS 2 SEVERE OBESITY WITH SERIOUS COMORBIDITY AND BODY MASS INDEX (BMI) OF 38.0 TO 38.9 IN ADULT, UNSPECIFIED OBESITY TYPE: ICD-10-CM

## 2025-05-05 DIAGNOSIS — E66.01 CLASS 2 SEVERE OBESITY WITH SERIOUS COMORBIDITY AND BODY MASS INDEX (BMI) OF 38.0 TO 38.9 IN ADULT, UNSPECIFIED OBESITY TYPE: ICD-10-CM

## 2025-05-05 DIAGNOSIS — G44.059 SUNCT (SHORT UNILATERAL NEURALGIFORM HEADACHE, CONJUNCTIVAL INJ/TEAR): Primary | Chronic | ICD-10-CM

## 2025-05-05 DIAGNOSIS — R41.3 MEMORY LOSS: ICD-10-CM

## 2025-05-05 PROCEDURE — 98006 SYNCH AUDIO-VIDEO EST MOD 30: CPT | Mod: 95,,, | Performed by: NURSE PRACTITIONER

## 2025-05-05 NOTE — PROGRESS NOTES
[facility] Neurology Follow Up Telemedicine Visit Note    Initial Visit Date: 11/9/2022  Last Visit Date: 11/4/2024  Current Visit Date:  05/05/2025    This is a real-time audio/video visit that was performed with the originating site at patient's home and the distant site, [facility]. Verbal consent to participate in interactive audio & video visit was obtained.    I discussed with the patient regarding the nature of our telehealth visits, that:    - Our sessions are not being recorded and that personal health information is protected  - Provider would evaluate the patient and recommend diagnostics and treatments based on my assessment  - [facility] will provide follow up care in person if/when the patient needs it.     Chief Complaint:     Chief Complaint   Patient presents with    SUNCT (short unilateral neuralgiform headache, conjunctival     Pt states feeling better now     History of Present Illness:      This is 54 y.o. female with history of hypertension, hyperlipidemia, diabetes type 2, anxiety, hypothyroidism, obesity, transaminitis who was referred for SUNCT headache.   Also complaining of memory loss. During last visit, LTG  mg daily and lisinopril 20 mg PO daily were continued.     Today, Pt states SUNCT headaches are controlled with LTG XR and lisinopril  doing very well. Memory is stable. Continues to crocheting, playing piano  which she states is helpful for focus/memory/anxiety. Takes OTC mushroom pills for memory, but shot sure if it is effective. Sleeping well at night. Currently c/o sinus issues and cough. Admits to drinking three 17 ounces of water daily.    Age of Onset : 18 years old      Headache Description:   left retro-orbital, sharp, stabbing, seconds, lasting days, with OS lacrimation. Unclear if there is ptosis, conjunctival injection, miosis/mydriasis:  On occasion.  Right frontal burning sensation:  resolved      Frequency: as above.      Provocation Factors:  Pollen.     Risk Factors  - Family history of headache disorder: Yes son, mother, and sister with headache.   - History of focal CNS lesions: No  - History of CNS infections: No  - History head trauma: No  - History of underlying mood disorder: No  - History of sleep disorder: No   - Recreational drug use: No  - Tobacco use: No  - Alcohol use: Yes occasional  - Weight fluctuation: Not Applicable  - Isotretinoin or Tetracycline use:  Not Applicable  - Family planning and contraceptive use: Not Applicable    Medications:     Current Prophylactic  Lisinopril 20 mg once a day (11/28/2023 - present)  Lamotrigine  mg once a day (2/13/2023 to 3/1/2023) restarted (9/11/2023 - present) effective  Amlodipine 10mg PO daily (8/1/2023 - present)    Current Abortive  Tylenol - ineffective     Prior Prophylactic  Topiramate 100 mg once at night - ran out of refills  Gabapentin 100 mg twice a day (? - 5/31/2023) - ineffective    Prior Abortive  Ibuprofen PRN - ineffective   Indomethacin 25mg PO PRN (7/8/2024)  Devices:     - VNS:  - TNS  - TMS:     Procedures:     - Botox:  - PSG block:   - Occipital nerve block:     Labs:     Results for orders placed or performed in visit on 04/09/25   Comprehensive Metabolic Panel    Collection Time: 04/09/25  9:40 AM   Result Value Ref Range    Sodium 138 136 - 145 mmol/L    Potassium 4.4 3.5 - 5.1 mmol/L    Chloride 105 98 - 107 mmol/L    CO2 28 22 - 29 mmol/L    Glucose 186 (H) 74 - 100 mg/dL    Blood Urea Nitrogen 16.4 9.8 - 20.1 mg/dL    Creatinine 1.18 (H) 0.55 - 1.02 mg/dL    Calcium 9.6 8.4 - 10.2 mg/dL    Protein Total 7.2 6.4 - 8.3 gm/dL    Albumin 3.7 3.5 - 5.0 g/dL    Globulin 3.5 2.4 - 3.5 gm/dL    Albumin/Globulin Ratio 1.1 1.1 - 2.0 ratio    Bilirubin Total 0.4 <=1.5 mg/dL    ALP 70 40 - 150 unit/L    ALT 35 0 - 55 unit/L    AST 29 11 - 45 unit/L    eGFR 54 mL/min/1.73/m2    Anion Gap 5.0 mEq/L    BUN/Creatinine  Ratio 14    Hemoglobin A1C    Collection Time: 04/09/25  9:40 AM   Result Value Ref Range    Hemoglobin A1c 7.7 (H) <=7.0 %    Estimated Average Glucose 174.3 mg/dL   TSH    Collection Time: 04/09/25  9:40 AM   Result Value Ref Range    TSH 1.722 0.350 - 4.940 uIU/mL   T4, Free    Collection Time: 04/09/25  9:40 AM   Result Value Ref Range    Thyroxine Free 1.12 0.70 - 1.48 ng/dL   Microalbumin/Creatinine Ratio, Urine    Collection Time: 04/09/25  9:41 AM   Result Value Ref Range    Urine Microalbumin 9.6 <=30.0 ug/mL    Urine Creatinine 60.0 45.0 - 106.0 mg/dL    Microalbumin Creatinine Ratio 16.0 0.0 - 30.0 mg/gm Cr   Urinalysis    Collection Time: 04/09/25  9:41 AM   Result Value Ref Range    Color, UA Light-Yellow Yellow, Light-Yellow, Dark Yellow, Rufina, Straw    Appearance, UA Clear Clear    Specific Gravity, UA 1.010 1.005 - 1.030    pH, UA 6.0 5.0 - 8.5    Protein, UA Negative Negative    Glucose, UA Normal Negative, Normal    Ketones, UA Negative Negative    Blood, UA Negative Negative    Bilirubin, UA Negative Negative    Urobilinogen, UA Normal 0.2, 1.0, Normal    Nitrites, UA Negative Negative    Leukocyte Esterase, UA Negative Negative    RBC, UA None Seen None Seen, 0-2, 3-5, 0-5 /HPF    WBC, UA 0-5 None Seen, 0-2, 3-5, 0-5 /HPF    Bacteria, UA None Seen None Seen /HPF    Squamous Epithelial Cells, UA Trace (A) None Seen /HPF    Hyaline Casts, UA None Seen None Seen /lpf       Studies:     - MRI Brain with and without contrast August 12, 2019:  I have reviewed the study independently and with the patient.  Unremarkable.  - MRA Head w/o Myron:   - MRV Head w/o Myron:   - NCHCT:  - Lumbar Puncture:    Review of Systems:   As per HPI  All other systems reviewed and are negative.    Physical Exams:     Physical Exam  Nursing note reviewed.   Constitutional:       Appearance: Normal appearance.   HENT:      Head: Normocephalic.      Right Ear: External ear normal.      Left Ear: External ear normal.      Nose:  Nose normal.      Mouth/Throat:      Mouth: Mucous membranes are moist.   Pulmonary:      Effort: Pulmonary effort is normal.   Abdominal:      General: There is no distension.      Tenderness: There is no guarding.      Comments: round   Musculoskeletal:         General: Normal range of motion.      Cervical back: Normal range of motion.   Skin:     Findings: No lesion or rash.   Neurological:      Mental Status: She is alert.     Telemedicine Comprehensive Neurological Exam:  Mental Status: Alert Oriented to Self, Date, and Place. Comprehension wnl. No dysarthria.   CN II - XII: No ptosis OU, EOMI without nystagmus, Hearing grossly intact, Face Symmetric, Tongue and Uvula midline, Trapezius symmetric bilateral.   Motor: no abnormal involuntary or voluntary movements, Fine finger movements wnl b/l, No pronator drift.   Sensory: unable to assess.  Reflexes: unable to assess.   Cerebellar: RAHM wnl b/l.  Romberg: absent.   Gait: normal, bilat arm swing intact    Assessment:     This is 54 y.o. female with history of hypertension, hyperlipidemia, diabetes type 2, anxiety, hypothyroidism, obesity, transaminitis who was referred for SUNCT headache. States she is doing well on LTG XR, denies SUNCT HA.  Sleeping well, anxiety controlled, currently crocheting and playing piano which has been helpful for stress and memory. Is only drinking three 1 ounce bottles of water daily. No formal exercise program at this time.    Problem List Items Addressed This Visit          Neuro    SUNCT (short unilateral neuralgiform headache, conjunctival inj/tear) - Primary (Chronic)    Memory loss (Chronic)       Endocrine    Class 2 severe obesity with serious comorbidity and body mass index (BMI) of 38.0 to 38.9 in adult     Plan:     [] c/w Lisinopril 20 mg once a day via PCP  [] c/w Lamotrigine XR 100mg daily for SUNCT headache   [] start exercise program 30 min daily x 5 days weekly for overall health and wellness  [] drink 100 ounces  of water daily for optimal hydration    RTC 6 months - TM okay    Visit today is associated with current or anticipated ongoing medical care related to this patient's single serious condition/complex condition as documented above.     Headache education provided: good sleep hygiene and 7 hours of sleep per night, stress management, medication overuse education provided. Using more 3 OTC per week may worsen headaches, high intensity interval training has shown to reduce headache frequency. Low carb, high protein has shown to reduce headache frequency. Patient is instructed in keep headache diary.     I have explained the treatment plan, diagnosis, and prognosis to patient. All questions are answered to the best of my knowledge.     Face to face time 30 minutes, including documentation, chart review, counseling, education, review of test results, relevant medical records, and coordination of care.     Nannette Burt NP-C  General Neurology    05/05/2025

## 2025-05-07 DIAGNOSIS — G44.059 SUNCT (SHORT UNILATERAL NEURALGIFORM HEADACHE, CONJUNCTIVAL INJ/TEAR): Chronic | ICD-10-CM

## 2025-05-08 RX ORDER — LAMOTRIGINE 100 MG/1
100 TABLET, EXTENDED RELEASE ORAL DAILY
Qty: 90 TABLET | Refills: 0 | Status: SHIPPED | OUTPATIENT
Start: 2025-05-08

## 2025-05-28 DIAGNOSIS — E11.65 TYPE 2 DIABETES MELLITUS WITH HYPERGLYCEMIA, WITHOUT LONG-TERM CURRENT USE OF INSULIN: Chronic | ICD-10-CM

## 2025-05-28 RX ORDER — PIOGLITAZONE 45 MG/1
45 TABLET ORAL DAILY
Qty: 90 TABLET | Refills: 2 | OUTPATIENT
Start: 2025-05-28 | End: 2026-05-28

## 2025-06-10 DIAGNOSIS — I10 ESSENTIAL HYPERTENSION: Chronic | ICD-10-CM

## 2025-06-10 DIAGNOSIS — E03.9 HYPOTHYROIDISM (ACQUIRED): ICD-10-CM

## 2025-06-10 DIAGNOSIS — E78.2 MIXED HYPERLIPIDEMIA: Chronic | ICD-10-CM

## 2025-06-10 DIAGNOSIS — E11.65 TYPE 2 DIABETES MELLITUS WITH HYPERGLYCEMIA, WITHOUT LONG-TERM CURRENT USE OF INSULIN: Chronic | ICD-10-CM

## 2025-06-12 RX ORDER — ATORVASTATIN CALCIUM 40 MG/1
40 TABLET, FILM COATED ORAL DAILY
Qty: 90 TABLET | Refills: 3 | Status: SHIPPED | OUTPATIENT
Start: 2025-06-12 | End: 2026-06-12

## 2025-06-12 RX ORDER — LISINOPRIL 20 MG/1
20 TABLET ORAL DAILY
Qty: 90 TABLET | Refills: 1 | Status: SHIPPED | OUTPATIENT
Start: 2025-06-12

## 2025-06-12 RX ORDER — METFORMIN HYDROCHLORIDE 1000 MG/1
1000 TABLET ORAL 2 TIMES DAILY
Qty: 180 TABLET | Refills: 1 | Status: SHIPPED | OUTPATIENT
Start: 2025-06-12

## 2025-06-12 RX ORDER — AMLODIPINE BESYLATE 10 MG/1
10 TABLET ORAL DAILY
Qty: 90 TABLET | Refills: 1 | Status: SHIPPED | OUTPATIENT
Start: 2025-06-12

## 2025-06-12 RX ORDER — LEVOTHYROXINE SODIUM 100 UG/1
100 TABLET ORAL DAILY
Qty: 90 TABLET | Refills: 1 | Status: SHIPPED | OUTPATIENT
Start: 2025-06-12 | End: 2026-06-12

## 2025-07-29 ENCOUNTER — HOSPITAL ENCOUNTER (OUTPATIENT)
Dept: RADIOLOGY | Facility: HOSPITAL | Age: 55
Discharge: HOME | End: 2025-07-29
Payer: OTHER GOVERNMENT

## 2025-07-29 DIAGNOSIS — R74.8 ELEVATED LIVER ENZYMES: ICD-10-CM

## 2025-07-29 DIAGNOSIS — K76.9 LIVER DISEASE, UNSPECIFIED: ICD-10-CM

## 2025-07-29 DIAGNOSIS — R10.11 RUQ PAIN: ICD-10-CM

## 2025-07-29 LAB
CREAT SERPL-MCNC: 1.25 MG/DL (ref 0.55–1.02)
GFR SERPLBLD CREATININE-BSD FMLA CKD-EPI: 51 ML/MIN/1.73/M2

## 2025-07-29 PROCEDURE — 74178 CT ABD&PLV WO CNTR FLWD CNTR: CPT | Mod: TC

## 2025-07-29 PROCEDURE — 25500020 PHARM REV CODE 255

## 2025-07-29 PROCEDURE — 82565 ASSAY OF CREATININE: CPT

## 2025-07-29 RX ADMIN — IOHEXOL 100 ML: 350 INJECTION, SOLUTION INTRAVENOUS at 11:07

## 2025-08-04 ENCOUNTER — OFFICE VISIT (OUTPATIENT)
Dept: INTERNAL MEDICINE | Facility: CLINIC | Age: 55
End: 2025-08-04
Payer: OTHER GOVERNMENT

## 2025-08-04 ENCOUNTER — LAB VISIT (OUTPATIENT)
Dept: LAB | Facility: HOSPITAL | Age: 55
End: 2025-08-04
Payer: OTHER GOVERNMENT

## 2025-08-04 VITALS
HEIGHT: 63 IN | TEMPERATURE: 98 F | WEIGHT: 211.88 LBS | SYSTOLIC BLOOD PRESSURE: 113 MMHG | RESPIRATION RATE: 18 BRPM | OXYGEN SATURATION: 99 % | HEART RATE: 56 BPM | DIASTOLIC BLOOD PRESSURE: 75 MMHG | BODY MASS INDEX: 37.54 KG/M2

## 2025-08-04 DIAGNOSIS — N18.31 STAGE 3A CHRONIC KIDNEY DISEASE: ICD-10-CM

## 2025-08-04 DIAGNOSIS — R10.11 RUQ PAIN: ICD-10-CM

## 2025-08-04 DIAGNOSIS — E66.812 CLASS 2 SEVERE OBESITY DUE TO EXCESS CALORIES WITH SERIOUS COMORBIDITY AND BODY MASS INDEX (BMI) OF 37.0 TO 37.9 IN ADULT: ICD-10-CM

## 2025-08-04 DIAGNOSIS — E11.65 TYPE 2 DIABETES MELLITUS WITH HYPERGLYCEMIA, WITHOUT LONG-TERM CURRENT USE OF INSULIN: Primary | ICD-10-CM

## 2025-08-04 DIAGNOSIS — R93.2 ABNORMAL FINDINGS ON DIAGNOSTIC IMAGING OF LIVER: ICD-10-CM

## 2025-08-04 DIAGNOSIS — I10 ESSENTIAL HYPERTENSION: Chronic | ICD-10-CM

## 2025-08-04 DIAGNOSIS — E66.01 CLASS 2 SEVERE OBESITY DUE TO EXCESS CALORIES WITH SERIOUS COMORBIDITY AND BODY MASS INDEX (BMI) OF 37.0 TO 37.9 IN ADULT: ICD-10-CM

## 2025-08-04 LAB
ALBUMIN SERPL-MCNC: 3.8 G/DL (ref 3.5–5)
ALBUMIN/GLOB SERPL: 1 RATIO (ref 1.1–2)
ALP SERPL-CCNC: 67 UNIT/L (ref 40–150)
ALT SERPL-CCNC: 33 UNIT/L (ref 0–55)
ANION GAP SERPL CALC-SCNC: 6 MEQ/L
AST SERPL-CCNC: 24 UNIT/L (ref 11–45)
BILIRUB SERPL-MCNC: 0.4 MG/DL
BUN SERPL-MCNC: 16.9 MG/DL (ref 9.8–20.1)
CALCIUM SERPL-MCNC: 10.1 MG/DL (ref 8.4–10.2)
CHLORIDE SERPL-SCNC: 102 MMOL/L (ref 98–107)
CO2 SERPL-SCNC: 26 MMOL/L (ref 22–29)
CREAT SERPL-MCNC: 1.17 MG/DL (ref 0.55–1.02)
CREAT/UREA NIT SERPL: 14
GFR SERPLBLD CREATININE-BSD FMLA CKD-EPI: 55 ML/MIN/1.73/M2
GLOBULIN SER-MCNC: 3.9 GM/DL (ref 2.4–3.5)
GLUCOSE SERPL-MCNC: 128 MG/DL (ref 74–100)
HBA1C MFR BLD: 7.2 %
POTASSIUM SERPL-SCNC: 4.7 MMOL/L (ref 3.5–5.1)
PROT SERPL-MCNC: 7.7 GM/DL (ref 6.4–8.3)
SODIUM SERPL-SCNC: 134 MMOL/L (ref 136–145)

## 2025-08-04 PROCEDURE — 83036 HEMOGLOBIN GLYCOSYLATED A1C: CPT | Mod: PBBFAC

## 2025-08-04 PROCEDURE — 36415 COLL VENOUS BLD VENIPUNCTURE: CPT

## 2025-08-04 PROCEDURE — 99215 OFFICE O/P EST HI 40 MIN: CPT | Mod: PBBFAC

## 2025-08-04 PROCEDURE — 80053 COMPREHEN METABOLIC PANEL: CPT

## 2025-08-04 PROCEDURE — 99214 OFFICE O/P EST MOD 30 MIN: CPT | Mod: S$PBB,,,

## 2025-08-04 NOTE — ASSESSMENT & PLAN NOTE
Body mass index is 37.54 kg/m².  Goal BMI <30.  Aerobic exercise 150 minutes per week.  Avoid soda, simple sugars, sweets, excessive rice, pasta, potatoes or bread.   Choose brown options when available and portion control.  Limit fast foods and fried foods.   Choose complex carbs in moderation (ex: green, leafy vegetables, beans, oatmeal).  Eat plenty of fresh fruits and vegetables with lean meats daily.   Consider permanent healthy lifestyle changes.

## 2025-08-04 NOTE — ASSESSMENT & PLAN NOTE
BP Readings from Last 3 Encounters:   08/04/25 113/75   05/01/25 139/72   04/09/25 103/67      At goal.  Follow a low sodium (less than 2 grams of sodium per day), DASH diet.   Continue lisinopril and amlodipine as prescribed.  Monitor blood pressure and report any consistent values greater than 140/90 and keep a log.  Maintain healthy weight with a BMI goal of <30.   Aerobic exercise for 150 minutes per week (or 5 days a week for 30 minutes each day).

## 2025-08-04 NOTE — ASSESSMENT & PLAN NOTE
Latest Reference Range & Units 08/04/25 09:39   Hemoglobin A1C, POC % 7.2   Not at goal.  Continue metformin and glimepriride as prescribed.  Follow ADA diet.   Check blood glucose twice daily and as needed. Bring log to every office visit.  Avoid soda, simple sweets, and limit rice/pasta/bread/starches and consume brown options when possible.    Maintain healthy weight with BMI goal <30.    Perform aerobic exercise for 150 minutes per week (or 5 days a week for 30 minutes each day).    Examine feet daily.    Obtain annual dilated eye exam.   Kidney Protection: Lisinopril  Statin Therapy: Atorvastatin  Eye exam: 10/31/2024  Foot exam: 8/4/2025

## 2025-08-04 NOTE — ASSESSMENT & PLAN NOTE
Lab Results   Component Value Date    BUN 16.9 08/04/2025    CREATININE 1.17 (H) 08/04/2025    EGFRNORACEVR 55 08/04/2025     Patient acknowledges CKD diagnosis and is following dietary restrictions.  Discussed dietary restrictions including limitations on potassium-rich foods like potatoes, bananas, orange juice, brown rice, and bread.  Ordered CMP to check renal function.  Clarified that metformin is not a direct cause of kidney damage but may need to be discontinued if significant decline in renal function occurs.  Renoprotective measures discussed:   -Follow renal diet (reduce intake of nuts, peanut butter, milk, cheese, dried beans, peas) and Low Sodium Diet (less than 2 grams per day).   -Control high blood pressure ( goal BP < 130/80, please record BP at home every day and bring log to next office visit)   -Exercise at least 30 minutes a day, 5 days a week.   -Maintain healthy weight.   -Decrease or stop alcohol use.   -Do not smoke.   -Stay well hydrated.   -Receive Pneumovax, Flu, and HBV vaccines if indicated.   -Do not take NSAIDs (Ibuprofen, Naproxen, Aleve, Advil, Toradol, Mobic), may take only Tylenol as needed for pain/headaches.   -Take cholesterol-lowering medications as prescribed (LDL goal <100).

## 2025-08-04 NOTE — PROGRESS NOTES
PATIENT NAME: Scotty Moctezuma  : 1970  DATE: 25  MRN: [id number]          Reason for Visit/Chief Complaint   Abdomen CT Results and Diabetes       History of Present Illness (HPI)     Scotty Moctezuma is a 54 y.o. Black female presenting in clinic today for Abdomen CT Results and Diabetes. Chronic conditions: HTN, HLD, DM2, CKD, Hypothyroidism, SUNCT Headaches, Obesity.     She is followed by:  [facility] GYN clinic - last office visit on 1/10/2024.  [facility] neurology clinic for SUNCT, stroke like symptoms, memory loss. Last office visit n 2024.Plan: continue Lisinopril 20 mg daily, Lamotrigine  mg daily.      Patient presents today for follow up of diabetes management. She reports current diabetes medications include glimepiride 8 mg in the morning, metformin 1,000 mg twice daily, and Actos 45 mg daily. She describes a previous adverse reaction to Victoza, including severe liver pain, vomiting, and GI distress that prevented eating and bowel movements. She discontinued Victoza due to these symptoms after consultation with a nurse friend. She expresses concern about metformin's potential impact on kidney function, though currently continues the medication. She reports maintaining diet and attempting home exercise, noting recent A1C improvement.     (2025) She reports persistent RUQ abdominal pain that initially began with Victoza use and has continued despite medication discontinuation. The pain is associated with nausea and inability to tolerate oral intake. She had multiple ER visits for similar pain while taking Victoza. CT WO Contrast from 2024 showed no abnormalities. She has regular bowel movements, noting that certain foods like donuts or sausage can trigger immediate bowel movements after consumption. She typically eats oatmeal for breakfast, though today had a biscuit with sausage gravy in the cafeteria. She has been taking metformin for 20 years and reports experiencing gas as  a side effect. Abdominal US was ordered. On 4/15/2025, abdomen (liver) US reveals: Multiple scattered echogenic lesions in the liver would recommend a 3 phase CT scan. Limited study due to overlying bowel gas. On 2025, Triphasic Abd CT reveals: No suspicious liver lesion is seen. There is no CT correlate for the small hyperechoic lesions seen on ultrasound. Recommend a follow-up ultrasound in 3 months to monitor those lesions.     She was diagnosed with CKD in . She describes significant dietary modifications including avoiding brown rice, brown bread, potatoes, and fruit juices. She notes strict dietary restrictions, emphasizing limited intake of potassium-rich foods. She mentions only consuming small amounts of certain foods like small oranges and watermelon, which has the least amount of potassium among melons. Her household has adapted to kidney-friendly dietary changes, partly influenced by her 's similar diagnosis in . She characterizes the kidney disease diet as challenging, with extensive food restrictions. She expresses interest in having kidney function labs checked.     Denies smoking, drinking, or illicit drug use. Denies chest pain, shortness of breath, cough, headache, dizziness, weakness, abdominal pain, nausea, vomiting, diarrhea, constipation, dysuria, depression, anxiety, SI, and HI.    Medical / Social / Family History     Past Medical History:   Diagnosis Date    Anxiety     Diabetes mellitus, type 2     Essential hypertension 2022    Headache     High cholesterol     Non-seasonal allergic rhinitis     Stage 3a chronic kidney disease     Thyroid disease          Past Surgical History:   Procedure Laterality Date     SECTION      CHOLECYSTECTOMY      TUBAL LIGATION           Social History  Scotty Moctezuma's  reports that she has never smoked. She has never been exposed to tobacco smoke. She has never used smokeless tobacco. She reports that she does not  "currently use alcohol. She reports that she does not use drugs.    Family History  Scotty Moctezuma's family history includes [place]'s disease in her father; Glaucoma in her maternal aunt and maternal grandmother; Heart failure in her mother.    Medications and Allergies     Medications  Current Outpatient Medications   Medication Instructions    albuterol (PROVENTIL/VENTOLIN HFA) 90 mcg/actuation inhaler 2 puffs, Inhalation, Every 6 hours PRN    amLODIPine (NORVASC) 10 mg, Oral, Daily    atorvastatin (LIPITOR) 40 mg, Oral, Daily    EUTHYROX 100 mcg, Oral, Daily    glimepiride (AMARYL) 8 mg, Oral, With breakfast, For Diabetes    lamotrigine XR (LAMICTAL XR) 100 mg, Oral, Daily    lisinopriL (PRINIVIL,ZESTRIL) 20 mg, Oral, Daily    metFORMIN (GLUCOPHAGE) 1,000 mg, Oral, 2 times daily    pioglitazone (ACTOS) 45 mg, Oral, Daily       Allergies  Review of patient's allergies indicates:  No Known Allergies    Subjective:     Review of Systems   Review of Systems   Constitutional: Negative.    HENT: Negative.     Eyes: Negative.    Respiratory: Negative.     Cardiovascular: Negative.    Gastrointestinal: Negative.    Endocrine: Negative.    Genitourinary: Negative.    Musculoskeletal: Negative.    Skin: Negative.    Allergic/Immunologic: Negative.    Neurological: Negative.    Hematological: Negative.    Psychiatric/Behavioral: Negative.     All other systems reviewed and are negative.       Objective:     Physical Examination   Visit Vitals  /75 (BP Location: Left arm, Patient Position: Sitting)   Pulse (!) 56   Temp 97.7 °F (36.5 °C) (Oral)   Resp 18   Ht 5' 3" (1.6 m)   Wt 96.1 kg (211 lb 14.4 oz)   LMP 10/30/2020 (Exact Date)   SpO2 99%   BMI 37.54 kg/m²     Physical Exam  Vitals reviewed.   Constitutional:       Appearance: Normal appearance. She is normal weight.   HENT:      Head: Normocephalic and atraumatic.      Right Ear: External ear normal.      Left Ear: External ear normal.      Nose: Nose normal. "      Mouth/Throat:      Mouth: Mucous membranes are moist.      Pharynx: Oropharynx is clear.   Eyes:      Extraocular Movements: Extraocular movements intact.      Conjunctiva/sclera: Conjunctivae normal.      Pupils: Pupils are equal, round, and reactive to light.   Cardiovascular:      Rate and Rhythm: Regular rhythm. Bradycardia present.      Pulses: Normal pulses.      Heart sounds: Normal heart sounds.   Pulmonary:      Effort: Pulmonary effort is normal.      Breath sounds: Normal breath sounds.   Abdominal:      General: Bowel sounds are normal.      Palpations: Abdomen is soft.   Musculoskeletal:         General: Normal range of motion.      Cervical back: Normal range of motion and neck supple.   Feet:      Comments:   Protective Sensation (w/ 10 gram monofilament):  Right: Intact  Left: Intact    Visual Inspection:  Normal -  Bilateral    Pedal Pulses:   Right: Present  Left: Present    Posterior Tibialis Pulses:   Right:Present  Left: Present       Skin:     General: Skin is warm and dry.      Capillary Refill: Capillary refill takes less than 2 seconds.   Neurological:      General: No focal deficit present.      Mental Status: She is alert and oriented to person, place, and time.   Psychiatric:         Mood and Affect: Mood normal.         Behavior: Behavior normal.         Thought Content: Thought content normal.         Judgment: Judgment normal.           Results     Lab Results   Component Value Date    WBC 3.79 (L) 12/23/2024    RBC 4.17 (L) 12/23/2024    HGB 12.8 12/23/2024    HCT 39.1 12/23/2024    MCV 93.8 12/23/2024    MCH 30.7 12/23/2024    MCHC 32.7 (L) 12/23/2024    RDW 13.2 12/23/2024     12/23/2024    MPV 10.2 12/23/2024      Lab Results   Component Value Date     (L) 08/04/2025    K 4.7 08/04/2025    CO2 26 08/04/2025    BUN 16.9 08/04/2025    CREATININE 1.17 (H) 08/04/2025    ALBUMIN 3.8 08/04/2025    BILITOT 0.4 08/04/2025    ALKPHOS 67 08/04/2025    AST 24 08/04/2025     "ALT 33 08/04/2025    AGAP 6.0 08/04/2025    EGFRNORACEVR 55 08/04/2025     Lab Results   Component Value Date    TSH 1.722 04/09/2025     Lab Results   Component Value Date    CHOL 140 12/23/2024    HDL 57 12/23/2024    TRIG 44 12/23/2024     Lab Results   Component Value Date    SGUA 1.010 04/09/2025    PROTEINUA Negative 04/09/2025    BILIRUBINUA Negative 04/09/2025    WBCUA 0-5 04/09/2025    RBCUA None Seen 04/09/2025    BACTERIA None Seen 04/09/2025    LEUKOCYTESUR Negative 04/09/2025    UROBILINOGEN Normal 04/09/2025     Lab Results   Component Value Date    CREATRANDUR 60.0 04/09/2025    MICALBCREAT 16.0 04/09/2025     Lab Results   Component Value Date    AHEYYXIM83HJ 47.7 11/11/2022    FOLATE 15.4 05/01/2023     Lab Results   Component Value Date    HIV Nonreactive 05/01/2023    HEPCAB Nonreactive 05/01/2023     No results found for: "FITDIAG", "COLOGUARD"  No results found for: "OCCBLDIA"    Assessment and Plan (including Health Maintenance):     Assessment and Plan     Problem List Items Addressed This Visit       Essential hypertension (Chronic)    Current Assessment & Plan   BP Readings from Last 3 Encounters:   08/04/25 113/75   05/01/25 139/72   04/09/25 103/67      At goal.  Follow a low sodium (less than 2 grams of sodium per day), DASH diet.   Continue lisinopril and amlodipine as prescribed.  Monitor blood pressure and report any consistent values greater than 140/90 and keep a log.  Maintain healthy weight with a BMI goal of <30.   Aerobic exercise for 150 minutes per week (or 5 days a week for 30 minutes each day).          Type 2 diabetes mellitus with hyperglycemia, without long-term current use of insulin - Primary (Chronic)    Current Assessment & Plan    Latest Reference Range & Units 08/04/25 09:39   Hemoglobin A1C, POC % 7.2   Not at goal.  Continue metformin and glimepriride as prescribed.  Follow ADA diet.   Check blood glucose twice daily and as needed. Bring log to every office " visit.  Avoid soda, simple sweets, and limit rice/pasta/bread/starches and consume brown options when possible.    Maintain healthy weight with BMI goal <30.    Perform aerobic exercise for 150 minutes per week (or 5 days a week for 30 minutes each day).    Examine feet daily.    Obtain annual dilated eye exam.   Kidney Protection: Lisinopril  Statin Therapy: Atorvastatin  Eye exam: 10/31/2024  Foot exam: 8/4/2025         Relevant Orders    POCT HEMOGLOBIN A1C (Completed)    Class 2 severe obesity with serious comorbidity and body mass index (BMI) of 37.0 to 37.9 in adult    Current Assessment & Plan   Body mass index is 37.54 kg/m².  Goal BMI <30.  Aerobic exercise 150 minutes per week.  Avoid soda, simple sugars, sweets, excessive rice, pasta, potatoes or bread.   Choose brown options when available and portion control.  Limit fast foods and fried foods.   Choose complex carbs in moderation (ex: green, leafy vegetables, beans, oatmeal).  Eat plenty of fresh fruits and vegetables with lean meats daily.   Consider permanent healthy lifestyle changes.          Stage 3a chronic kidney disease    Current Assessment & Plan   Lab Results   Component Value Date    BUN 16.9 08/04/2025    CREATININE 1.17 (H) 08/04/2025    EGFRNORACEVR 55 08/04/2025     Patient acknowledges CKD diagnosis and is following dietary restrictions.  Discussed dietary restrictions including limitations on potassium-rich foods like potatoes, bananas, orange juice, brown rice, and bread.  Ordered CMP to check renal function.  Clarified that metformin is not a direct cause of kidney damage but may need to be discontinued if significant decline in renal function occurs.  Renoprotective measures discussed:   -Follow renal diet (reduce intake of nuts, peanut butter, milk, cheese, dried beans, peas) and Low Sodium Diet (less than 2 grams per day).   -Control high blood pressure ( goal BP < 130/80, please record BP at home every day and bring log to next  office visit)   -Exercise at least 30 minutes a day, 5 days a week.   -Maintain healthy weight.   -Decrease or stop alcohol use.   -Do not smoke.   -Stay well hydrated.   -Receive Pneumovax, Flu, and HBV vaccines if indicated.   -Do not take NSAIDs (Ibuprofen, Naproxen, Aleve, Advil, Toradol, Mobic), may take only Tylenol as needed for pain/headaches.   -Take cholesterol-lowering medications as prescribed (LDL goal <100).           Relevant Orders    Comprehensive Metabolic Panel    Abnormal findings on diagnostic imaging of liver    Overview   7/29/2025 -   EXAMINATION:  CT ABDOMEN PELVIS W/O CONTRAST PLUS TRIPHASIC W/CONTRAST     CLINICAL HISTORY:  Right upper quadrant painLiver lesion, > 1cm;.     TECHNIQUE:  Helical acquisition through the abdomen and pelvis without and with IV contrast.  Three plane reconstructions were provided for review. DLP 1656 mGycm. Automatic exposure control, adjustment of mA/kV or iterative reconstruction technique was used to reduce radiation.     COMPARISON:  Ultrasound 15 April 2025, CT 10 August 2024     FINDINGS:  The limited imaged lung bases are clear.     The liver is not significantly enlarged.  No suspicious liver lesion is seen.  There is no correlate for the small hyperechoic lesions seen on ultrasound.  Portal vein is patent.     The gallbladder is surgically absent.  No significant abnormality of the spleen, pancreas or adrenals.  No hydronephrosis or suspicious renal lesion.     There are some mildly dilated small bowel loops but no obstruction evident.  No significant inflammatory changes of the bowel.  Moderate stool in the colon.     Urinary bladder is unremarkable. No free fluid. Aorta normal in caliber.     There are no acute osseous findings.     Impression:     No suspicious liver lesion is seen.  There is no CT correlate for the small hyperechoic lesions seen on ultrasound.  Recommend a follow-up ultrasound in 3 months to monitor those lesions.         Current  Assessment & Plan   Recent CT shows improvement with no liver lesion visible anymore.  Ordered liver ultrasound in 3 months for continued monitoring.         Relevant Orders    US Abdomen Limited_Liver    Comprehensive Metabolic Panel     Health Maintenance/Immunizations     Health Maintenance Due   Topic Date Due    Diabetic Eye Exam  10/31/2025     All of your core healthy metrics are met.      Health Maintenance Topics with due status: Not Due       Topic Last Completion Date    TETANUS VACCINE 08/17/2020    Colorectal Cancer Screening 03/18/2022    Cervical Cancer Screening 01/10/2024    Influenza Vaccine 09/27/2024    Lipid Panel 12/23/2024    Mammogram 03/05/2025    Diabetes Urine Screening 04/09/2025    Foot Exam 08/04/2025    Hemoglobin A1c 08/04/2025    RSV Vaccine (Age 60+ and Pregnant patients) Not Due     Immunization History   Administered Date(s) Administered    COVID-19 MRNA, LN-S PF (MODERNA HALF 0.25 ML DOSE) 01/02/2022    COVID-19 Vaccine 01/02/2022    COVID-19, MRNA, LN-S, PF (Pfizer) (Purple Cap) 04/07/2021, 04/28/2021    DTP 04/15/1969, 07/18/1972    IPV 04/15/1969, 07/18/1972, 10/30/1984    Influenza - Quadrivalent 02/18/2021, 02/18/2022    Influenza - Quadrivalent - PF *Preferred* (6 months and older) 02/18/2021, 02/18/2022, 11/11/2022    Influenza - Trivalent - Afluria, Fluzone MDV 01/31/2018    Influenza - Trivalent - Fluarix, Flulaval, Fluzone, Afluria - PF 11/12/2011    MMR 08/15/1972    Pneumococcal Conjugate - 20 Valent 12/23/2024    Td - Not Adsorbed (Adult) 10/30/1984    Tdap 08/17/2020    Zoster 02/18/2022    Zoster Recombinant 02/18/2021, 02/18/2022          Future Appointments   Date Time Provider Department Center   10/31/2025  9:40 AM [facility] [place]   11/3/2025 11:00 AM Nannette Burt, ANP [facility]O [facility]   11/5/2025  8:20 AM [facility]   1/20/2026  8:50 AM Barbara Wilson FNP [facility] [place]          Follow up in about 3 months (around 11/4/2025) for F2F, Lab review, Med check, Wellness, DM, RTC PRN.          Signature:        BOOGIE Samayoa  [facility]  [address] [facility] [id number]    Date of encounter: 8/4/25    This note was generated with the assistance of ambient listening technology. Verbal consent was obtained by the patient and accompanying visitor(s) for the recording of patient appointment to facilitate this note. I attest to having reviewed and edited the generated note for accuracy, though some syntax or spelling errors may persist. Please contact the author of this note for any clarification.

## 2025-08-04 NOTE — ASSESSMENT & PLAN NOTE
Recent CT shows improvement with no liver lesion visible anymore.  Ordered liver ultrasound in 3 months for continued monitoring.

## 2025-08-21 DIAGNOSIS — E11.65 TYPE 2 DIABETES MELLITUS WITH HYPERGLYCEMIA, WITHOUT LONG-TERM CURRENT USE OF INSULIN: Chronic | ICD-10-CM

## 2025-08-21 DIAGNOSIS — E03.9 HYPOTHYROIDISM (ACQUIRED): ICD-10-CM

## 2025-08-21 DIAGNOSIS — E78.2 MIXED HYPERLIPIDEMIA: Chronic | ICD-10-CM

## 2025-08-21 DIAGNOSIS — G44.059 SUNCT (SHORT UNILATERAL NEURALGIFORM HEADACHE, CONJUNCTIVAL INJ/TEAR): Chronic | ICD-10-CM

## 2025-08-21 DIAGNOSIS — I10 ESSENTIAL HYPERTENSION: Chronic | ICD-10-CM

## 2025-08-21 DIAGNOSIS — J30.89 NON-SEASONAL ALLERGIC RHINITIS, UNSPECIFIED TRIGGER: ICD-10-CM

## 2025-08-26 RX ORDER — LEVOTHYROXINE SODIUM 100 UG/1
100 TABLET ORAL DAILY
Qty: 90 TABLET | Refills: 1 | Status: SHIPPED | OUTPATIENT
Start: 2025-08-26 | End: 2026-08-26

## 2025-08-26 RX ORDER — GLIMEPIRIDE 4 MG/1
8 TABLET ORAL
Qty: 180 TABLET | Refills: 3 | Status: SHIPPED | OUTPATIENT
Start: 2025-08-26 | End: 2026-08-26

## 2025-08-26 RX ORDER — METFORMIN HYDROCHLORIDE 1000 MG/1
1000 TABLET ORAL 2 TIMES DAILY
Qty: 180 TABLET | Refills: 1 | Status: SHIPPED | OUTPATIENT
Start: 2025-08-26

## 2025-08-26 RX ORDER — LISINOPRIL 20 MG/1
20 TABLET ORAL DAILY
Qty: 90 TABLET | Refills: 1 | Status: SHIPPED | OUTPATIENT
Start: 2025-08-26

## 2025-08-26 RX ORDER — PIOGLITAZONE 45 MG/1
45 TABLET ORAL DAILY
Qty: 90 TABLET | Refills: 2 | Status: SHIPPED | OUTPATIENT
Start: 2025-08-26 | End: 2026-08-26

## 2025-08-26 RX ORDER — ALBUTEROL SULFATE 90 UG/1
2 INHALANT RESPIRATORY (INHALATION) EVERY 6 HOURS PRN
Qty: 18 G | Refills: 2 | Status: SHIPPED | OUTPATIENT
Start: 2025-08-26 | End: 2026-08-26

## 2025-08-26 RX ORDER — ATORVASTATIN CALCIUM 40 MG/1
40 TABLET, FILM COATED ORAL DAILY
Qty: 90 TABLET | Refills: 3 | Status: SHIPPED | OUTPATIENT
Start: 2025-08-26 | End: 2026-08-26

## 2025-08-26 RX ORDER — AMLODIPINE BESYLATE 10 MG/1
10 TABLET ORAL DAILY
Qty: 90 TABLET | Refills: 1 | Status: SHIPPED | OUTPATIENT
Start: 2025-08-26

## 2025-09-05 DIAGNOSIS — E11.65 TYPE 2 DIABETES MELLITUS WITH HYPERGLYCEMIA, WITHOUT LONG-TERM CURRENT USE OF INSULIN: Chronic | ICD-10-CM

## 2025-09-05 DIAGNOSIS — I10 ESSENTIAL HYPERTENSION: Chronic | ICD-10-CM

## 2025-09-05 RX ORDER — AMLODIPINE BESYLATE 10 MG/1
10 TABLET ORAL DAILY
Qty: 90 TABLET | Refills: 1 | OUTPATIENT
Start: 2025-09-05

## 2025-09-05 RX ORDER — LISINOPRIL 20 MG/1
20 TABLET ORAL DAILY
Qty: 90 TABLET | Refills: 1 | OUTPATIENT
Start: 2025-09-05

## 2025-09-05 RX ORDER — PIOGLITAZONE 45 MG/1
45 TABLET ORAL DAILY
Qty: 90 TABLET | Refills: 2 | OUTPATIENT
Start: 2025-09-05 | End: 2026-09-05